# Patient Record
Sex: FEMALE | Race: WHITE | HISPANIC OR LATINO | Employment: FULL TIME | URBAN - METROPOLITAN AREA
[De-identification: names, ages, dates, MRNs, and addresses within clinical notes are randomized per-mention and may not be internally consistent; named-entity substitution may affect disease eponyms.]

---

## 2018-04-09 ENCOUNTER — APPOINTMENT (OUTPATIENT)
Dept: MEDICAL GROUP | Facility: MEDICAL CENTER | Age: 33
End: 2018-04-09
Payer: COMMERCIAL

## 2018-04-30 RX ORDER — CICLOPIROX 80 MG/ML
1 SOLUTION TOPICAL
COMMUNITY
Start: 2017-10-20 | End: 2020-09-10

## 2018-05-01 ENCOUNTER — OFFICE VISIT (OUTPATIENT)
Dept: MEDICAL GROUP | Facility: MEDICAL CENTER | Age: 33
End: 2018-05-01
Payer: COMMERCIAL

## 2018-05-01 VITALS
SYSTOLIC BLOOD PRESSURE: 112 MMHG | DIASTOLIC BLOOD PRESSURE: 64 MMHG | HEART RATE: 94 BPM | WEIGHT: 179.01 LBS | RESPIRATION RATE: 16 BRPM | BODY MASS INDEX: 32.94 KG/M2 | TEMPERATURE: 99.6 F | OXYGEN SATURATION: 96 % | HEIGHT: 62 IN

## 2018-05-01 DIAGNOSIS — E11.9 CONTROLLED TYPE 2 DIABETES MELLITUS WITHOUT COMPLICATION, WITHOUT LONG-TERM CURRENT USE OF INSULIN (HCC): ICD-10-CM

## 2018-05-01 DIAGNOSIS — E78.5 DYSLIPIDEMIA: ICD-10-CM

## 2018-05-01 DIAGNOSIS — Z00.00 HEALTH CARE MAINTENANCE: ICD-10-CM

## 2018-05-01 DIAGNOSIS — E28.2 PCOS (POLYCYSTIC OVARIAN SYNDROME): ICD-10-CM

## 2018-05-01 DIAGNOSIS — Z76.89 ENCOUNTER TO ESTABLISH CARE: ICD-10-CM

## 2018-05-01 DIAGNOSIS — E66.9 OBESITY (BMI 30.0-34.9): ICD-10-CM

## 2018-05-01 PROCEDURE — 99214 OFFICE O/P EST MOD 30 MIN: CPT | Performed by: INTERNAL MEDICINE

## 2018-05-01 ASSESSMENT — PATIENT HEALTH QUESTIONNAIRE - PHQ9: CLINICAL INTERPRETATION OF PHQ2 SCORE: 0

## 2018-05-01 NOTE — PROGRESS NOTES
CHIEF COMPLIANT:   Chief Complaint   Patient presents with   • Establish Care     diabetic       Stefania Renteria is a 32 y.o. female here for DM follow up    DIABETES MELLITUS TYPE 2  Onset/D29 y/o, in   Diabetes education:  Y    Medications:   · Metformin:  1000 mg BID  · ACE/ARB: N  · Statin: N  · ASA: N  Compliant with medications: yes  Checking feet daily/wear soft socks/shoes: advised    Diabetes ABCDE TARGETS  · A1c, last:  pending  · Fingersticks:  BID  · Mean BS:  160 most frequently  · Hypoglycemia:  No  · No symptoms of hypoglycemia: tremors, hunger, sometimes dizzy  · Blood Pressure, goal < 140/90: yes  · Cholesterol-Lipid Panel: pending  · Dysalbuminuria:  pending    Diet:  regular  Exercise:  Yes, 4 days a week  BMI: 32    DM complications:  · Peripheral neuropathy: No numbness or tingling sensation in the feet.  · Retinopathy:      Last eye exam: 2018. No evidence of retinopathy.    · Nephropathy:     Neg  · CVS:     No CAD symptoms (CP/pressure, exertional dyspnea, edema).    · GI:     No gastropathy sx (nausea/vomiting).    FH of DM:     Blood pressure  No meds  Diet / exercise / BMI: as above.   FH of HTN:     LIPID PROFILE / HYPERLIPIDEMIA  No meds.   Diet / exercise / BMI: as above.   FH:     OBESITY, Body mass index is 32.74 kg/m².  Onset: childhood  Diet:  regular  Exercise:  Yes, 4 days a week  No temperature intolerance. No change in hair/skin quality, BMs.   No HTN, buffalo hump, purple striae, flushing.  FH of obesity: mother, siblings    PCOS  D yrs ago  She has:  -  irregular periods  - fertility problems  - blood glucose control, DM  - obesity    - no facial hair    She was f/u by gyn    Reviewed PMH, PSH, FH, SH, ALL, IMM, MEDS.     Current medicines (including changes today)  Current Outpatient Prescriptions   Medication Sig Dispense Refill   • glucose blood (ONE TOUCH ULTRA TEST) strip by Does not apply route.     • VITAMIN D, CHOLECALCIFEROL, PO Take  by mouth.     •  ciclopirox (PENLAC) 8 % solution Apply 1 Application to affected area(s).     • metformin (GLUCOPHAGE) 1000 MG tablet Take  by mouth.     • metformin (GLUCOPHAGE) 1000 MG tablet Take 1,000 mg by mouth.     • glucose blood (ONE TOUCH ULTRA TEST) strip 1 Strip by Other route.     • ONETOUCH DELICA LANCETS FINE Misc by Does not apply route.     • ONETOUCH DELICA LANCETS FINE Misc 1 Each by Other route.     • Prenatal MV-Min-Fe Fum-FA-DHA (PRENATAL 1 PO) Take  by mouth.     • glucose blood (ONE TOUCH ULTRA TEST) strip by Does not apply route.     • metformin (GLUCOPHAGE) 1000 MG tablet Take  by mouth.     • ONETOUCH DELICA LANCETS FINE Misc by Does not apply route.       No current facility-administered medications for this visit.      Allergies: Patient has no allergy information on record.  She  has a past medical history of Diabetes (HCC); Obesity; and PCOS (polycystic ovarian syndrome).  She  has a past surgical history that includes cholecystectomy.  Social History   Substance Use Topics   • Smoking status: Never Smoker   • Smokeless tobacco: Never Used   • Alcohol use No      Comment: 1 per month     Social History     Social History Narrative   • No narrative on file     Family History   Problem Relation Age of Onset   • Hypertension Mother    • Diabetes Father    • Hyperlipidemia Father    • No Known Problems Sister    • Diabetes Brother      Family Status   Relation Status   • Mother Alive   • Father Alive   • Sister    • Brother      ROS   Constitutional: Negative for fever, chills and weight loss.   HEENT: Negative for blurred vision, sore throat, swollen glands. No hearing loss or vertigo.  Respiratory: Negative for cough, wheezing, shortness of breath.   CVS: Negative for chest pain, palpitations, irregular heart beats, exertional dyspnea.   GI: Negative for heartburn, abdominal pain, nausea, vomiting, change in BMs.   : Negative for dysuria, polyuria.   MS: Negative for myalgias, joint pain.   Neuro: no  "headaches, numbness, weakness, seizures.   Skin: no skin lesions, rash.  Endocrine: per HPI.     PHYSICAL EXAM   Blood pressure 112/64, pulse 94, temperature 37.6 °C (99.6 °F), resp. rate 16, height 1.575 m (5' 2\"), weight 81.2 kg (179 lb 0.2 oz), SpO2 96 %. Body mass index is 32.74 kg/m².  Alert, oriented in no acute distress.  Eye contact is good, speech goal directed, affect bright.  HEENT: EOMI, PERRL, conjunctiva non-injected, sclera non-icteric.  Nares patent with no significant congestion or drainage.  Normal pinnae, external auditory canals, TM pearly gray with normal light reflex bilaterally.  Oral mucous membranes pink and moist with no lesions.  Neck supple with no cervical lymphadenopathy, JVD, palpable thyroid nodules or carotid bruits.  Lungs: clear to auscultation bilaterally with good excursion.  CV: regular rate and rhythm, without murmur, rubs, thrills, or gallops.  Abdomen: soft, non-distended, non-tender with normal bowel sounds. No CVAT, No masses, or organomegaly.  Lower extremities: color normal, vascularity normal, no edema, temperature normal  Musculoskeletal: Normal gait. No obvious muscle weakness or wasting.  Psych: Normal mood and affect. Alert and oriented x3. Judgment and insight is normal.  Neuro:speech normal, mental status intact, cranial nerves 2-12 intact, gait, including heel, toe, and tandem walking normal, muscle tone normal, muscle strength normal, sensation to light touch and pinprick normal, reflexes normal and symmetric    LABS     Pending    ASSESMENT AND PLAN     1. Controlled type 2 diabetes mellitus without complication, without long-term current use of insulin (HCC)  Pending labs, continue current treatment.     Discussed about:    - importance of appropriate diet and exercise.   - hypoglycemic symptoms and precautions.    - long-term complications of uncontrolled DM, (increased risk of CAD, strokes, retinopathy, nephropathy, /can lead to kidney failure, dialysis/, " peripheral neuropathy, (can lead to amputation)   - Good DM control can prevent / delay many of these complications.    - Recommended appropriate foot care     - COMP METABOLIC PANEL; Future  - HEMOGLOBIN A1C; Future  - MICROALBUMIN CREAT RATIO URINE; Future    2. Dyslipidemia  Pending labs, otherwise lifestyle modification as above  - COMP METABOLIC PANEL; Future  - LIPID PROFILE; Future    3. Obesity (BMI 30.0-34.9)  - Patient identified as having weight management issue.  Appropriate orders and counseling given.    4. PCOS (polycystic ovarian syndrome)  Pending labs; recommendations as above    5. Encounter to establish care  Release form for old records: signed    6. Health care maintenance  Reviewed PMH, PSH, FH, SH, ALL, MEDS, HCM/IMM.   Advised healthy habits, diet, exercise.    All questions are answered.    Smoking Counseling: Nonsmoker    Followup: Return in about 4 weeks (around 5/29/2018) for Short.

## 2018-05-05 ENCOUNTER — HOSPITAL ENCOUNTER (OUTPATIENT)
Dept: LAB | Facility: MEDICAL CENTER | Age: 33
End: 2018-05-05
Attending: INTERNAL MEDICINE
Payer: COMMERCIAL

## 2018-05-05 DIAGNOSIS — E11.9 CONTROLLED TYPE 2 DIABETES MELLITUS WITHOUT COMPLICATION, WITHOUT LONG-TERM CURRENT USE OF INSULIN (HCC): ICD-10-CM

## 2018-05-05 DIAGNOSIS — E78.5 DYSLIPIDEMIA: ICD-10-CM

## 2018-05-05 LAB
ALBUMIN SERPL BCP-MCNC: 4 G/DL (ref 3.2–4.9)
ALBUMIN/GLOB SERPL: 1.1 G/DL
ALP SERPL-CCNC: 66 U/L (ref 30–99)
ALT SERPL-CCNC: 18 U/L (ref 2–50)
ANION GAP SERPL CALC-SCNC: 8 MMOL/L (ref 0–11.9)
AST SERPL-CCNC: 13 U/L (ref 12–45)
BILIRUB SERPL-MCNC: 0.6 MG/DL (ref 0.1–1.5)
BUN SERPL-MCNC: 15 MG/DL (ref 8–22)
CALCIUM SERPL-MCNC: 8.9 MG/DL (ref 8.5–10.5)
CHLORIDE SERPL-SCNC: 103 MMOL/L (ref 96–112)
CHOLEST SERPL-MCNC: 186 MG/DL (ref 100–199)
CO2 SERPL-SCNC: 23 MMOL/L (ref 20–33)
CREAT SERPL-MCNC: 0.42 MG/DL (ref 0.5–1.4)
CREAT UR-MCNC: 172.7 MG/DL
GLOBULIN SER CALC-MCNC: 3.8 G/DL (ref 1.9–3.5)
GLUCOSE SERPL-MCNC: 158 MG/DL (ref 65–99)
HDLC SERPL-MCNC: 42 MG/DL
LDLC SERPL CALC-MCNC: 103 MG/DL
MICROALBUMIN UR-MCNC: 11.6 MG/DL
MICROALBUMIN/CREAT UR: 67 MG/G (ref 0–30)
POTASSIUM SERPL-SCNC: 4.1 MMOL/L (ref 3.6–5.5)
PROT SERPL-MCNC: 7.8 G/DL (ref 6–8.2)
SODIUM SERPL-SCNC: 134 MMOL/L (ref 135–145)
TRIGL SERPL-MCNC: 205 MG/DL (ref 0–149)

## 2018-05-05 PROCEDURE — 80061 LIPID PANEL: CPT

## 2018-05-05 PROCEDURE — 83036 HEMOGLOBIN GLYCOSYLATED A1C: CPT

## 2018-05-05 PROCEDURE — 82043 UR ALBUMIN QUANTITATIVE: CPT

## 2018-05-05 PROCEDURE — 36415 COLL VENOUS BLD VENIPUNCTURE: CPT

## 2018-05-05 PROCEDURE — 82570 ASSAY OF URINE CREATININE: CPT

## 2018-05-05 PROCEDURE — 80053 COMPREHEN METABOLIC PANEL: CPT

## 2018-05-08 ENCOUNTER — OFFICE VISIT (OUTPATIENT)
Dept: MEDICAL GROUP | Facility: MEDICAL CENTER | Age: 33
End: 2018-05-08
Payer: COMMERCIAL

## 2018-05-08 VITALS
OXYGEN SATURATION: 97 % | RESPIRATION RATE: 16 BRPM | HEIGHT: 62 IN | HEART RATE: 96 BPM | DIASTOLIC BLOOD PRESSURE: 80 MMHG | TEMPERATURE: 99.6 F | SYSTOLIC BLOOD PRESSURE: 130 MMHG | BODY MASS INDEX: 32.09 KG/M2 | WEIGHT: 174.38 LBS

## 2018-05-08 DIAGNOSIS — E11.9 CONTROLLED TYPE 2 DIABETES MELLITUS WITHOUT COMPLICATION, WITHOUT LONG-TERM CURRENT USE OF INSULIN (HCC): ICD-10-CM

## 2018-05-08 DIAGNOSIS — E78.5 DYSLIPIDEMIA: ICD-10-CM

## 2018-05-08 DIAGNOSIS — E66.9 OBESITY (BMI 30.0-34.9): ICD-10-CM

## 2018-05-08 DIAGNOSIS — Z00.00 HEALTH CARE MAINTENANCE: ICD-10-CM

## 2018-05-08 DIAGNOSIS — R10.9 RIGHT LATERAL ABDOMINAL PAIN: ICD-10-CM

## 2018-05-08 LAB
HBA1C MFR BLD: 7.2 % (ref ?–5.8)
INT CON NEG: NEGATIVE
INT CON POS: POSITIVE

## 2018-05-08 PROCEDURE — 99214 OFFICE O/P EST MOD 30 MIN: CPT | Performed by: INTERNAL MEDICINE

## 2018-05-08 PROCEDURE — 83036 HEMOGLOBIN GLYCOSYLATED A1C: CPT | Performed by: INTERNAL MEDICINE

## 2018-05-08 NOTE — PROGRESS NOTES
CHIEF COMPLIANT:   Chief Complaint   Patient presents with   • Results     labs   • Medication Refill     diabetes medication   • Abdominal Pain     lower right side     Stefania Renteria is a 33 y.o. female here for DM follow up    DIABETES MELLITUS TYPE 2  Onset/D29 y/o, in   Diabetes education:  Y     Medications:   · Metformin:  1000 mg BID  · ACE/ARB: N  · Statin: N  · ASA: N  Compliant with medications: yes  Checking feet daily/wear soft socks/shoes: advised     Diabetes ABCDE TARGETS  · A1c, last:  7.2  · Fingersticks:  BID  · Mean BS:  in 150'  · Hypoglycemia:  No  ? No symptoms of hypoglycemia: tremors, hunger, sometimes dizzy  · Blood Pressure, goal < 140/90: yes  · Cholesterol-Lipid Panel: abn  · Dysalbuminuria:  pos     Diet:  regular  Exercise:  Yes, 4 days a week  BMI: 32     DM complications:  · Peripheral neuropathy:           No numbness or tingling sensation in the feet.  · Retinopathy:                            Last eye exam: 2018. No evidence of retinopathy.    · Nephropathy:                          Neg  · CVS:                                        No CAD symptoms (CP/pressure, exertional dyspnea, edema).    · GI:                                           No gastropathy sx (nausea/vomiting).     FH of DM:      Blood pressure  No meds  Diet / exercise / BMI: as above.   FH of HTN: mother     LIPID PROFILE / HYPERLIPIDEMIA  No meds.   Diet / exercise / BMI: as above.   FH: father     OBESITY, Body mass index is 32.74 kg/m².  Onset: childhood  Diet:  regular  Exercise:  Yes, 4 days a week  No temperature intolerance. No change in hair/skin quality, BMs.   No HTN, buffalo hump, purple striae, flushing.  FH of obesity: mother, siblings     PCOS  D yrs ago  She has:  -  irregular periods  - fertility problems  - blood glucose control, DM  - obesity     - no facial hair  On prenatal multivit planning pregnancy.  She was f/u by gyn    RUQ pain  Onset, 3 days ago  - Complains of dull pain that  has been  - Constant  - Mild to moderate  - Worse with inspiration  - Improved by shallow breathing    Denies:  -  Nausea, vomiting, heartburn  - Diarrhea, blood in the stool  - Radiation of the pain  - Change of the pain with food intake.    Reviewed PMH, PSH, FH, SH, ALL, IMM, MEDS.     Current medicines (including changes today)  Current Outpatient Prescriptions   Medication Sig Dispense Refill   • glucose blood (ONE TOUCH ULTRA TEST) strip by Does not apply route.     • VITAMIN D, CHOLECALCIFEROL, PO Take  by mouth.     • ciclopirox (PENLAC) 8 % solution Apply 1 Application to affected area(s).     • metformin (GLUCOPHAGE) 1000 MG tablet Take  by mouth.     • glucose blood (ONE TOUCH ULTRA TEST) strip 1 Strip by Other route.     • ONETOUCH DELICA LANCETS FINE Misc by Does not apply route.     • ONETOUCH DELICA LANCETS FINE Misc 1 Each by Other route.     • glucose blood (ONE TOUCH ULTRA TEST) strip by Does not apply route.     • metformin (GLUCOPHAGE) 1000 MG tablet Take  by mouth.     • ONETOUCH DELICA LANCETS FINE Misc by Does not apply route.       No current facility-administered medications for this visit.      Allergies: Patient has no allergy information on record.  She  has a past medical history of Diabetes (HCC); Obesity; and PCOS (polycystic ovarian syndrome).  She  has a past surgical history that includes cholecystectomy.  Social History   Substance Use Topics   • Smoking status: Never Smoker   • Smokeless tobacco: Never Used   • Alcohol use No      Comment: 1 per month     Social History     Social History Narrative   • No narrative on file     Family History   Problem Relation Age of Onset   • Hypertension Mother    • Diabetes Father    • Hyperlipidemia Father    • No Known Problems Sister    • Diabetes Brother      Family Status   Relation Status   • Mother Alive   • Father Alive   • Sister    • Brother        ROS   Constitutional: Negative for fever, chills and weight loss.   HEENT: Negative for  "blurred vision, sore throat, swollen glands. No hearing loss or vertigo.  Respiratory: Negative for cough, wheezing, shortness of breath.   CVS: Negative for chest pain, palpitations, irregular heart beats, exertional dyspnea.   GI: as above.  : Negative for dysuria, polyuria.   MS: Negative for myalgias, joint pain.   Neuro: no headaches, numbness, weakness, seizures.   Skin: no skin lesions, rash.  Endocrine: per HPI.     PHYSICAL EXAM   Blood pressure 130/80, pulse 96, temperature 37.6 °C (99.6 °F), resp. rate 16, height 1.575 m (5' 2\"), weight 79.1 kg (174 lb 6.1 oz), SpO2 97 %. Body mass index is 31.9 kg/m².  Alert, oriented in no acute distress.  Eye contact is good, speech goal directed, affect bright.  HEENT: EOMI, PERRL, conjunctiva non-injected, sclera non-icteric.  Nares patent with no significant congestion or drainage.  Normal pinnae, external auditory canals, TM pearly gray with normal light reflex bilaterally.  Oral mucous membranes pink and moist with no lesions.  Neck supple with no cervical lymphadenopathy, JVD, palpable thyroid nodules or carotid bruits.  Lungs: clear to auscultation bilaterally with good excursion.  CV: regular rate and rhythm, without murmur, rubs, thrills, or gallops.  Abdomen: soft, non-distended, with normal bowel sounds.   TTP in RUQ, RLQ, LLQ. Menjivar was negative.  No CVAT, No masses, or organomegaly.  Lower extremities: color normal, vascularity normal, no edema, temperature normal  Musculoskeletal: Normal gait. No obvious muscle weakness or wasting.  Psych: Normal mood and affect. Alert and oriented x3. Judgment and insight is normal.  Neuro:speech normal, mental status intact, cranial nerves 2-12 intact, gait, including heel, toe, and tandem walking normal, muscle tone normal, muscle strength normal, sensation to light touch and pinprick normal, reflexes normal and symmetric  DM foot exam: intact to pin prick, bilaterally.  Dorsalis pedis pulse is +4/4 bilaterally. No " redness, ulcers, calluses.     LABS     Results reviewed and discussed with the patient, questions answered.    Last labs:  Lab Results   Component Value Date/Time    CHOLSTRLTOT 186 05/05/2018 08:52 AM     (H) 05/05/2018 08:52 AM    HDL 42 05/05/2018 08:52 AM    TRIGLYCERIDE 205 (H) 05/05/2018 08:52 AM       Lab Results   Component Value Date/Time    SODIUM 134 (L) 05/05/2018 08:52 AM    POTASSIUM 4.1 05/05/2018 08:52 AM    CHLORIDE 103 05/05/2018 08:52 AM    CO2 23 05/05/2018 08:52 AM    GLUCOSE 158 (H) 05/05/2018 08:52 AM    BUN 15 05/05/2018 08:52 AM    CREATININE 0.42 (L) 05/05/2018 08:52 AM     Lab Results   Component Value Date/Time    ALKPHOSPHAT 66 05/05/2018 08:52 AM    ASTSGOT 13 05/05/2018 08:52 AM    ALTSGPT 18 05/05/2018 08:52 AM    TBILIRUBIN 0.6 05/05/2018 08:52 AM      ASSESMENT AND PLAN     1. Controlled type 2 diabetes mellitus without complication, without long-term current use of insulin (HCC)  A1c was 7.2.   Continue current treatment regimen.  - POCT  A1C  - HEMOGLOBIN A1C; Future  - COMP METABOLIC PANEL; Future  - MICROALBUMIN CREAT RATIO URINE; Future  - metformin (GLUCOPHAGE) 1000 MG tablet; Take 1 Tab by mouth 2 times a day.  Dispense: 180 Tab; Refill: 1    Discussed about:    - importance of appropriate diet and exercise.   - hypoglycemic symptoms and precautions.    - long-term complications of uncontrolled DM, (increased risk of CAD, strokes, retinopathy, nephropathy, /can lead to kidney failure, dialysis/, peripheral neuropathy, (can lead to amputation)   - Good DM control can prevent / delay many of these complications.    - Recommended appropriate foot care     · No ASA, ACE given, planning pregnancy.     2. Dyslipidemia  Slightly abnormal lipid panel  - No medications, as she is planning pregnancy  - LIPID PROFILE; Future    3. Obesity (BMI 30.0-34.9)  Appropriate counseling given    4. Right lateral abdominal pain  Advised to go to the ER if worsening pain.  - US-ABDOMEN  COMPLETE SURVEY; Future    5. Health care maintenance  She is followed by facility clinic for Pap smear.   She has ophthalmologist for eye exam.    All questions are answered.    Health Maintenance: Completed    All questions are answered.    Smoking Counseling: Nonsmoker    Followup: Return in about 3 months (around 8/8/2018) for Short.

## 2018-05-13 ENCOUNTER — APPOINTMENT (OUTPATIENT)
Dept: RADIOLOGY | Facility: MEDICAL CENTER | Age: 33
End: 2018-05-13
Attending: EMERGENCY MEDICINE
Payer: COMMERCIAL

## 2018-05-13 ENCOUNTER — HOSPITAL ENCOUNTER (EMERGENCY)
Facility: MEDICAL CENTER | Age: 33
End: 2018-05-13
Attending: EMERGENCY MEDICINE
Payer: COMMERCIAL

## 2018-05-13 VITALS
RESPIRATION RATE: 18 BRPM | OXYGEN SATURATION: 94 % | BODY MASS INDEX: 32.05 KG/M2 | HEART RATE: 96 BPM | TEMPERATURE: 97.7 F | HEIGHT: 62 IN | WEIGHT: 174.16 LBS | SYSTOLIC BLOOD PRESSURE: 137 MMHG | DIASTOLIC BLOOD PRESSURE: 83 MMHG

## 2018-05-13 DIAGNOSIS — R10.9 ABDOMINAL PAIN, UNSPECIFIED ABDOMINAL LOCATION: ICD-10-CM

## 2018-05-13 LAB
ALBUMIN SERPL BCP-MCNC: 4 G/DL (ref 3.2–4.9)
ALBUMIN/GLOB SERPL: 1 G/DL
ALP SERPL-CCNC: 85 U/L (ref 30–99)
ALT SERPL-CCNC: 29 U/L (ref 2–50)
ANION GAP SERPL CALC-SCNC: 8 MMOL/L (ref 0–11.9)
APPEARANCE UR: CLEAR
AST SERPL-CCNC: 23 U/L (ref 12–45)
BACTERIA #/AREA URNS HPF: ABNORMAL /HPF
BASOPHILS # BLD AUTO: 0.4 % (ref 0–1.8)
BASOPHILS # BLD: 0.04 K/UL (ref 0–0.12)
BILIRUB SERPL-MCNC: 0.5 MG/DL (ref 0.1–1.5)
BILIRUB UR QL STRIP.AUTO: NEGATIVE
BUN SERPL-MCNC: 13 MG/DL (ref 8–22)
CALCIUM SERPL-MCNC: 9.1 MG/DL (ref 8.4–10.2)
CHLORIDE SERPL-SCNC: 103 MMOL/L (ref 96–112)
CO2 SERPL-SCNC: 21 MMOL/L (ref 20–33)
COLOR UR: YELLOW
CREAT SERPL-MCNC: 0.59 MG/DL (ref 0.5–1.4)
EOSINOPHIL # BLD AUTO: 0.25 K/UL (ref 0–0.51)
EOSINOPHIL NFR BLD: 2.2 % (ref 0–6.9)
EPI CELLS #/AREA URNS HPF: ABNORMAL /HPF
ERYTHROCYTE [DISTWIDTH] IN BLOOD BY AUTOMATED COUNT: 41.4 FL (ref 35.9–50)
GLOBULIN SER CALC-MCNC: 4.1 G/DL (ref 1.9–3.5)
GLUCOSE SERPL-MCNC: 174 MG/DL (ref 65–99)
GLUCOSE UR STRIP.AUTO-MCNC: NEGATIVE MG/DL
HCG SERPL QL: NEGATIVE
HCT VFR BLD AUTO: 39.3 % (ref 37–47)
HGB BLD-MCNC: 13.1 G/DL (ref 12–16)
IMM GRANULOCYTES # BLD AUTO: 0.11 K/UL (ref 0–0.11)
IMM GRANULOCYTES NFR BLD AUTO: 1 % (ref 0–0.9)
KETONES UR STRIP.AUTO-MCNC: ABNORMAL MG/DL
LEUKOCYTE ESTERASE UR QL STRIP.AUTO: NEGATIVE
LIPASE SERPL-CCNC: 23 U/L (ref 7–58)
LYMPHOCYTES # BLD AUTO: 3.59 K/UL (ref 1–4.8)
LYMPHOCYTES NFR BLD: 32.1 % (ref 22–41)
MCH RBC QN AUTO: 27.5 PG (ref 27–33)
MCHC RBC AUTO-ENTMCNC: 33.3 G/DL (ref 33.6–35)
MCV RBC AUTO: 82.4 FL (ref 81.4–97.8)
MICRO URNS: ABNORMAL
MONOCYTES # BLD AUTO: 0.71 K/UL (ref 0–0.85)
MONOCYTES NFR BLD AUTO: 6.4 % (ref 0–13.4)
MUCOUS THREADS #/AREA URNS HPF: ABNORMAL /HPF
NEUTROPHILS # BLD AUTO: 6.48 K/UL (ref 2–7.15)
NEUTROPHILS NFR BLD: 57.9 % (ref 44–72)
NITRITE UR QL STRIP.AUTO: NEGATIVE
NRBC # BLD AUTO: 0 K/UL
NRBC BLD-RTO: 0 /100 WBC
PH UR STRIP.AUTO: 5.5 [PH]
PLATELET # BLD AUTO: 378 K/UL (ref 164–446)
PMV BLD AUTO: 9.8 FL (ref 9–12.9)
POTASSIUM SERPL-SCNC: 3.7 MMOL/L (ref 3.6–5.5)
PROT SERPL-MCNC: 8.1 G/DL (ref 6–8.2)
PROT UR QL STRIP: 30 MG/DL
RBC # BLD AUTO: 4.77 M/UL (ref 4.2–5.4)
RBC # URNS HPF: ABNORMAL /HPF
RBC UR QL AUTO: ABNORMAL
SODIUM SERPL-SCNC: 132 MMOL/L (ref 135–145)
SP GR UR REFRACTOMETRY: 1.02
WBC # BLD AUTO: 11.2 K/UL (ref 4.8–10.8)
WBC #/AREA URNS HPF: ABNORMAL /HPF

## 2018-05-13 PROCEDURE — 83690 ASSAY OF LIPASE: CPT

## 2018-05-13 PROCEDURE — 84703 CHORIONIC GONADOTROPIN ASSAY: CPT

## 2018-05-13 PROCEDURE — 96375 TX/PRO/DX INJ NEW DRUG ADDON: CPT

## 2018-05-13 PROCEDURE — 700117 HCHG RX CONTRAST REV CODE 255: Performed by: EMERGENCY MEDICINE

## 2018-05-13 PROCEDURE — 80053 COMPREHEN METABOLIC PANEL: CPT

## 2018-05-13 PROCEDURE — 96374 THER/PROPH/DIAG INJ IV PUSH: CPT

## 2018-05-13 PROCEDURE — A9270 NON-COVERED ITEM OR SERVICE: HCPCS | Performed by: EMERGENCY MEDICINE

## 2018-05-13 PROCEDURE — 85025 COMPLETE CBC W/AUTO DIFF WBC: CPT

## 2018-05-13 PROCEDURE — 700111 HCHG RX REV CODE 636 W/ 250 OVERRIDE (IP): Performed by: EMERGENCY MEDICINE

## 2018-05-13 PROCEDURE — 700102 HCHG RX REV CODE 250 W/ 637 OVERRIDE(OP): Performed by: EMERGENCY MEDICINE

## 2018-05-13 PROCEDURE — 74177 CT ABD & PELVIS W/CONTRAST: CPT

## 2018-05-13 PROCEDURE — 36415 COLL VENOUS BLD VENIPUNCTURE: CPT

## 2018-05-13 PROCEDURE — 99285 EMERGENCY DEPT VISIT HI MDM: CPT

## 2018-05-13 PROCEDURE — 81001 URINALYSIS AUTO W/SCOPE: CPT

## 2018-05-13 RX ORDER — MORPHINE SULFATE 4 MG/ML
4 INJECTION, SOLUTION INTRAMUSCULAR; INTRAVENOUS ONCE
Status: COMPLETED | OUTPATIENT
Start: 2018-05-13 | End: 2018-05-13

## 2018-05-13 RX ORDER — ONDANSETRON 2 MG/ML
4 INJECTION INTRAMUSCULAR; INTRAVENOUS ONCE
Status: COMPLETED | OUTPATIENT
Start: 2018-05-13 | End: 2018-05-13

## 2018-05-13 RX ORDER — LEVOFLOXACIN 750 MG/1
750 TABLET, FILM COATED ORAL DAILY
Qty: 10 TAB | Refills: 0 | Status: SHIPPED | OUTPATIENT
Start: 2018-05-13 | End: 2018-11-12

## 2018-05-13 RX ORDER — METRONIDAZOLE 500 MG/1
500 TABLET ORAL 3 TIMES DAILY
Qty: 30 TAB | Refills: 0 | Status: SHIPPED | OUTPATIENT
Start: 2018-05-13 | End: 2018-05-23

## 2018-05-13 RX ORDER — METRONIDAZOLE 500 MG/1
500 TABLET ORAL ONCE
Status: COMPLETED | OUTPATIENT
Start: 2018-05-13 | End: 2018-05-13

## 2018-05-13 RX ORDER — LEVOFLOXACIN 500 MG/1
750 TABLET, FILM COATED ORAL EVERY 24 HOURS
Status: DISCONTINUED | OUTPATIENT
Start: 2018-05-14 | End: 2018-05-14 | Stop reason: HOSPADM

## 2018-05-13 RX ORDER — LEVOFLOXACIN 500 MG/1
TABLET, FILM COATED ORAL
Status: DISPENSED
Start: 2018-05-13 | End: 2018-05-14

## 2018-05-13 RX ADMIN — LEVOFLOXACIN 750 MG: 500 TABLET, FILM COATED ORAL at 22:40

## 2018-05-13 RX ADMIN — IOHEXOL 100 ML: 350 INJECTION, SOLUTION INTRAVENOUS at 22:00

## 2018-05-13 RX ADMIN — METRONIDAZOLE 500 MG: 500 TABLET ORAL at 22:40

## 2018-05-13 RX ADMIN — MORPHINE SULFATE 4 MG: 4 INJECTION INTRAVENOUS at 19:28

## 2018-05-13 RX ADMIN — ONDANSETRON 4 MG: 2 INJECTION INTRAMUSCULAR; INTRAVENOUS at 19:28

## 2018-05-13 ASSESSMENT — PAIN SCALES - GENERAL: PAINLEVEL_OUTOF10: 4

## 2018-05-14 NOTE — DISCHARGE INSTRUCTIONS
Diverticulitis  Diverticulitis is inflammation or infection of small pouches in your colon that form when you have a condition called diverticulosis. The pouches in your colon are called diverticula. Your colon, or large intestine, is where water is absorbed and stool is formed.  Complications of diverticulitis can include:  · Bleeding.  · Severe infection.  · Severe pain.  · Perforation of your colon.  · Obstruction of your colon.  What are the causes?  Diverticulitis is caused by bacteria.  Diverticulitis happens when stool becomes trapped in diverticula. This allows bacteria to grow in the diverticula, which can lead to inflammation and infection.  What increases the risk?  People with diverticulosis are at risk for diverticulitis. Eating a diet that does not include enough fiber from fruits and vegetables may make diverticulitis more likely to develop.  What are the signs or symptoms?  Symptoms of diverticulitis may include:  · Abdominal pain and tenderness. The pain is normally located on the left side of the abdomen, but may occur in other areas.  · Fever and chills.  · Bloating.  · Cramping.  · Nausea.  · Vomiting.  · Constipation.  · Diarrhea.  · Blood in your stool.  How is this diagnosed?  Your health care provider will ask you about your medical history and do a physical exam. You may need to have tests done because many medical conditions can cause the same symptoms as diverticulitis. Tests may include:  · Blood tests.  · Urine tests.  · Imaging tests of the abdomen, including X-rays and CT scans.  When your condition is under control, your health care provider may recommend that you have a colonoscopy. A colonoscopy can show how severe your diverticula are and whether something else is causing your symptoms.  How is this treated?  Most cases of diverticulitis are mild and can be treated at home. Treatment may include:  · Taking over-the-counter pain medicines.  · Following a clear liquid diet.  · Taking  antibiotic medicines by mouth for 7-10 days.  More severe cases may be treated at a hospital. Treatment may include:  · Not eating or drinking.  · Taking prescription pain medicine.  · Receiving antibiotic medicines through an IV tube.  · Receiving fluids and nutrition through an IV tube.  · Surgery.  Follow these instructions at home:  · Follow your health care provider’s instructions carefully.  · Follow a full liquid diet or other diet as directed by your health care provider. After your symptoms improve, your health care provider may tell you to change your diet. He or she may recommend you eat a high-fiber diet. Fruits and vegetables are good sources of fiber. Fiber makes it easier to pass stool.  · Take fiber supplements or probiotics as directed by your health care provider.  · Only take medicines as directed by your health care provider.  · Keep all your follow-up appointments.  Contact a health care provider if:  · Your pain does not improve.  · You have a hard time eating food.  · Your bowel movements do not return to normal.  Get help right away if:  · Your pain becomes worse.  · Your symptoms do not get better.  · Your symptoms suddenly get worse.  · You have a fever.  · You have repeated vomiting.  · You have bloody or black, tarry stools.  This information is not intended to replace advice given to you by your health care provider. Make sure you discuss any questions you have with your health care provider.  Document Released: 09/27/2006 Document Revised: 05/25/2017 Document Reviewed: 11/12/2014  [employer] Interactive Patient Education © 2017 [employer]

## 2018-05-14 NOTE — ED NOTES
Pt is accompanied by family.  She C/O acute onset of diffuse abdominal pain since last night with episodic N/V since earlier today.

## 2018-05-14 NOTE — ED PROVIDER NOTES
ED Provider Note    CHIEF COMPLAINT  Chief Complaint   Patient presents with   • Abdominal Pain       HPI  Stefania Renteria is a 33 y.o. female who presents with a complaint of abdominal pain. The patient has been having pain since last night that is intermittent in nature and localized to the lower abdomen and characterized as cramping, no radiation to the back. Denies any history of ureterolithiasis. She denies any hematuria. Currently is on her menstrual period. She does not believe she is pregnant. She denies fever chills nothing specifically makes pain worse or better. She has a history of diabetes and polycystic ovarian syndrome. She has a history of cholecystectomy. She denies any vaginal discharge    REVIEW OF SYSTEMS  See HPI for further details. All Other systems are reviewed and negative except as noted above    PAST MEDICAL HISTORY  Past Medical History:   Diagnosis Date   • Diabetes (HCC)    • Obesity    • PCOS (polycystic ovarian syndrome)        FAMILY HISTORY  Family History   Problem Relation Age of Onset   • Hypertension Mother    • Diabetes Father    • Hyperlipidemia Father    • No Known Problems Sister    • Diabetes Brother        SOCIAL HISTORY  Social History     Social History   • Marital status:      Spouse name: N/A   • Number of children: N/A   • Years of education: N/A     Social History Main Topics   • Smoking status: Never Smoker   • Smokeless tobacco: Never Used   • Alcohol use No      Comment: Rarely   • Drug use: No   • Sexual activity: Yes     Other Topics Concern   • Not on file     Social History Narrative   • No narrative on file       SURGICAL HISTORY  Past Surgical History:   Procedure Laterality Date   • CHOLECYSTECTOMY         CURRENT MEDICATIONS  Home Medications     Reviewed by Alex Nava R.N. (Registered Nurse) on 05/13/18 at 8427  Med List Status: Partial   Medication Last Dose Status   ciclopirox (PENLAC) 8 % solution Not taking Active   glucose blood (ONE TOUCH  "ULTRA TEST) strip 5/12/2018 Active   glucose blood (ONE TOUCH ULTRA TEST) strip 5/13/2018 Active   glucose blood (ONE TOUCH ULTRA TEST) strip 5/13/2018 Active   metformin (GLUCOPHAGE) 1000 MG tablet 5/13/2018 Active   ONETOUCH DELICA LANCETS FINE Misc 5/13/2018 Active   ONETOUCH DELICA LANCETS FINE Misc 5/13/2018 Active   ONETOUCH DELICA LANCETS FINE Misc 5/13/2018 Active   VITAMIN D, CHOLECALCIFEROL, PO 5/12/2018 Active                 ALLERGIES  No Known Allergies    PHYSICAL EXAM  VITAL SIGNS: /83   Pulse 90   Temp 36.5 °C (97.7 °F)   Resp 18   Ht 1.575 m (5' 2\") Comment: Stated  Wt 79 kg (174 lb 2.6 oz)   LMP 05/10/2018   SpO2 100%   BMI 31.85 kg/m²   Constitutional :  Well developed, Well nourished, No acute distress, Non-toxic appearance.   HENT: Head is atraumatic normocephalic moist mucous membranes  Eyes: Normal-appearing nonicteric  Neck: Normal range of motion, No tenderness, Supple, No stridor.   Lymphatic: No cervical adenopathy.   Cardiovascular: Normal heart rate, Normal rhythm, No murmurs, No rubs, No gallops.   Thorax & Lungs: Equal breath sounds bilaterally no rales rhonchi  Skin: Warm, Dry, No erythema, No rash.   Abdomen is soft there is no tenderness rebound guarding in any quadrant of the abdomen  Extremities is no cyanosis or edema  Psychiatric appropriate mood and affect    CT-ABDOMEN-PELVIS WITH   Final Result         1. Stranding in the posterior cecum adjacent to a small diverticulum could relate to cecal diverticulitis. No extraluminal gas. No fluid collection.      2. Normal appendix.        Results for orders placed or performed during the hospital encounter of 05/13/18   CBC WITH DIFFERENTIAL   Result Value Ref Range    WBC 11.2 (H) 4.8 - 10.8 K/uL    RBC 4.77 4.20 - 5.40 M/uL    Hemoglobin 13.1 12.0 - 16.0 g/dL    Hematocrit 39.3 37.0 - 47.0 %    MCV 82.4 81.4 - 97.8 fL    MCH 27.5 27.0 - 33.0 pg    MCHC 33.3 (L) 33.6 - 35.0 g/dL    RDW 41.4 35.9 - 50.0 fL    Platelet " Count 378 164 - 446 K/uL    MPV 9.8 9.0 - 12.9 fL    Neutrophils-Polys 57.90 44.00 - 72.00 %    Lymphocytes 32.10 22.00 - 41.00 %    Monocytes 6.40 0.00 - 13.40 %    Eosinophils 2.20 0.00 - 6.90 %    Basophils 0.40 0.00 - 1.80 %    Immature Granulocytes 1.00 (H) 0.00 - 0.90 %    Nucleated RBC 0.00 /100 WBC    Neutrophils (Absolute) 6.48 2.00 - 7.15 K/uL    Lymphs (Absolute) 3.59 1.00 - 4.80 K/uL    Monos (Absolute) 0.71 0.00 - 0.85 K/uL    Eos (Absolute) 0.25 0.00 - 0.51 K/uL    Baso (Absolute) 0.04 0.00 - 0.12 K/uL    Immature Granulocytes (abs) 0.11 0.00 - 0.11 K/uL    NRBC (Absolute) 0.00 K/uL   COMP METABOLIC PANEL   Result Value Ref Range    Sodium 132 (L) 135 - 145 mmol/L    Potassium 3.7 3.6 - 5.5 mmol/L    Chloride 103 96 - 112 mmol/L    Co2 21 20 - 33 mmol/L    Anion Gap 8.0 0.0 - 11.9    Glucose 174 (H) 65 - 99 mg/dL    Bun 13 8 - 22 mg/dL    Creatinine 0.59 0.50 - 1.40 mg/dL    Calcium 9.1 8.4 - 10.2 mg/dL    AST(SGOT) 23 12 - 45 U/L    ALT(SGPT) 29 2 - 50 U/L    Alkaline Phosphatase 85 30 - 99 U/L    Total Bilirubin 0.5 0.1 - 1.5 mg/dL    Albumin 4.0 3.2 - 4.9 g/dL    Total Protein 8.1 6.0 - 8.2 g/dL    Globulin 4.1 (H) 1.9 - 3.5 g/dL    A-G Ratio 1.0 g/dL   LIPASE   Result Value Ref Range    Lipase 23 7 - 58 U/L   HCG QUAL SERUM   Result Value Ref Range    Beta-Hcg Qualitative Serum Negative Negative   URINALYSIS   Result Value Ref Range    Micro Urine Req Microscopic     Color Yellow     Character Clear     Ph 5.5 5.0 - 8.0    Glucose Negative Negative mg/dL    Ketones Trace (A) Negative mg/dL    Protein 30 (A) Negative mg/dL    Bilirubin Negative Negative    Nitrite Negative Negative    Leukocyte Esterase Negative Negative    Occult Blood Large (A) Negative   ESTIMATED GFR   Result Value Ref Range    GFR If African American >60 >60 mL/min/1.73 m 2    GFR If Non African American >60 >60 mL/min/1.73 m 2   REFRACTOMETER SG   Result Value Ref Range    Specific Gravity 1.025    URINE MICROSCOPIC (W/UA)    Result Value Ref Range    WBC 0-2 /hpf    RBC  (A) /hpf    Bacteria Few (A) None /hpf    Epithelial Cells Few Few /hpf    Mucous Threads Few /hpf     COURSE & MEDICAL DECISION MAKING  Pertinent Labs & Imaging studies reviewed. (See chart for details)  The patient is presenting with abdominal pain. Her pain has resolved significantly since her arrival in the emergency department workup included white blood count CMP which shows no significant abnormalities. Patient underwent CT imaging which showed no evidence of obstruction, appendicitis, there are findings of possible diverticulitis. This could account for her discomfort. She'll be treated with metronidazole and Levaquin for possible diverticulitis. 1st doses are administered in the emergency department. She was given 1 dose of morphine and Zofran in the emergency department for initial pain management which has been very effective. She is discharged stable condition to return the emergency Department for increasing pain fever vomiting.    FINAL IMPRESSION  1. Abdominal pain resolved  2. Diverticulitis  3.      Electronically signed by: Teja Rios, 5/13/2018

## 2018-05-16 ENCOUNTER — APPOINTMENT (OUTPATIENT)
Dept: RADIOLOGY | Facility: MEDICAL CENTER | Age: 33
End: 2018-05-16
Attending: INTERNAL MEDICINE
Payer: COMMERCIAL

## 2018-10-01 ENCOUNTER — APPOINTMENT (OUTPATIENT)
Dept: MEDICAL GROUP | Facility: MEDICAL CENTER | Age: 33
End: 2018-10-01
Payer: COMMERCIAL

## 2018-10-27 ENCOUNTER — HOSPITAL ENCOUNTER (OUTPATIENT)
Dept: LAB | Facility: MEDICAL CENTER | Age: 33
End: 2018-10-27
Attending: INTERNAL MEDICINE
Payer: COMMERCIAL

## 2018-10-27 DIAGNOSIS — E78.5 DYSLIPIDEMIA: ICD-10-CM

## 2018-10-27 DIAGNOSIS — E11.9 CONTROLLED TYPE 2 DIABETES MELLITUS WITHOUT COMPLICATION, WITHOUT LONG-TERM CURRENT USE OF INSULIN (HCC): ICD-10-CM

## 2018-10-27 LAB
ALBUMIN SERPL BCP-MCNC: 4.3 G/DL (ref 3.2–4.9)
ALBUMIN/GLOB SERPL: 1.1 G/DL
ALP SERPL-CCNC: 79 U/L (ref 30–99)
ALT SERPL-CCNC: 16 U/L (ref 2–50)
ANION GAP SERPL CALC-SCNC: 8 MMOL/L (ref 0–11.9)
AST SERPL-CCNC: 12 U/L (ref 12–45)
BILIRUB SERPL-MCNC: 0.8 MG/DL (ref 0.1–1.5)
BUN SERPL-MCNC: 16 MG/DL (ref 8–22)
CALCIUM SERPL-MCNC: 9.6 MG/DL (ref 8.5–10.5)
CHLORIDE SERPL-SCNC: 106 MMOL/L (ref 96–112)
CHOLEST SERPL-MCNC: 159 MG/DL (ref 100–199)
CO2 SERPL-SCNC: 23 MMOL/L (ref 20–33)
CREAT SERPL-MCNC: 0.66 MG/DL (ref 0.5–1.4)
CREAT UR-MCNC: 116.9 MG/DL
EST. AVERAGE GLUCOSE BLD GHB EST-MCNC: 157 MG/DL
FASTING STATUS PATIENT QL REPORTED: NORMAL
GLOBULIN SER CALC-MCNC: 3.8 G/DL (ref 1.9–3.5)
GLUCOSE SERPL-MCNC: 155 MG/DL (ref 65–99)
HBA1C MFR BLD: 7.1 % (ref 0–5.6)
HDLC SERPL-MCNC: 45 MG/DL
LDLC SERPL CALC-MCNC: 84 MG/DL
MICROALBUMIN UR-MCNC: 3 MG/DL
MICROALBUMIN/CREAT UR: 26 MG/G (ref 0–30)
POTASSIUM SERPL-SCNC: 4.1 MMOL/L (ref 3.6–5.5)
PROT SERPL-MCNC: 8.1 G/DL (ref 6–8.2)
SODIUM SERPL-SCNC: 137 MMOL/L (ref 135–145)
TRIGL SERPL-MCNC: 148 MG/DL (ref 0–149)

## 2018-10-27 PROCEDURE — 36415 COLL VENOUS BLD VENIPUNCTURE: CPT

## 2018-10-27 PROCEDURE — 80061 LIPID PANEL: CPT

## 2018-10-27 PROCEDURE — 80053 COMPREHEN METABOLIC PANEL: CPT

## 2018-10-27 PROCEDURE — 82043 UR ALBUMIN QUANTITATIVE: CPT

## 2018-10-27 PROCEDURE — 82570 ASSAY OF URINE CREATININE: CPT

## 2018-10-27 PROCEDURE — 83036 HEMOGLOBIN GLYCOSYLATED A1C: CPT

## 2018-11-05 DIAGNOSIS — E11.9 CONTROLLED TYPE 2 DIABETES MELLITUS WITHOUT COMPLICATION, WITHOUT LONG-TERM CURRENT USE OF INSULIN (HCC): ICD-10-CM

## 2018-11-05 NOTE — TELEPHONE ENCOUNTER
Was the patient seen in the last year in this department? Yes    Does patient have an active prescription for medications requested? No     Received Request Via: Pharmacy

## 2018-11-12 ENCOUNTER — OFFICE VISIT (OUTPATIENT)
Dept: MEDICAL GROUP | Facility: MEDICAL CENTER | Age: 33
End: 2018-11-12
Payer: COMMERCIAL

## 2018-11-12 VITALS
HEIGHT: 62 IN | SYSTOLIC BLOOD PRESSURE: 112 MMHG | TEMPERATURE: 99 F | WEIGHT: 162.7 LBS | DIASTOLIC BLOOD PRESSURE: 76 MMHG | OXYGEN SATURATION: 100 % | RESPIRATION RATE: 14 BRPM | HEART RATE: 84 BPM | BODY MASS INDEX: 29.94 KG/M2

## 2018-11-12 DIAGNOSIS — Z00.00 HEALTH CARE MAINTENANCE: ICD-10-CM

## 2018-11-12 DIAGNOSIS — E78.5 DYSLIPIDEMIA: ICD-10-CM

## 2018-11-12 DIAGNOSIS — B35.1 TOENAIL FUNGUS: ICD-10-CM

## 2018-11-12 DIAGNOSIS — E28.2 PCOS (POLYCYSTIC OVARIAN SYNDROME): ICD-10-CM

## 2018-11-12 DIAGNOSIS — E11.9 CONTROLLED TYPE 2 DIABETES MELLITUS WITHOUT COMPLICATION, WITHOUT LONG-TERM CURRENT USE OF INSULIN (HCC): ICD-10-CM

## 2018-11-12 PROCEDURE — 99214 OFFICE O/P EST MOD 30 MIN: CPT | Performed by: INTERNAL MEDICINE

## 2018-11-13 NOTE — PROGRESS NOTES
CHIEF COMPLIANT:   Chief Complaint   Patient presents with   • Follow-Up     Labs     Stefania Renteria is a 33 y.o. female here for DM follow up    DIABETES MELLITUS TYPE 2  Interval course  -No significant change, A1c 7.1, was 7.2  - lost WT significantly    Onset/D29 y/o, in   Diabetes education:  Y     Medications:   • Metformin:  1000 mg BID, did not take regularly  • ACE/ARB: N  • Statin: N  • ASA: N  Compliant with medications: yes  Checking feet daily/wear soft socks/shoes: advised     Diabetes ABCDE TARGETS  • A1c, last:  7.1, was 7.2  • Fingersticks:  daily  • Mean BS: 70 - 150  • Hypoglycemia:  No  o No symptoms of hypoglycemia: tremors, hunger, sometimes dizzy  • Blood Pressure, goal < 140/90: yes  • Cholesterol-Lipid Panel: abn  • Dysalbuminuria:  neg     Diet:  regular  Exercise:  Yes, 4 days a week  BMI: 29, was 32     DM complications:  • Peripheral neuropathy:           No numbness or tingling sensation in the feet.  • Retinopathy:                            Last eye exam: 2018. No evidence of retinopathy.    • Nephropathy:                          Neg  • CVS:                                        No CAD symptoms (CP/pressure, exertional dyspnea, edema).    • GI:                                           No gastropathy sx (nausea/vomiting).     FH of DM:      Blood pressure  No meds  Diet / exercise / BMI: as above.   FH of HTN: mother     Lipid profile  The patient had a slightly abnormal lipid panel, normalized without medications.  Diet / exercise / BMI: as above.   FH: father    PCOS  D yrs ago  She has:  -  irregular periods  - fertility problems  - blood glucose control, DM  - obesity     - no facial hair  On prenatal multivit planning pregnancy.  She was f/u by gyn    Reviewed PMH, PSH, FH, SH, ALL, IMM, MEDS.     Current medicines (including changes today)  Current Outpatient Prescriptions   Medication Sig Dispense Refill   • metformin (GLUCOPHAGE) 1000 MG tablet TAKE 1 TABLET BY  MOUTH TWICE A DAY 60 Tab 0   • levoFLOXacin (LEVAQUIN) 750 MG tablet Take 1 Tab by mouth every day. 10 Tab 0   • glucose blood (ONE TOUCH ULTRA TEST) strip by Does not apply route.     • VITAMIN D, CHOLECALCIFEROL, PO Take  by mouth.     • ciclopirox (PENLAC) 8 % solution Apply 1 Application to affected area(s).     • glucose blood (ONE TOUCH ULTRA TEST) strip 1 Strip by Other route.     • ONETOUCH DELICA LANCETS FINE Misc by Does not apply route.     • ONETOUCH DELICA LANCETS FINE Misc 1 Each by Other route.     • glucose blood (ONE TOUCH ULTRA TEST) strip by Does not apply route.     • ONETOUCH DELICA LANCETS FINE Misc by Does not apply route.       No current facility-administered medications for this visit.      Allergies: Patient has no known allergies.  She  has a past medical history of Diabetes (HCC); Obesity; and PCOS (polycystic ovarian syndrome).  She  has a past surgical history that includes cholecystectomy.  Social History   Substance Use Topics   • Smoking status: Never Smoker   • Smokeless tobacco: Never Used   • Alcohol use No      Comment: Rarely     Social History     Social History Narrative   • No narrative on file     Family History   Problem Relation Age of Onset   • Hypertension Mother    • Diabetes Father    • Hyperlipidemia Father    • No Known Problems Sister    • Diabetes Brother      Family Status   Relation Status   • Mo Alive   • Fa Alive   • Sis (Not Specified)   • Bro (Not Specified)       ROS   Constitutional: Negative for fever, chills and weight loss.   HEENT: Negative for blurred vision, sore throat, swollen glands. No hearing loss or vertigo.  Respiratory: Negative for cough, wheezing, shortness of breath.   CVS: Negative for chest pain, palpitations, irregular heart beats, exertional dyspnea.   GI: Negative for heartburn, abdominal pain, nausea, vomiting, change in BMs.   : Negative for dysuria, polyuria.   MS: Negative for myalgias, joint pain.   Neuro: no headaches,  "numbness, weakness, seizures.   Skin: no skin lesions, rash.  Endocrine: per HPI.     PHYSICAL EXAM   Blood pressure 112/76, pulse 84, temperature 37.2 °C (99 °F), temperature source Temporal, resp. rate 14, height 1.575 m (5' 2.01\"), weight 73.8 kg (162 lb 11.2 oz), last menstrual period 11/04/2018, SpO2 100 %. Body mass index is 29.75 kg/m².  Alert, oriented in no acute distress.  Eye contact is good, speech goal directed, affect bright.  HEENT: EOMI, PERRL, conjunctiva non-injected, sclera non-icteric.  Nares patent with no significant congestion or drainage.  Normal pinnae, external auditory canals, TM pearly gray with normal light reflex bilaterally.  Oral mucous membranes pink and moist with no lesions.  Neck supple with no cervical lymphadenopathy, JVD, palpable thyroid nodules or carotid bruits.  Lungs: clear to auscultation bilaterally with good excursion.  CV: regular rate and rhythm, without murmur, rubs, thrills, or gallops.  Abdomen: soft, non-distended, non-tender with normal bowel sounds. No CVAT, No masses, or organomegaly.  Lower extremities: color normal, vascularity normal, no edema, temperature normal  Musculoskeletal: Normal gait. No obvious muscle weakness or wasting.  Psych: Normal mood and affect. Alert and oriented x3. Judgment and insight is normal.  Neuro:speech normal, mental status intact, cranial nerves 2-12 intact, gait, including heel, toe, and tandem walking normal, muscle tone normal, muscle strength normal, sensation to light touch and pinprick normal, reflexes normal and symmetric  DM foot exam: intact to pin prick, bilaterally.  Dorsalis pedis pulse is +4/4 bilaterally. No redness, ulcers, calluses.     LABS     Results reviewed and discussed with the patient, questions answered.    Last labs:  Lab Results   Component Value Date/Time    CHOLSTRLTOT 159 10/27/2018 07:54 AM    LDL 84 10/27/2018 07:54 AM    HDL 45 10/27/2018 07:54 AM    TRIGLYCERIDE 148 10/27/2018 07:54 AM       Lab " Results   Component Value Date/Time    SODIUM 137 10/27/2018 07:54 AM    POTASSIUM 4.1 10/27/2018 07:54 AM    CHLORIDE 106 10/27/2018 07:54 AM    CO2 23 10/27/2018 07:54 AM    GLUCOSE 155 (H) 10/27/2018 07:54 AM    BUN 16 10/27/2018 07:54 AM    CREATININE 0.66 10/27/2018 07:54 AM     Lab Results   Component Value Date/Time    ALKPHOSPHAT 79 10/27/2018 07:54 AM    ASTSGOT 12 10/27/2018 07:54 AM    ALTSGPT 16 10/27/2018 07:54 AM    TBILIRUBIN 0.8 10/27/2018 07:54 AM      Lab Results   Component Value Date/Time    HBA1C 7.1 (H) 10/27/2018 07:54 AM    HBA1C 7.2 05/08/2018 11:35 AM     No results found for: TSH  No results found for: FREET4    Lab Results   Component Value Date/Time    WBC 11.2 (H) 05/13/2018 07:12 PM    RBC 4.77 05/13/2018 07:12 PM    HEMOGLOBIN 13.1 05/13/2018 07:12 PM    HEMATOCRIT 39.3 05/13/2018 07:12 PM    MCV 82.4 05/13/2018 07:12 PM    MCH 27.5 05/13/2018 07:12 PM    MCHC 33.3 (L) 05/13/2018 07:12 PM    MPV 9.8 05/13/2018 07:12 PM    NEUTSPOLYS 57.90 05/13/2018 07:12 PM    LYMPHOCYTES 32.10 05/13/2018 07:12 PM    MONOCYTES 6.40 05/13/2018 07:12 PM    EOSINOPHILS 2.20 05/13/2018 07:12 PM    BASOPHILS 0.40 05/13/2018 07:12 PM        ASSESMENT AND PLAN     1. Controlled type 2 diabetes mellitus without complication, without long-term current use of insulin (HCC)  She will start taking metformin 1000+500 mg and continue fingersticks.  Discussed about:    - importance of appropriate diet and exercise.   - hypoglycemic symptoms and precautions.    - long-term complications of uncontrolled DM, (increased risk of CAD, strokes, retinopathy, nephropathy, /can lead to kidney failure, dialysis/, peripheral neuropathy, (can lead to amputation)   - Good DM control can prevent / delay many of these complications.    - Recommended appropriate foot care     - COMP METABOLIC PANEL; Future  - HEMOGLOBIN A1C; Future  - MICROALBUMIN CREAT RATIO URINE; Future  - metformin (GLUCOPHAGE) 1000 MG tablet; TAKE 1 TABLET BY  MOUTH TWICE A DAY  Dispense: 180 Tab; Refill: 1  - REFERRAL TO OPHTHALMOLOGY    2. Dyslipidemia  Controlled without medications, continue current treatment.   Congratulated for weight loss  - Lipid Profile; Future    3. PCOS (polycystic ovarian syndrome)  She has been followed up by fertility clinic.  On prenatal multivitamins    4.  Toenail fungus  May use topical OTC antifungal at this point    5. Health care maintenance  Reviewed PMH, PSH, FH, SH, ALL, MEDS, HCM/IMM.     All questions are answered.    Smoking Counseling: Nonsmoker    Time spent 25 minutes face to face, with > 50% spent counseling and coordinating care.      Followup: Return in about 4 months (around 3/12/2019) for Short.

## 2019-04-15 ENCOUNTER — APPOINTMENT (OUTPATIENT)
Dept: MEDICAL GROUP | Facility: MEDICAL CENTER | Age: 34
End: 2019-04-15
Payer: COMMERCIAL

## 2019-09-30 DIAGNOSIS — E11.9 CONTROLLED TYPE 2 DIABETES MELLITUS WITHOUT COMPLICATION, WITHOUT LONG-TERM CURRENT USE OF INSULIN (HCC): ICD-10-CM

## 2019-10-08 ENCOUNTER — PATIENT MESSAGE (OUTPATIENT)
Dept: MEDICAL GROUP | Facility: MEDICAL CENTER | Age: 34
End: 2019-10-08

## 2019-10-08 DIAGNOSIS — E11.9 CONTROLLED TYPE 2 DIABETES MELLITUS WITHOUT COMPLICATION, WITHOUT LONG-TERM CURRENT USE OF INSULIN (HCC): ICD-10-CM

## 2019-10-08 NOTE — TELEPHONE ENCOUNTER
From: Stefania Renteria  To: Tatum Nye M.D.  Sent: 10/8/2019 10:59 AM PDT  Subject: Prescription Question    Hello doctor, I'm due for a refill on my metformin and testing strips. Do I need to schedule an appointment for this? Thank you, Stefania

## 2019-10-10 ENCOUNTER — OFFICE VISIT (OUTPATIENT)
Dept: MEDICAL GROUP | Facility: MEDICAL CENTER | Age: 34
End: 2019-10-10
Payer: COMMERCIAL

## 2019-10-10 VITALS
HEART RATE: 92 BPM | TEMPERATURE: 99.2 F | SYSTOLIC BLOOD PRESSURE: 124 MMHG | WEIGHT: 183.2 LBS | OXYGEN SATURATION: 98 % | HEIGHT: 62 IN | DIASTOLIC BLOOD PRESSURE: 80 MMHG | BODY MASS INDEX: 33.71 KG/M2

## 2019-10-10 DIAGNOSIS — E11.9 CONTROLLED TYPE 2 DIABETES MELLITUS WITHOUT COMPLICATION, WITHOUT LONG-TERM CURRENT USE OF INSULIN (HCC): ICD-10-CM

## 2019-10-10 DIAGNOSIS — Z3A.01 6 WEEKS GESTATION OF PREGNANCY: ICD-10-CM

## 2019-10-10 DIAGNOSIS — R53.83 FATIGUE, UNSPECIFIED TYPE: ICD-10-CM

## 2019-10-10 DIAGNOSIS — E55.9 HYPOVITAMINOSIS D: ICD-10-CM

## 2019-10-10 DIAGNOSIS — E78.5 DYSLIPIDEMIA: ICD-10-CM

## 2019-10-10 DIAGNOSIS — Z91.199 NONCOMPLIANCE: ICD-10-CM

## 2019-10-10 DIAGNOSIS — E66.9 OBESITY (BMI 30.0-34.9): ICD-10-CM

## 2019-10-10 PROCEDURE — 99214 OFFICE O/P EST MOD 30 MIN: CPT | Performed by: INTERNAL MEDICINE

## 2019-10-10 NOTE — PROGRESS NOTES
CHIEF COMPLAINT  Chief Complaint   Patient presents with   • Medication Refill     Metformin   • Other     Pregnant   Not seen in the office for 1 year    HPI  Stefania Renteria is a 34 y.o. female who presents today for the following     DM 2, non-compliance  Interval course  -No significant change, A1c 7.1, was 7.2  - no labs for 1 year     Onset/D29 y/o, in   Diabetes education:  Y     Medications:   • Metformin:  1000 mg BID, did not take regularly  • ACE/ARB: N  • Statin: N  • ASA: N  Compliant with medications: yes  Checking feet daily/wear soft socks/shoes: advised     Diabetes ABCDE TARGETS  • A1c, last:  7.1, was 7.2  • Fingersticks:  daily  • Mean BS: 70 - 150  • Hypoglycemia:  No  o No symptoms of hypoglycemia: tremors, hunger, sometimes dizzy  • Blood Pressure, goal < 140/90: yes  • Cholesterol-Lipid Panel: abn  • Dysalbuminuria:  neg     Diet:  regular  Exercise:  advised at least 4 d/w  BMI: 33     DM complications:  • Peripheral neuropathy:           No numbness or tingling in feet.  • Retinopathy:                            Last eye exam: 2018. No retinopathy.    • Nephropathy:                          Neg  • CVS:                                        No CAD.   • GI:                                           No gastropathy.     FH of DM:      Blood pressure  No meds  Diet / exercise / BMI: as above.   FH of HTN: mother     Dyslipidemia  The patient had a slightly abnormal lipid panel, normalized without medications.  Diet / exercise / BMI: as above.   FH: father     Hypovitaminosis D, fatigue  The patient had low vitamin D level.  C/o fatigue.  Vitamin D supplement: Prenatal multivitamins.    6 weeks pregnancy  The patient is 6 weeks pregnant after IVF, requested OB/GYN referral.    Obesity, Body mass index is 33.51kg/m²  Onset: childhood  Diet: regular  Exercise: Yes, 4 days a week  No temperature intolerance. No change in hair/skin quality, BMs.   No HTN, buffalo hump, purple striae,  flushing.  FH of obesity: mother, siblings     Reviewed PMH, PSH, FH, SH, ALL, HCM/IMM, no changes  Reviewed MEDS, no changes    Patient Active Problem List    Diagnosis Date Noted   • Controlled type 2 diabetes mellitus without complication, without long-term current use of insulin ([facility]) 05/01/2018   • Obesity (BMI 30.0-34.9) 05/01/2018   • Health care maintenance 05/01/2018   • Dyslipidemia 05/01/2018     CURRENT MEDICATIONS  Current Outpatient Medications   Medication Sig Dispense Refill   • metformin (GLUCOPHAGE) 1000 MG tablet TAKE 1 TABLET BY MOUTH TWICE A  Tab 1   • VITAMIN D, CHOLECALCIFEROL, PO Take  by mouth.     • ciclopirox (PENLAC) 8 % solution Apply 1 Application to affected area(s).     • glucose blood (ONE TOUCH ULTRA TEST) strip by Does not apply route.       No current facility-administered medications for this visit.      ALLERGIES  Allergies: Patient has no known allergies.  PAST MEDICAL HISTORY  Past Medical History:   Diagnosis Date   • Diabetes ([facility])    • Obesity    • PCOS (polycystic ovarian syndrome)      SURGICAL HISTORY  She  has a past surgical history that includes cholecystectomy.  SOCIAL HISTORY  Social History     Tobacco Use   • Smoking status: Never Smoker   • Smokeless tobacco: Never Used   Substance Use Topics   • Alcohol use: No     Comment: Rarely   • Drug use: No     Social History     Social History Narrative   • Not on file     FAMILY HISTORY  Family History   Problem Relation Age of Onset   • Hypertension Mother    • Diabetes Father    • Hyperlipidemia Father    • No Known Problems Sister    • Diabetes Brother      Family Status   Relation Name Status   • Mo  Alive   • Fa  Alive   • Sis  (Not Specified)   • Bro  (Not Specified)     ROS   Constitutional: Negative for complaints of fatigue.  HENT: Negative for congestion, sore throat.  Eyes: Negative for vision problems.   Respiratory: Negative for cough, shortness of breath.  Cardiovascular: Negative for chest pain,  "palpitations.   Gastrointestinal: Negative for heartburn, nausea, abdominal pain.   Genitourinary: pregnant.  Musculoskeletal: Negative for significant myalgia, back and joint pain.   Skin: Negative for rash.   Neuro: Negative for dizziness, weakness and headaches.   Endo/Heme/Allergies: Does not bruise/bleed easily.   Psychiatric/Behavioral: Negative for depression.    PHYSICAL EXAM   Blood Pressure 124/80   Pulse 92   Temperature 37.3 °C (99.2 °F) (Temporal)   Height 1.575 m (5' 2\")   Weight 83.1 kg (183 lb 3.2 oz)   Oxygen Saturation 98%  Body mass index is 33.51 kg/m².  General:  NAD, well appearing  HEENT:   NC/AT, PERRLA, EOMI, TMs are clear. Oropharyngeal mucosa is pink,  without lesions;  no cervical / supraclavicular  lymphadenopathy, no thyromegaly.    Cardiovascular: RRR.   No m/r/g.       Lungs:   CTAB, no w/r/r, no respiratory distress.  Abdomen: Soft, NT/ND.  Extremities:  2+ DP and radial pulses bilaterally.  No c/c/e.   Skin:  Warm, dry.  No erythema. No rash.   Neurologic: Alert & oriented x 3. CN II-XII grossly intact. No focal deficits.  Psychiatric:  Affect normal, mood normal, judgment normal.    Labs     Labs are reviewed and discussed with a patient  Lab Results   Component Value Date/Time    CHOLSTRLTOT 159 10/27/2018 07:54 AM    LDL 84 10/27/2018 07:54 AM    HDL 45 10/27/2018 07:54 AM    TRIGLYCERIDE 148 10/27/2018 07:54 AM       Lab Results   Component Value Date/Time    SODIUM 137 10/27/2018 07:54 AM    POTASSIUM 4.1 10/27/2018 07:54 AM    CHLORIDE 106 10/27/2018 07:54 AM    CO2 23 10/27/2018 07:54 AM    GLUCOSE 155 (H) 10/27/2018 07:54 AM    BUN 16 10/27/2018 07:54 AM    CREATININE 0.66 10/27/2018 07:54 AM     Lab Results   Component Value Date/Time    ALKPHOSPHAT 79 10/27/2018 07:54 AM    ASTSGOT 12 10/27/2018 07:54 AM    ALTSGPT 16 10/27/2018 07:54 AM    TBILIRUBIN 0.8 10/27/2018 07:54 AM      Lab Results   Component Value Date/Time    HBA1C 7.1 (H) 10/27/2018 07:54 AM    HBA1C 7.2 " 05/08/2018 11:35 AM     No results found for: TSH  No results found for: FREET4    Lab Results   Component Value Date/Time    WBC 11.2 (H) 05/13/2018 07:12 PM    RBC 4.77 05/13/2018 07:12 PM    HEMOGLOBIN 13.1 05/13/2018 07:12 PM    HEMATOCRIT 39.3 05/13/2018 07:12 PM    MCV 82.4 05/13/2018 07:12 PM    MCH 27.5 05/13/2018 07:12 PM    MCHC 33.3 (L) 05/13/2018 07:12 PM    MPV 9.8 05/13/2018 07:12 PM    NEUTSPOLYS 57.90 05/13/2018 07:12 PM    LYMPHOCYTES 32.10 05/13/2018 07:12 PM    MONOCYTES 6.40 05/13/2018 07:12 PM    EOSINOPHILS 2.20 05/13/2018 07:12 PM    BASOPHILS 0.40 05/13/2018 07:12 PM        Imaging     None    Assessment and Plan     Stefania Renteria is a 34 y.o. female    1. Controlled type 2 diabetes mellitus without complication, without long-term current use of insulin (HCC)  Pending labs, continue current treatment  - POCT Retinal Eye Exam  - metformin (GLUCOPHAGE) 1000 MG tablet; TAKE 1 TABLET BY MOUTH TWICE A DAY  Dispense: 180 Tab; Refill: 1  - Comp Metabolic Panel; Future  - HEMOGLOBIN A1C; Future  - MICROALBUMIN CREAT RATIO URINE; Future    2. Noncompliance  Discussed about further approach    3. Dyslipidemia  Pending labs  - Comp Metabolic Panel; Future  - Lipid Profile; Future    4. Hypovitaminosis D  Pending labs, continue current supplement  - VITAMIN D,25 HYDROXY; Future    5. Fatigue, unspecified type  Pending labs  - TSH; Future    6. 6 weeks gestation of pregnancy  Congratulated, especially after IVF, referral:  - REFERRAL TO OB/GYN    7. Obesity (BMI 30.0-34.9)  - OBESITY COUNSELING (No Charge): Patient identified as having weight management issue.  Appropriate orders and counseling given.    Counseling:   - Smoking:  Nonsmoker    Followup: Return in about 1 week (around 10/17/2019) for DM, Labs.    All questions are answered.    Please note that this dictation was created using voice recognition software, and that there might be errors of dolores and possibly content.

## 2019-10-15 LAB
25(OH)D3+25(OH)D2 SERPL-MCNC: 20.9 NG/ML (ref 30–100)
ALBUMIN SERPL-MCNC: 3.9 G/DL (ref 3.5–5.5)
ALBUMIN/CREAT UR: 36.6 MG/G CREAT (ref 0–30)
ALBUMIN/GLOB SERPL: 1.2 {RATIO} (ref 1.2–2.2)
ALP SERPL-CCNC: 79 IU/L (ref 39–117)
ALT SERPL-CCNC: 18 IU/L (ref 0–32)
AST SERPL-CCNC: 14 IU/L (ref 0–40)
BILIRUB SERPL-MCNC: 0.5 MG/DL (ref 0–1.2)
BUN SERPL-MCNC: 9 MG/DL (ref 6–20)
BUN/CREAT SERPL: 14 (ref 9–23)
CALCIUM SERPL-MCNC: 9.2 MG/DL (ref 8.7–10.2)
CHLORIDE SERPL-SCNC: 102 MMOL/L (ref 96–106)
CHOLEST SERPL-MCNC: 204 MG/DL (ref 100–199)
CO2 SERPL-SCNC: 22 MMOL/L (ref 20–29)
CREAT SERPL-MCNC: 0.64 MG/DL (ref 0.57–1)
CREAT UR-MCNC: 291.9 MG/DL
GLOBULIN SER CALC-MCNC: 3.3 G/DL (ref 1.5–4.5)
GLUCOSE SERPL-MCNC: 115 MG/DL (ref 65–99)
HBA1C MFR BLD: 8.7 % (ref 4.8–5.6)
HDLC SERPL-MCNC: 71 MG/DL
LABORATORY COMMENT REPORT: ABNORMAL
LDLC SERPL CALC-MCNC: 85 MG/DL (ref 0–99)
MICROALBUMIN UR-MCNC: 106.9 UG/ML
POTASSIUM SERPL-SCNC: 4.1 MMOL/L (ref 3.5–5.2)
PROT SERPL-MCNC: 7.2 G/DL (ref 6–8.5)
SODIUM SERPL-SCNC: 139 MMOL/L (ref 134–144)
TRIGL SERPL-MCNC: 242 MG/DL (ref 0–149)
TSH SERPL DL<=0.005 MIU/L-ACNC: 2.67 UIU/ML (ref 0.45–4.5)
VLDLC SERPL CALC-MCNC: 48 MG/DL (ref 5–40)

## 2019-10-18 ENCOUNTER — OFFICE VISIT (OUTPATIENT)
Dept: MEDICAL GROUP | Facility: MEDICAL CENTER | Age: 34
End: 2019-10-18
Payer: COMMERCIAL

## 2019-10-18 VITALS
OXYGEN SATURATION: 98 % | BODY MASS INDEX: 33.71 KG/M2 | DIASTOLIC BLOOD PRESSURE: 72 MMHG | TEMPERATURE: 98.3 F | HEART RATE: 78 BPM | WEIGHT: 183.2 LBS | SYSTOLIC BLOOD PRESSURE: 112 MMHG | HEIGHT: 62 IN

## 2019-10-18 DIAGNOSIS — E11.9 CONTROLLED TYPE 2 DIABETES MELLITUS WITHOUT COMPLICATION, WITHOUT LONG-TERM CURRENT USE OF INSULIN (HCC): ICD-10-CM

## 2019-10-18 DIAGNOSIS — Z3A.01 7 WEEKS GESTATION OF PREGNANCY: ICD-10-CM

## 2019-10-18 DIAGNOSIS — Z00.00 HEALTHCARE MAINTENANCE: ICD-10-CM

## 2019-10-18 DIAGNOSIS — E11.29 TYPE 2 DIABETES MELLITUS WITH MICROALBUMINURIA, WITHOUT LONG-TERM CURRENT USE OF INSULIN (HCC): ICD-10-CM

## 2019-10-18 DIAGNOSIS — E78.5 DYSLIPIDEMIA: ICD-10-CM

## 2019-10-18 DIAGNOSIS — E66.9 OBESITY (BMI 30.0-34.9): ICD-10-CM

## 2019-10-18 DIAGNOSIS — R80.9 TYPE 2 DIABETES MELLITUS WITH MICROALBUMINURIA, WITHOUT LONG-TERM CURRENT USE OF INSULIN (HCC): ICD-10-CM

## 2019-10-18 PROCEDURE — 99214 OFFICE O/P EST MOD 30 MIN: CPT | Performed by: INTERNAL MEDICINE

## 2019-10-18 NOTE — PROGRESS NOTES
CHIEF COMPLAINT  Chief Complaint   Patient presents with   • Other     8 days follow up   • Results     Labs   DM labs after 1 year    HPI  Stefania Renteria is a 34 y.o. female who presents today for the following     DM 2, non-compliance, microalbuminuria, 7 GW pregnancy  Interval course  33 y/o, 7 GW pregnant after IVF  - A1c 8.7, was 7.1    Onset/D27 y/o, in   Diabetes education:  Y    Medications:   • Metformin:  1000 mg BID, did not take regularly  • ACE/ARB: No, pregnant  • Statin: No pregnant  • ASA: No pregnant  Compliant with medications: yes  Checking feet daily/wear soft socks/shoes: advised     Diabetes ABCDE TARGETS  • A1c, last:  8.7, was 7.1  • Fingersticks:  daily  • Mean BS: up to 160  • Hypoglycemia:  No  o No symptoms of hypoglycemia: tremors, hunger, sometimes dizzy  • Blood Pressure, goal < 140/90: yes  • Cholesterol-Lipid Panel: abn  • Dysalbuminuria:  neg     Diet:  regular  Exercise:  advised at least 4 d/w  BMI: 33     DM complications:  • Peripheral neuropathy:           No numbness or tingling in feet.  • Retinopathy:                            Last eye exam: 2018. No retinopathy.    • Nephropathy:                          Neg  • CVS:                                        No CAD.   • GI:                                           No gastropathy.     FH of DM:      Blood pressure  No meds  Diet / exercise / BMI: as above.   FH of HTN: mother     Dyslipidemia  The patient had a slightly abnormal lipid panel, no meds.  Diet / exercise / BMI: as above.   FH: father     Obesity, Body mass index is 33.51kg/m²  Onset: childhood  Diet: regular  Exercise: Yes, 4 days a week  No temperature intolerance. No change in hair/skin quality, BMs.   No HTN, buffalo hump, purple striae, flushing.  FH of obesity: mother, siblings.    Reviewed PMH, PSH, FH, SH, ALL, HCM/IMM, no changes  Reviewed MEDS, no changes    Patient Active Problem List    Diagnosis Date Noted   • Controlled type 2 diabetes mellitus  without complication, without long-term current use of insulin (HCC) 05/01/2018   • Obesity (BMI 30.0-34.9) 05/01/2018   • Health care maintenance 05/01/2018   • Dyslipidemia 05/01/2018     CURRENT MEDICATIONS  Current Outpatient Medications   Medication Sig Dispense Refill   • metformin (GLUCOPHAGE) 1000 MG tablet TAKE 1 TABLET BY MOUTH TWICE A  Tab 1   • glucose blood (ONE TOUCH ULTRA TEST) strip 1 Strip by Other route every day. 100 Strip 11   • VITAMIN D, CHOLECALCIFEROL, PO Take  by mouth.     • ciclopirox (PENLAC) 8 % solution Apply 1 Application to affected area(s).       No current facility-administered medications for this visit.      ALLERGIES  Allergies: Patient has no known allergies.  PAST MEDICAL HISTORY  Past Medical History:   Diagnosis Date   • Diabetes (HCC)    • Obesity    • PCOS (polycystic ovarian syndrome)      SURGICAL HISTORY  She  has a past surgical history that includes cholecystectomy.  SOCIAL HISTORY  Social History     Tobacco Use   • Smoking status: Never Smoker   • Smokeless tobacco: Never Used   Substance Use Topics   • Alcohol use: No     Comment: Rarely   • Drug use: No     Social History     Social History Narrative   • Not on file     FAMILY HISTORY  Family History   Problem Relation Age of Onset   • Hypertension Mother    • Diabetes Father    • Hyperlipidemia Father    • No Known Problems Sister    • Diabetes Brother      Family Status   Relation Name Status   • Mo  Alive   • Fa  Alive   • Sis  (Not Specified)   • Bro  (Not Specified)     ROS   Constitutional: Negative for fever, chills, fatigue.  HENT: Negative for congestion, sore throat.  Eyes: Negative for vision problems.   Respiratory: Negative for cough, shortness of breath.  Cardiovascular: Negative for chest pain, palpitations.   Gastrointestinal: Negative for heartburn, nausea, abdominal pain.   Genitourinary: Negative for polyuria.  And as above.  Musculoskeletal: Negative for significant myalgia, back and  "joint pain.   Skin: Negative for rash.   Neuro: Negative for dizziness, weakness and headaches.   Endo/Heme/Allergies: Does not bruise/bleed easily.   Psychiatric/Behavioral: Negative for depression.    PHYSICAL EXAM   Blood Pressure 112/72   Pulse 78   Temperature 36.8 °C (98.3 °F) (Temporal)   Height 1.575 m (5' 2\")   Weight 83.1 kg (183 lb 3.2 oz)   Oxygen Saturation 98%  Body mass index is 33.51 kg/m².  General:  NAD, well appearing  HEENT:   NC/AT, PERRLA, EOMI, TMs are clear. Oropharyngeal mucosa is pink,  without lesions;  no cervical / supraclavicular  lymphadenopathy, no thyromegaly.    Cardiovascular: RRR.   No m/r/g.       Lungs:   CTAB, no w/r/r, no respiratory distress.  Abdomen: Soft, NT/ND; no hepatosplenomegaly.  Extremities:  2+ DP and radial pulses bilaterally.  No c/c/e.   Skin:  Warm, dry.  No erythema. No rash.   Neurologic: Alert & oriented x 3. CN II-XII grossly intact. No focal deficits.  Psychiatric:  Affect normal, mood normal, judgment normal.    Labs     Labs are reviewed and discussed with a patient  Lab Results   Component Value Date/Time    CHOLSTRLTOT 204 (H) 10/12/2019 05:39 AM    CHOLSTRLTOT 159 10/27/2018 07:54 AM    LDL 85 10/12/2019 05:39 AM    LDL 84 10/27/2018 07:54 AM    HDL 71 10/12/2019 05:39 AM    HDL 45 10/27/2018 07:54 AM    TRIGLYCERIDE 242 (H) 10/12/2019 05:39 AM    TRIGLYCERIDE 148 10/27/2018 07:54 AM       Lab Results   Component Value Date/Time    SODIUM 139 10/12/2019 05:39 AM    SODIUM 137 10/27/2018 07:54 AM    POTASSIUM 4.1 10/12/2019 05:39 AM    POTASSIUM 4.1 10/27/2018 07:54 AM    CHLORIDE 102 10/12/2019 05:39 AM    CHLORIDE 106 10/27/2018 07:54 AM    CO2 22 10/12/2019 05:39 AM    CO2 23 10/27/2018 07:54 AM    GLUCOSE 115 (H) 10/12/2019 05:39 AM    GLUCOSE 155 (H) 10/27/2018 07:54 AM    BUN 9 10/12/2019 05:39 AM    BUN 16 10/27/2018 07:54 AM    CREATININE 0.64 10/12/2019 05:39 AM    CREATININE 0.66 10/27/2018 07:54 AM    BUNCREATRAT 14 10/12/2019 05:39 AM "     Lab Results   Component Value Date/Time    ALKPHOSPHAT 79 10/12/2019 05:39 AM    ALKPHOSPHAT 79 10/27/2018 07:54 AM    ASTSGOT 14 10/12/2019 05:39 AM    ASTSGOT 12 10/27/2018 07:54 AM    ALTSGPT 18 10/12/2019 05:39 AM    ALTSGPT 16 10/27/2018 07:54 AM    TBILIRUBIN 0.5 10/12/2019 05:39 AM    TBILIRUBIN 0.8 10/27/2018 07:54 AM      Lab Results   Component Value Date/Time    HBA1C 8.7 (H) 10/12/2019 05:39 AM    HBA1C 7.1 (H) 10/27/2018 07:54 AM    HBA1C 7.2 05/08/2018 11:35 AM     Lab Results   Component Value Date/Time    TSH 2.670 10/12/2019 05:39 AM     No results found for: FREET4    Lab Results   Component Value Date/Time    WBC 11.2 (H) 05/13/2018 07:12 PM    RBC 4.77 05/13/2018 07:12 PM    HEMOGLOBIN 13.1 05/13/2018 07:12 PM    HEMATOCRIT 39.3 05/13/2018 07:12 PM    MCV 82.4 05/13/2018 07:12 PM    MCH 27.5 05/13/2018 07:12 PM    MCHC 33.3 (L) 05/13/2018 07:12 PM    MPV 9.8 05/13/2018 07:12 PM    NEUTSPOLYS 57.90 05/13/2018 07:12 PM    LYMPHOCYTES 32.10 05/13/2018 07:12 PM    MONOCYTES 6.40 05/13/2018 07:12 PM    EOSINOPHILS 2.20 05/13/2018 07:12 PM    BASOPHILS 0.40 05/13/2018 07:12 PM        Imaging     None    Assessment and Plan     Stefania Renteria is a 34 y.o. female    1. Controlled type 2 diabetes mellitus without complication, without long-term current use of insulin (HCC)  Advised daily exercise, low-carb/low fat diet  - metformin (GLUCOPHAGE) 1000 MG tablet; TAKE 1 TABLET BY MOUTH TWICE A DAY  Dispense: 180 Tab; Refill: 1  - Comp Metabolic Panel; Future  - HEMOGLOBIN A1C; Future  - MICROALBUMIN CREAT RATIO URINE; Future  - Lipid Profile; Future  - glucose blood (ONE TOUCH ULTRA TEST) strip; 1 Strip by Other route every day.  Dispense: 100 Strip; Refill: 11    2. Type 2 diabetes mellitus with microalbuminuria, without long-term current use of insulin (HCC)  Advice as above  - Comp Metabolic Panel; Future  - HEMOGLOBIN A1C; Future  - MICROALBUMIN CREAT RATIO URINE; Future  - Lipid Profile; Future    3.  7 weeks gestation of pregnancy  She established with OB/GYN    4. Dyslipidemia  Advised is per #1  - Comp Metabolic Panel; Future    5. Obesity (BMI 30.0-34.9)  Advised as above    6. Healthcare maintenance  Pending records for Pap smear.    Declined flu shot.    Counseling:   - Smoking:  Nonsmoker    Followup: depending on OB/GYN; needs diabetes follow-up in 3 months    All questions are answered.    Please note that this dictation was created using voice recognition software, and that there might be errors of dolores and possibly content.

## 2019-12-12 PROCEDURE — 76801 OB US < 14 WKS SINGLE FETUS: CPT

## 2019-12-12 PROCEDURE — 99285 EMERGENCY DEPT VISIT HI MDM: CPT

## 2019-12-13 ENCOUNTER — HOSPITAL ENCOUNTER (EMERGENCY)
Facility: MEDICAL CENTER | Age: 34
End: 2019-12-13
Payer: COMMERCIAL

## 2019-12-13 ENCOUNTER — HOSPITAL ENCOUNTER (INPATIENT)
Facility: MEDICAL CENTER | Age: 34
LOS: 1 days | DRG: 779 | End: 2019-12-13
Admitting: OBSTETRICS & GYNECOLOGY
Payer: COMMERCIAL

## 2019-12-13 ENCOUNTER — APPOINTMENT (OUTPATIENT)
Dept: RADIOLOGY | Facility: MEDICAL CENTER | Age: 34
DRG: 779 | End: 2019-12-13
Attending: EMERGENCY MEDICINE
Payer: COMMERCIAL

## 2019-12-13 LAB
BASOPHILS # BLD AUTO: 0.3 % (ref 0–1.8)
BASOPHILS # BLD: 0.07 K/UL (ref 0–0.12)
EOSINOPHIL # BLD AUTO: 0.01 K/UL (ref 0–0.51)
EOSINOPHIL NFR BLD: 0 % (ref 0–6.9)
ERYTHROCYTE [DISTWIDTH] IN BLOOD BY AUTOMATED COUNT: 45.7 FL (ref 35.9–50)
HCT VFR BLD AUTO: 34.8 % (ref 37–47)
HGB BLD-MCNC: 11.6 G/DL (ref 12–16)
IMM GRANULOCYTES # BLD AUTO: 0.34 K/UL (ref 0–0.11)
IMM GRANULOCYTES NFR BLD AUTO: 1.6 % (ref 0–0.9)
LYMPHOCYTES # BLD AUTO: 3.18 K/UL (ref 1–4.8)
LYMPHOCYTES NFR BLD: 15.4 % (ref 22–41)
MCH RBC QN AUTO: 29.3 PG (ref 27–33)
MCHC RBC AUTO-ENTMCNC: 33.3 G/DL (ref 33.6–35)
MCV RBC AUTO: 87.9 FL (ref 81.4–97.8)
MONOCYTES # BLD AUTO: 0.91 K/UL (ref 0–0.85)
MONOCYTES NFR BLD AUTO: 4.4 % (ref 0–13.4)
NEUTROPHILS # BLD AUTO: 16.11 K/UL (ref 2–7.15)
NEUTROPHILS NFR BLD: 78.3 % (ref 44–72)
NRBC # BLD AUTO: 0 K/UL
NRBC BLD-RTO: 0 /100 WBC
NUMBER OF RH DOSES IND 8505RD: NORMAL
PLATELET # BLD AUTO: 368 K/UL (ref 164–446)
PMV BLD AUTO: 10.2 FL (ref 9–12.9)
RBC # BLD AUTO: 3.96 M/UL (ref 4.2–5.4)
RH BLD: NORMAL
WBC # BLD AUTO: 20.6 K/UL (ref 4.8–10.8)

## 2019-12-13 PROCEDURE — 88300 SURGICAL PATH GROSS: CPT

## 2019-12-13 PROCEDURE — 81003 URINALYSIS AUTO W/O SCOPE: CPT

## 2019-12-13 PROCEDURE — 86901 BLOOD TYPING SEROLOGIC RH(D): CPT

## 2019-12-13 PROCEDURE — 88305 TISSUE EXAM BY PATHOLOGIST: CPT

## 2019-12-13 PROCEDURE — A9270 NON-COVERED ITEM OR SERVICE: HCPCS

## 2019-12-13 PROCEDURE — 770002 HCHG ROOM/CARE - OB PRIVATE (112)

## 2019-12-13 PROCEDURE — 700111 HCHG RX REV CODE 636 W/ 250 OVERRIDE (IP)

## 2019-12-13 PROCEDURE — 10D17Z9 MANUAL EXTRACTION OF PRODUCTS OF CONCEPTION, RETAINED, VIA NATURAL OR ARTIFICIAL OPENING: ICD-10-PCS | Performed by: OBSTETRICS & GYNECOLOGY

## 2019-12-13 PROCEDURE — 85025 COMPLETE CBC W/AUTO DIFF WBC: CPT

## 2019-12-13 PROCEDURE — 82962 GLUCOSE BLOOD TEST: CPT | Mod: 91

## 2019-12-13 PROCEDURE — 36415 COLL VENOUS BLD VENIPUNCTURE: CPT

## 2019-12-13 PROCEDURE — 700102 HCHG RX REV CODE 250 W/ 637 OVERRIDE(OP)

## 2019-12-13 RX ORDER — ACETAMINOPHEN 325 MG/1
650 TABLET ORAL EVERY 6 HOURS PRN
Status: DISCONTINUED | OUTPATIENT
Start: 2019-12-13 | End: 2019-12-13 | Stop reason: HOSPADM

## 2019-12-14 LAB
APPEARANCE UR: CLEAR
BASOPHILS # BLD AUTO: 0.3 % (ref 0–1.8)
BASOPHILS # BLD: 0.04 K/UL (ref 0–0.12)
BILIRUB UR QL STRIP.AUTO: NEGATIVE
COLOR UR: YELLOW
EOSINOPHIL # BLD AUTO: 0.18 K/UL (ref 0–0.51)
EOSINOPHIL NFR BLD: 1.5 % (ref 0–6.9)
ERYTHROCYTE [DISTWIDTH] IN BLOOD BY AUTOMATED COUNT: 45 FL (ref 35.9–50)
GLUCOSE UR STRIP.AUTO-MCNC: NEGATIVE MG/DL
HCT VFR BLD AUTO: 38.1 % (ref 37–47)
HGB BLD-MCNC: 12.7 G/DL (ref 12–16)
IMM GRANULOCYTES # BLD AUTO: 0.14 K/UL (ref 0–0.11)
IMM GRANULOCYTES NFR BLD AUTO: 1.1 % (ref 0–0.9)
KETONES UR STRIP.AUTO-MCNC: NEGATIVE MG/DL
LEUKOCYTE ESTERASE UR QL STRIP.AUTO: NEGATIVE
LYMPHOCYTES # BLD AUTO: 3.26 K/UL (ref 1–4.8)
LYMPHOCYTES NFR BLD: 26.4 % (ref 22–41)
MCH RBC QN AUTO: 28.9 PG (ref 27–33)
MCHC RBC AUTO-ENTMCNC: 33.3 G/DL (ref 33.6–35)
MCV RBC AUTO: 86.8 FL (ref 81.4–97.8)
MICRO URNS: NORMAL
MONOCYTES # BLD AUTO: 0.87 K/UL (ref 0–0.85)
MONOCYTES NFR BLD AUTO: 7.1 % (ref 0–13.4)
NEUTROPHILS # BLD AUTO: 7.85 K/UL (ref 2–7.15)
NEUTROPHILS NFR BLD: 63.6 % (ref 44–72)
NITRITE UR QL STRIP.AUTO: NEGATIVE
NRBC # BLD AUTO: 0 K/UL
NRBC BLD-RTO: 0 /100 WBC
PH UR STRIP.AUTO: 5.5 [PH] (ref 5–8)
PLATELET # BLD AUTO: 318 K/UL (ref 164–446)
PMV BLD AUTO: 10.2 FL (ref 9–12.9)
PROT UR QL STRIP: NEGATIVE MG/DL
RBC # BLD AUTO: 4.39 M/UL (ref 4.2–5.4)
RBC UR QL AUTO: NEGATIVE
SP GR UR STRIP.AUTO: 1.01
UROBILINOGEN UR STRIP.AUTO-MCNC: 0.2 MG/DL
WBC # BLD AUTO: 12.3 K/UL (ref 4.8–10.8)

## 2019-12-14 NOTE — DISCHARGE PLANNING
:    Referral: Fetal demise    Intervention:  Received a call from Rohan stating patient had a 15.1 week fetal demise.  SW met with parents: Stefania and Isabella Renteria.  MOB was sleeping.  Spoke with FOB and provided him with the grief resources and packet.  FOB stated they do not want to pick a  home and would like the hospital handle the arrangements.  Support provided to FOB.  Notified RN.    Plan:  Nothing further.

## 2019-12-14 NOTE — DISCHARGE INSTRUCTIONS
Please follow up with your OB in 2 weeks, continue regular diet routines and drink plenty of water.  Return to normal activities as you feel ready.  Call your doctor with any questions.

## 2019-12-14 NOTE — ED PROVIDER NOTES
"ED Provider Note    CHIEF COMPLAINT  No chief complaint on file.      HPI  Stefania Renteria is a 34 y.o. female 15 weeks pregnant A1  Patient presents for intermittent abnormal vaginal discharge onset earlier todya. She states that she got up after urinate when she felt \"water-based not sticky liquid that didnt smell of urine\" come out of her vagina. Patient reports a second episode later.Â  She also has had a headache, but states she has been hydrating well. Denies any vaginal discharge, vaginal bleeding, vomiting, or dysuria. Follows with Dr. Ruiz at [facility], and her pregnancy was confirmed via US.     REVIEW OF SYSTEMS  ROS  See HPI for further details. All other systems are negative.     PAST MEDICAL HISTORY   has a past medical history of Diabetes (HCC), Obesity, and PCOS (polycystic ovarian syndrome).    SOCIAL HISTORY  Social History     Tobacco Use   • Smoking status: Never Smoker   • Smokeless tobacco: Never Used   Substance and Sexual Activity   • Alcohol use: No     Comment: Rarely   • Drug use: No   • Sexual activity: Yes       SURGICAL HISTORY   has a past surgical history that includes cholecystectomy.    CURRENT MEDICATIONS  Home Medications    **Home medications have not yet been reviewed for this encounter**         ALLERGIES  No Known Allergies    PHYSICAL EXAM  Physical Exam   Constitutional: She is oriented to person, place, and time. She appears well-developed and well-nourished.   HENT:   Head: Normocephalic and atraumatic.   Eyes: Conjunctivae are normal.   Neck: Normal range of motion. Neck supple.   Cardiovascular: Normal rate and regular rhythm.   Pulmonary/Chest: Effort normal and breath sounds normal.   Abdominal: Soft. Bowel sounds are normal. She exhibits no distension. There is no tenderness. There is no rebound.   Neurological: She is alert and oriented to person, place, and time.   Skin: Skin is warm and dry. No rash noted.   Psychiatric: She has a normal mood and affect. " Her behavior is normal.     US-OB 1ST TRIMESTER WITH TRANSVAGINAL (COMBO)   Final Result         1.  Intrauterine pregnancy with negligible amniotic fluid, lower extremity appears to extend through the cervix. There is slight overlapping of skull sutures. Appearance favors spontaneous  in progress.      These findings were discussed with the patient's clinician, PARMINDER PAUL, at time of acquisition.      Final report delayed due to to unplanned network downtime.              COURSE & MEDICAL DECISION MAKING  Pertinent Labs & Imaging studies reviewed. (See chart for details)  Pt here w/ ssxs consistent with PROM in early pregnancy likely resulting in inevitable miscarriage. US confirms these suspicions. OB, dr shine called who will further evaluate patient on L/D and asked to have pt transferred to there care. Rh+    FINAL IMPRESSION  1. miscarriage    Patient seen during unscheduled epic downtime    Electronically signed by: Parminder Paul, 2019 10:04 AM

## 2019-12-14 NOTE — OP REPORT
DATE OF SERVICE:  2019    PROCEDURE PERFORMED:  Completion of missed  with forceps.    PREPROCEDURE DIAGNOSES:  1.  A 34-year-old  2, para 0-0-1-0 with intrauterine pregnancy at 15   weeks 1 day gestational age by an 11-week ultrasound.  2.  In vitro fertilization pregnancy.  3.  Advanced maternal age.  4.  Type 2 diabetes.  5.  Previable rupture of membranes.  6.  Medical  initiated, completed by instrument, removal of retained   products of conception.    POSTPROCEDURE DIAGNOSES:  1.  A 34-year-old  2, para 0-0-1-0 with intrauterine pregnancy at 15   weeks 1 day gestational age by an 11-week ultrasound.  2.  In vitro fertilization pregnancy.  3.  Advanced maternal age.  4.  Type 2 diabetes.  5.  Previable rupture of membranes.  6.  Medical  initiated, completed by instrument, removal of retained   products of conception.    PRIMARY OBSTETRICIAN:  Jerry Ruiz MD    OPERATING OBSTETRICIAN:  Andi Weber MD    ANESTHESIA:  None.    FINDINGS:  1.  Nonviable fetus consistent with gestational age.  Intraprocedure   hemorrhage of approximately 100 mL.  2.  Retrieved placental tissue appears intact.  3.  Uterus visualized on transabdominal ultrasound.  Uterine cavity noted to   be negative for material with color flow.    NARRATIVE:  This 34-year-old G2, P0-0-1-0 with intrauterine pregnancy at 15   weeks 1 day gestational age by an 11-week ultrasound, presented to the   emergency department with ruptured membranes.  This was diagnosed by Dr. Gómez.  When I assumed care, the patient had been receiving misoprostol for   completion of missed .  At 9:00 a.m. exactly, the patient was noted to   have expulsion of the fetus.  However, on digital exam, the placenta still   was above the cervix and not palpable.  We administered more misoprostol in an   attempt to complete the missed  medically, however, in the interim,   the patient continued to have small  amounts, but continual amounts of   bleeding.  The patient then put out a couple larger clots, but none as large   as the palm of her hand, but given the continued and slightly increased   bleeding.  I examined the cervix again digitally and was able to palpate   placental tissue at the cervical os.  Given her continued bleeding, I   discussed instrument removal of the placental tissue.  The patient agreed.  An   ultrasound was brought to the room.  A bivalve speculum was placed in the   vagina and the cervix was visualized.  The vaginal vault was suctioned clear   of blood and the placental tissue was seen remaining at the cervical os.    Using ring forceps, the placental tissue was gently teased out of the cervix.    The bleeding at the end of the removal was scant.  A transabdominal ultrasound   visualized the uterine cavity well.  There was some fluid in the cavity, but   no heterogeneous material and certainly no material that remained in with   color flow.  The patient tolerated the procedure well.  There were no   complications.  Sponges were not used nor were needles and thus were not   counted.  The patient will remain for recovery on labor and delivery, and we   will plan a repeat CBC at approximately 2 hours time after equilibrium.  If   this is relatively stable, the patient may be discharged home if her bleeding   is minimal.    Estimated blood loss during the procedure was 50 mL prior to this, and the   hour between the delivery of the fetus and the removal of the placental tissue   was approximately 1000 mL, noted gravimetrically with some moderate-sized   clots towards the end of this time period, prompting the removal of the   placental tissue with instruments.    SPECIMENS:  Fetus and placental tissue to be sent to pathology.    COMPLICATIONS:  Intraprocedure bleeding.    CONDITION:  Good.    DISPOSITION:  Birthing room, then home.       ____________________________________     Andi Weber  MD RENTERIA / KIN    DD:  12/13/2019 13:51:27  DT:  12/13/2019 16:33:17    D#:  [id number]  Job#:  [id number]

## 2019-12-14 NOTE — PROGRESS NOTES
Spiritual Care Note    Patient Information     Patient's Name: Stefania Renteria   MRN: [id number]    YOB: 1985   Age and Gender: 34 y.o. female   Service Area: LABOR & DELIVERY [facility]   Room (and Bed): [facility]/   Primary Language: English   Muslim/Spiritual preference: Spiritual   Place of Residence: [place], [place]   Family/Friends/Others Present: FOB, grandmother, aunt, 2 cousins   Clinical Team Present: nurse   Medical Diagnosis(-es)/Procedure(s): IUP   Code Status: No Order    Date of Admission: 12/13/2019   Length of Stay: 1 days        Spiritual Care Provider Information:  Name of Spiritual Care Provider: Rajani Pérez  Title of Spiritual Care Provider: Associate   Phone Number: [phone]  E-mail: [email or web address]  Total time : 25 minutes    Spiritual Screen Results:    Gen Nursing        Palliative Care         Encounter/Request Information  Encounter/Request Type   Visited With: Patient and family together  Nature of the Visit: Initial, On shift  Continue Visiting: No  Crisis Visit: Death, Major loss  Referral From/ Origin of Request: Verbal staff, Phone(L&D Nurse call for fetal demise)    Religous Needs/Values  Muslim Needs Visit    Muslim Needs: Prayer    Ritual Needs Visit    Ritual Needs: EOL ritual    Spiritual Assessment   Spiritual Care Encounters    Observations/Symptoms: Sadness, Tearful(Mother/Father, MIL, MORENO, 2 nieces,@BS)    Interacton/Conversation:  introduced self to parents and extended family. Family desired EOL ritual and prayer.  offered compassionate presence, Emotional Support.    Outcomes: Emotional Release, Spiritual Comfort, Value/Dignity/Respect    Plan: No Further Visits    Notes:

## 2019-12-15 NOTE — PROGRESS NOTES
Paper charting from downtime yesterday.  Placenta walked down to lab and delivered to [facility] since the lab changed their drop off location and the elissa said he would take it to them later.  This was delivered to him at 1930 on 12/13/2019.

## 2019-12-16 LAB
GLUCOSE BLD-MCNC: 243 MG/DL (ref 65–99)
GLUCOSE BLD-MCNC: 71 MG/DL (ref 65–99)
GLUCOSE BLD-MCNC: 86 MG/DL (ref 65–99)
GLUCOSE BLD-MCNC: 95 MG/DL (ref 65–99)
PATHOLOGY CONSULT NOTE: NORMAL

## 2020-01-15 DIAGNOSIS — E11.9 CONTROLLED TYPE 2 DIABETES MELLITUS WITHOUT COMPLICATION, WITHOUT LONG-TERM CURRENT USE OF INSULIN (HCC): ICD-10-CM

## 2020-03-25 ENCOUNTER — PATIENT MESSAGE (OUTPATIENT)
Dept: MEDICAL GROUP | Facility: MEDICAL CENTER | Age: 35
End: 2020-03-25

## 2020-03-26 ENCOUNTER — HOSPITAL ENCOUNTER (OUTPATIENT)
Dept: LAB | Facility: MEDICAL CENTER | Age: 35
End: 2020-03-26
Attending: INTERNAL MEDICINE
Payer: COMMERCIAL

## 2020-03-26 DIAGNOSIS — E11.9 CONTROLLED TYPE 2 DIABETES MELLITUS WITHOUT COMPLICATION, WITHOUT LONG-TERM CURRENT USE OF INSULIN (HCC): ICD-10-CM

## 2020-03-26 DIAGNOSIS — R80.9 TYPE 2 DIABETES MELLITUS WITH MICROALBUMINURIA, WITHOUT LONG-TERM CURRENT USE OF INSULIN (HCC): ICD-10-CM

## 2020-03-26 DIAGNOSIS — E78.5 DYSLIPIDEMIA: ICD-10-CM

## 2020-03-26 DIAGNOSIS — E11.29 TYPE 2 DIABETES MELLITUS WITH MICROALBUMINURIA, WITHOUT LONG-TERM CURRENT USE OF INSULIN (HCC): ICD-10-CM

## 2020-03-26 LAB
ALBUMIN SERPL BCP-MCNC: 4.3 G/DL (ref 3.2–4.9)
ALBUMIN/GLOB SERPL: 1.2 G/DL
ALP SERPL-CCNC: 70 U/L (ref 30–99)
ALT SERPL-CCNC: 17 U/L (ref 2–50)
ANION GAP SERPL CALC-SCNC: 13 MMOL/L (ref 7–16)
AST SERPL-CCNC: 12 U/L (ref 12–45)
BILIRUB SERPL-MCNC: 0.4 MG/DL (ref 0.1–1.5)
BUN SERPL-MCNC: 13 MG/DL (ref 8–22)
CALCIUM SERPL-MCNC: 9 MG/DL (ref 8.5–10.5)
CHLORIDE SERPL-SCNC: 105 MMOL/L (ref 96–112)
CHOLEST SERPL-MCNC: 185 MG/DL (ref 100–199)
CO2 SERPL-SCNC: 21 MMOL/L (ref 20–33)
CREAT SERPL-MCNC: 0.53 MG/DL (ref 0.5–1.4)
CREAT UR-MCNC: 141.95 MG/DL
EST. AVERAGE GLUCOSE BLD GHB EST-MCNC: 111 MG/DL
FASTING STATUS PATIENT QL REPORTED: NORMAL
GLOBULIN SER CALC-MCNC: 3.6 G/DL (ref 1.9–3.5)
GLUCOSE SERPL-MCNC: 89 MG/DL (ref 65–99)
HBA1C MFR BLD: 5.5 % (ref 0–5.6)
HDLC SERPL-MCNC: 54 MG/DL
LDLC SERPL CALC-MCNC: 107 MG/DL
MICROALBUMIN UR-MCNC: 4.4 MG/DL
MICROALBUMIN/CREAT UR: 31 MG/G (ref 0–30)
POTASSIUM SERPL-SCNC: 4 MMOL/L (ref 3.6–5.5)
PROT SERPL-MCNC: 7.9 G/DL (ref 6–8.2)
SODIUM SERPL-SCNC: 139 MMOL/L (ref 135–145)
TRIGL SERPL-MCNC: 121 MG/DL (ref 0–149)

## 2020-03-26 PROCEDURE — 36415 COLL VENOUS BLD VENIPUNCTURE: CPT

## 2020-03-26 PROCEDURE — 82043 UR ALBUMIN QUANTITATIVE: CPT

## 2020-03-26 PROCEDURE — 83036 HEMOGLOBIN GLYCOSYLATED A1C: CPT

## 2020-03-26 PROCEDURE — 80061 LIPID PANEL: CPT

## 2020-03-26 PROCEDURE — 82570 ASSAY OF URINE CREATININE: CPT

## 2020-03-26 PROCEDURE — 80053 COMPREHEN METABOLIC PANEL: CPT

## 2020-03-27 ENCOUNTER — OFFICE VISIT (OUTPATIENT)
Dept: MEDICAL GROUP | Age: 35
End: 2020-03-27
Payer: COMMERCIAL

## 2020-03-27 VITALS
OXYGEN SATURATION: 98 % | BODY MASS INDEX: 34.3 KG/M2 | HEART RATE: 82 BPM | DIASTOLIC BLOOD PRESSURE: 58 MMHG | SYSTOLIC BLOOD PRESSURE: 110 MMHG | HEIGHT: 62 IN | TEMPERATURE: 99.1 F | WEIGHT: 186.4 LBS

## 2020-03-27 DIAGNOSIS — E11.9 CONTROLLED TYPE 2 DIABETES MELLITUS WITHOUT COMPLICATION, WITHOUT LONG-TERM CURRENT USE OF INSULIN (HCC): ICD-10-CM

## 2020-03-27 DIAGNOSIS — E78.5 DYSLIPIDEMIA: ICD-10-CM

## 2020-03-27 DIAGNOSIS — E66.9 OBESITY (BMI 30.0-34.9): ICD-10-CM

## 2020-03-27 DIAGNOSIS — Z00.00 HEALTH CARE MAINTENANCE: ICD-10-CM

## 2020-03-27 DIAGNOSIS — E55.9 HYPOVITAMINOSIS D: ICD-10-CM

## 2020-03-27 DIAGNOSIS — D64.9 ANEMIA, UNSPECIFIED TYPE: ICD-10-CM

## 2020-03-27 PROCEDURE — 99214 OFFICE O/P EST MOD 30 MIN: CPT | Performed by: INTERNAL MEDICINE

## 2020-03-27 ASSESSMENT — PATIENT HEALTH QUESTIONNAIRE - PHQ9: CLINICAL INTERPRETATION OF PHQ2 SCORE: 0

## 2020-03-27 ASSESSMENT — FIBROSIS 4 INDEX: FIB4 SCORE: 0.28

## 2020-03-27 NOTE — PROGRESS NOTES
CHIEF COMPLAINT  DM2    HPI  Stefania Rneteria is a 34 y.o. female who presents today for the following        DM 2, non-compliance, microalbuminuria  Interval course   HBA1C 5.5 2020 07:33 AM    HBA1C 8.7 (H) 10/12/2019 05:39 AM    HBA1C 7.1 (H) 10/27/2018 07:54 AM    HBA1C 7.2 2018 11:35 AM     Onset/D29 y/o, in   Diabetes education:  Y     Medications:   • Lantus 40 U HS  • Humalog 10 + 10 + 6 units  • Metformin:  1000 mg BID, did not take regularly  • ACE/ARB: No, pregnant  • Statin: No pregnant  • ASA: No pregnant  Compliant with medications: yes  Checking feet daily/wear soft socks/shoes: advised     Diabetes ABCDE TARGETS  • Fingersticks:  mary, ranging   • Hypoglycemia:  yes in 40-60, resolved by apple  • Blood Pressure, goal < 140/90: yes  • Cholesterol-Lipid Panel: abn  • Dysalbuminuria:  neg     Diet:  regular  Exercise:  advised at least 4 d/w  BMI: 34     DM complications:  • Peripheral neuropathy:           No numbness or tingling in feet.  • Retinopathy:                            Last eye exam: 2018. No retinopathy.    • Nephropathy:                          Neg  • CVS:                                        No CAD.   • GI:                                           No gastropathy.     FH of DM:      Blood pressure  Blood pressure has been within normal limits; no meds  Diet / exercise / BMI: as above.   FH of HTN: mother     Dyslipidemia  The patient had a slightly abnormal lipid panel, no meds.  Diet / exercise / BMI: as above.   FH: father     Hypovitaminosis D  The patient had low vitamin D level.  Vitamin D supplement: On prenatal multivitamins.    Anemia  The patient developed anemia when she had miscarriage in 2019.  She has been on prenatal multivitamins.  Denies lightheadedness, dizziness, anorexia, fatigue.     BMI 34  Onset: Since childhood  Diet: regular  Exercise: Yes, 4 days a week  No temperature intolerance. No change in hair/skin quality, BMs.   No HTN,  buffalo hump, purple striae, flushing.  FH of obesity: mother, siblings.     Reviewed PMH, PSH, FH, SH, ALL, HCM/IMM, no changes  Reviewed MEDS, no changes    Patient Active Problem List    Diagnosis Date Noted   • Controlled type 2 diabetes mellitus without complication, without long-term current use of insulin ([facility]) 05/01/2018   • Obesity (BMI 30.0-34.9) 05/01/2018   • Health care maintenance 05/01/2018   • Dyslipidemia 05/01/2018     CURRENT MEDICATIONS  Current Outpatient Medications   Medication Sig Dispense Refill   • metformin (GLUCOPHAGE) 1000 MG tablet TAKE 1 TABLET BY MOUTH TWICE A  Tab 1   • glucose blood (ONE TOUCH ULTRA TEST) strip 1 Strip by Other route every day. 100 Strip 11   • VITAMIN D, CHOLECALCIFEROL, PO Take  by mouth.     • ciclopirox (PENLAC) 8 % solution Apply 1 Application to affected area(s).       No current facility-administered medications for this visit.      ALLERGIES  Allergies: Patient has no known allergies.  PAST MEDICAL HISTORY  Past Medical History:   Diagnosis Date   • Diabetes (HCC)    • Obesity    • PCOS (polycystic ovarian syndrome)      SURGICAL HISTORY  She  has a past surgical history that includes cholecystectomy.  SOCIAL HISTORY  Social History     Tobacco Use   • Smoking status: Never Smoker   • Smokeless tobacco: Never Used   Substance Use Topics   • Alcohol use: No     Comment: Rarely   • Drug use: No     Social History     Social History Narrative   • Not on file     FAMILY HISTORY  Family History   Problem Relation Age of Onset   • Hypertension Mother    • Diabetes Father    • Hyperlipidemia Father    • No Known Problems Sister    • Diabetes Brother      Family Status   Relation Name Status   • Mo  Alive   • Fa  Alive   • Sis  (Not Specified)   • Bro  (Not Specified)     ROS   Constitutional: Negative for fever, chills.  HENT: Negative for congestion, sore throat.  Eyes: Negative for vision problems.   Respiratory: Negative for cough, shortness of  "breath.  Cardiovascular: Negative for chest pain, palpitations.   Gastrointestinal: Negative for heartburn, nausea, abdominal pain.   Genitourinary: Negative for dysuria.  Musculoskeletal: Negative for significant myalgia, back and joint pain.   Skin: Negative for rash.   Neuro: Negative for dizziness, weakness and headaches.   Endo/Heme/Allergies: Does not bruise/bleed easily.   Psychiatric/Behavioral: Negative for depression.    PHYSICAL EXAM   Blood Pressure 110/58 (BP Location: Left arm, Patient Position: Sitting, BP Cuff Size: Adult)   Pulse 82   Temperature 37.3 °C (99.1 °F)   Height 1.575 m (5' 2\")   Weight 84.6 kg (186 lb 6.4 oz)   Oxygen Saturation 98%   Body Mass Index 34.09 kg/m²   General:  NAD, well appearing  HEENT:   NC/AT, PERRLA, EOMI, TMs are clear. Oropharyngeal mucosa is pink,  without lesions;  no cervical / supraclavicular  lymphadenopathy, no thyromegaly.    Cardiovascular: RRR.   No m/r/g.       Lungs:   CTAB, no w/r/r, no respiratory distress.  Abdomen: Soft, NT/ND; no hepatosplenomegaly.  Extremities:  2+ DP and radial pulses bilaterally.  No c/c/e.   Skin:  Warm, dry.  No erythema. No rash.   Neurologic: Alert & oriented x 3. CN II-XII grossly intact. No focal deficits.  Psychiatric:  Affect normal, mood normal, judgment normal.    Labs     Labs are reviewed and discussed with a patient  Lab Results   Component Value Date/Time    CHOLSTRLTOT 185 03/26/2020 07:33 AM     (H) 03/26/2020 07:33 AM    HDL 54 03/26/2020 07:33 AM    TRIGLYCERIDE 121 03/26/2020 07:33 AM       Lab Results   Component Value Date/Time    SODIUM 139 03/26/2020 07:33 AM    POTASSIUM 4.0 03/26/2020 07:33 AM    CHLORIDE 105 03/26/2020 07:33 AM    CO2 21 03/26/2020 07:33 AM    GLUCOSE 89 03/26/2020 07:33 AM    BUN 13 03/26/2020 07:33 AM    CREATININE 0.53 03/26/2020 07:33 AM    BUNCREATRAT 14 10/12/2019 05:39 AM     Lab Results   Component Value Date/Time    ALKPHOSPHAT 70 03/26/2020 07:33 AM    ASTSGOT 12 " 03/26/2020 07:33 AM    ALTSGPT 17 03/26/2020 07:33 AM    TBILIRUBIN 0.4 03/26/2020 07:33 AM      Lab Results   Component Value Date/Time    HBA1C 5.5 03/26/2020 07:33 AM    HBA1C 8.7 (H) 10/12/2019 05:39 AM    HBA1C 7.1 (H) 10/27/2018 07:54 AM    HBA1C 7.2 05/08/2018 11:35 AM     Lab Results   Component Value Date/Time    TSH 2.670 10/12/2019 05:39 AM     Lab Results   Component Value Date/Time    WBC 20.6 (H) 12/13/2019 02:42 PM    RBC 3.96 (L) 12/13/2019 02:42 PM    HEMOGLOBIN 11.6 (L) 12/13/2019 02:42 PM    HEMATOCRIT 34.8 (L) 12/13/2019 02:42 PM    MCV 87.9 12/13/2019 02:42 PM    MCH 29.3 12/13/2019 02:42 PM    MCHC 33.3 (L) 12/13/2019 02:42 PM    MPV 10.2 12/13/2019 02:42 PM    NEUTSPOLYS 78.30 (H) 12/13/2019 02:42 PM    LYMPHOCYTES 15.40 (L) 12/13/2019 02:42 PM    MONOCYTES 4.40 12/13/2019 02:42 PM    EOSINOPHILS 0.00 12/13/2019 02:42 PM    BASOPHILS 0.30 12/13/2019 02:42 PM      Imaging     None    Assessment and Plan     Stefania Renteria is a 34 y.o. female    1. Controlled type 2 diabetes mellitus without complication, without long-term current use of insulin (HCC)  Diabetes management was done by fertility clinic  -The patient has schedule appointment with endocrinology in 1 week  - Comp Metabolic Panel; Future  - HEMOGLOBIN A1C; Future  - MICROALBUMIN CREAT RATIO URINE; Future  - metformin (GLUCOPHAGE) 1000 MG tablet; TAKE 1 TABLET BY MOUTH TWICE A DAY  Dispense: 180 Tab; Refill: 1  - insulin glargine (LANTUS) 100 UNIT/ML Solution Pen-injector injection; Inject 40 Units as instructed every evening.  Dispense: 15 PEN; Refill: 5    2. Dyslipidemia  Controlled, continue current treatment  - Comp Metabolic Panel; Future  - Lipid Profile; Future    3. Hypovitaminosis D  Advised 2000 units vitamin D daily  - VITAMIN D,25 HYDROXY; Future    4. Anemia, unspecified type  Follow-up labs  - CBC WITH DIFFERENTIAL; Future    5. Obesity (BMI 30.0-34.9)  - OBESITY COUNSELING (No Charge): Patient identified as having weight  management issue.  Appropriate orders and counseling given.    6. Health care maintenance  Pending records for Pap smear    Counseling:   - Smoking:  Nonsmoker    Followup: In 6 months, and as needed    All questions are answered.    Please note that this dictation was created using voice recognition software, and that there might be errors of dolores and possibly content.

## 2020-03-30 RX ORDER — INSULIN GLARGINE 100 [IU]/ML
40 INJECTION, SOLUTION SUBCUTANEOUS EVERY EVENING
Qty: 5 PEN | Refills: 0 | Status: SHIPPED | OUTPATIENT
Start: 2020-03-30 | End: 2020-04-01

## 2020-04-01 ENCOUNTER — OFFICE VISIT (OUTPATIENT)
Dept: ENDOCRINOLOGY | Facility: MEDICAL CENTER | Age: 35
End: 2020-04-01
Payer: COMMERCIAL

## 2020-04-01 DIAGNOSIS — E66.9 OBESITY (BMI 30.0-34.9): ICD-10-CM

## 2020-04-01 DIAGNOSIS — E78.5 DYSLIPIDEMIA: ICD-10-CM

## 2020-04-01 DIAGNOSIS — Z79.4 TYPE 2 DIABETES MELLITUS WITH HYPOGLYCEMIA WITHOUT COMA, WITH LONG-TERM CURRENT USE OF INSULIN (HCC): ICD-10-CM

## 2020-04-01 DIAGNOSIS — E11.65 TYPE 2 DIABETES MELLITUS WITH HYPERGLYCEMIA (HCC): ICD-10-CM

## 2020-04-01 DIAGNOSIS — E11.649 TYPE 2 DIABETES MELLITUS WITH HYPOGLYCEMIA WITHOUT COMA, WITH LONG-TERM CURRENT USE OF INSULIN (HCC): ICD-10-CM

## 2020-04-01 DIAGNOSIS — Z79.4 LONG-TERM INSULIN USE (HCC): ICD-10-CM

## 2020-04-01 PROCEDURE — 99204 OFFICE O/P NEW MOD 45 MIN: CPT | Mod: 95,CR | Performed by: INTERNAL MEDICINE

## 2020-04-01 RX ORDER — PROCHLORPERAZINE 25 MG/1
1 SUPPOSITORY RECTAL
Qty: 2 EACH | Refills: 2 | Status: SHIPPED
Start: 2020-04-01 | End: 2020-09-10

## 2020-04-01 RX ORDER — PROCHLORPERAZINE 25 MG/1
1 SUPPOSITORY RECTAL
Qty: 9 EACH | Refills: 3 | Status: SHIPPED
Start: 2020-04-01 | End: 2020-09-10

## 2020-04-01 RX ORDER — PROCHLORPERAZINE 25 MG/1
1 SUPPOSITORY RECTAL CONTINUOUS
Qty: 1 DEVICE | Refills: 1 | Status: SHIPPED
Start: 2020-04-01 | End: 2020-09-10

## 2020-04-01 RX ORDER — INSULIN GLARGINE 100 [IU]/ML
15 INJECTION, SOLUTION SUBCUTANEOUS EVERY EVENING
Qty: 5 PEN | Refills: 3 | Status: SHIPPED | OUTPATIENT
Start: 2020-04-01 | End: 2020-04-02

## 2020-04-01 RX ORDER — INSULIN LISPRO 100 [IU]/ML
5 INJECTION, SOLUTION INTRAVENOUS; SUBCUTANEOUS
Qty: 5 PEN | Refills: 3 | Status: SHIPPED | OUTPATIENT
Start: 2020-04-01 | End: 2020-09-10

## 2020-04-01 NOTE — PROGRESS NOTES
"Chief Complaint:  Consult requested by Tatum Nye M.D. for initial evaluation of Type 2 Diabetes Mellitus.  This is a virtual health visit because of the coronavirus pandemic. Patient was presented for a telehealth consultation via secure and encrypted videoconferencing technology.  This encounter was conducted via Zoom . Verbal consent was obtained. Patient's identity was verified.    HPI:   Stefania Renteria is a 34 y.o. female with Type 2 Diabetes Mellitus diagnosed in 2014.  She denies hospitalizations for DKA in the past.    Her a1c is down to 5.5% on 3/2020.  She is on Metformin 1000mg bid and Humalog 10u  Before breakfast and lunch and 6 units for supper and Basaglar 40u bedtime.  She has been injecting insulin 4 times a day for the past 6 months.  She has been testing her sugars 4 times a day for the past 6 months.    She had a miscarriage on Dec 2019.  Since her miscarriage her insulin requirements have been dropping.  She prefers to stay on insulin because she is planning on future pregnancy again via IVF.    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 She denies episodes of severe hypoglycemia requiring third party assistance.  She  is not wearing a medical alert bracelet or necklace.  She does not a glucagon emergency kit.    She reports attending diabetes education classes.  Diet: \"healthy\" diet  in general.    Diabetes Complications   She  denies history of retinopathy.  She denies laser eye surgery. Last eye exam: She is unsure when her last eye exam was completed.  She denies history of peripheral sensory neuropathy.  She denies numbness, tingling in both feet.  She denies history of foot sores.   She denies history of kidney damage.  She is not on ACE inhibitor or ARB.   She denies history of coronary artery disease.  She  denies history of stroke and denies TIA.  She denies history of PAD.  She reports history of " hyperlipidemia.  She was originally on a statin but this was discontinued because of her last pregnancy      ROS:     CONS:     No fever, no chills, no weight loss, no fatigue   EYES:      No diplopia, no blurry vision, no redness of eyes, no swelling of eyelids   ENT:    No hearing loss, No ear pain, No sore throat, no dysphagia, no neck swelling   CV:     No chest pain, no palpitations, no claudication, no orthopnea, no PND   PULM:    No SOB, no cough, no hemoptysis, no wheezing    GI:   No nausea, no vomiting, no diarrhea, no constipation, no bloody stools   :  Passing urine well, no dysuria, no hematuria   ENDO:   No polyuria, no polydipsia, no heat intolerance, no cold intolerance   NEURO: No headaches, no dizziness, no convulsions, no tremors   MUSC:  No joint swellings, no arthralgias, no myalgias, no weakness   SKIN:   No rash, no ulcers, no dry skin   PSYCH:   No depression, no anxiety, no difficulty sleeping       Past Medical History:  Patient Active Problem List    Diagnosis Date Noted   • Controlled type 2 diabetes mellitus without complication, without long-term current use of insulin ([facility]) 05/01/2018   • Obesity (BMI 30.0-34.9) 05/01/2018   • Health care maintenance 05/01/2018   • Dyslipidemia 05/01/2018       Past Surgical History:  Past Surgical History:   Procedure Laterality Date   • CHOLECYSTECTOMY          Allergies:  Patient has no known allergies.     Current Medications:    Current Outpatient Medications:   •  insulin glargine (INSULIN GLARGINE) 100 UNIT/ML Solution Pen-injector injection, Inject 40 Units as instructed every evening., Disp: 5 PEN, Rfl: 0  •  insulin glargine (LANTUS) 100 UNIT/ML Solution Pen-injector injection, Inject 40 Units as instructed every evening., Disp: 16 PEN, Rfl: 0  •  metformin (GLUCOPHAGE) 1000 MG tablet, TAKE 1 TABLET BY MOUTH TWICE A DAY, Disp: 180 Tab, Rfl: 1  •  glucose blood (ONE TOUCH ULTRA TEST) strip, 1 Strip by Other route every day., Disp: 100 Strip,  Rfl: 11  •  VITAMIN D, CHOLECALCIFEROL, PO, Take  by mouth., Disp: , Rfl:   •  ciclopirox (PENLAC) 8 % solution, Apply 1 Application to affected area(s)., Disp: , Rfl:     Social History:  Social History     Socioeconomic History   • Marital status:      Spouse name: Not on file   • Number of children: Not on file   • Years of education: Not on file   • Highest education level: Not on file   Occupational History   • Not on file   Social Needs   • Financial resource strain: Not on file   • Food insecurity     Worry: Not on file     Inability: Not on file   • Transportation needs     Medical: Not on file     Non-medical: Not on file   Tobacco Use   • Smoking status: Never Smoker   • Smokeless tobacco: Never Used   Substance and Sexual Activity   • Alcohol use: No     Comment: Rarely   • Drug use: No   • Sexual activity: Yes   Lifestyle   • Physical activity     Days per week: Not on file     Minutes per session: Not on file   • Stress: Not on file   Relationships   • Social connections     Talks on phone: Not on file     Gets together: Not on file     Attends Anabaptist service: Not on file     Active member of club or organization: Not on file     Attends meetings of clubs or organizations: Not on file     Relationship status: Not on file   • Intimate partner violence     Fear of current or ex partner: Not on file     Emotionally abused: Not on file     Physically abused: Not on file     Forced sexual activity: Not on file   Other Topics Concern   • Not on file   Social History Narrative   • Not on file        Family History:   Family History   Problem Relation Age of Onset   • Hypertension Mother    • Diabetes Father    • Hyperlipidemia Father    • No Known Problems Sister    • Diabetes Brother        PHYSICAL EXAM:   Vital signs: There were no vitals taken for this visit.  GENERAL: Overweight female in no apparent distress.   EYE: No ocular and eyelid asymmetry, Anicteric sclerae,  PERRL, No exophthalmos or  lidlag  HENT: Hearing grossly intact, Normocephalic, atraumatic. Pink, moist mucous membranes, No exudate  NECK: Supple. Trachea midline. thyroid is normal in size without visible nodules   CARDIOVASCULAR: Normal precordial impulse seen, normal carotid pulsations seen  LUNGS: Symmetrical chest expansion with good phonation of voice  ABDOMEN: Slightly obese abdomen with no visible organomegaly or masses  EXTREMITIES: No clubbing, cyanosis, or edema.   NEUROLOGICAL: Cranial nerves II-XII are grossly intact   No visible tremor with both outstretched hands  LYMPH: No cervical, supraclavicular,  adenopathy seen  SKIN: No rashes, lesions. Turgor is normal.  FOOT: No calluses no ulcers.      Labs:  Lab Results   Component Value Date/Time    HBA1C 5.5 03/26/2020 0733    AVGLUC 111 03/26/2020 0733       Lab Results   Component Value Date/Time    WBC 20.6 (H) 12/13/2019 02:42 PM    RBC 3.96 (L) 12/13/2019 02:42 PM    HEMOGLOBIN 11.6 (L) 12/13/2019 02:42 PM    MCV 87.9 12/13/2019 02:42 PM    MCH 29.3 12/13/2019 02:42 PM    MCHC 33.3 (L) 12/13/2019 02:42 PM    RDW 45.7 12/13/2019 02:42 PM    MPV 10.2 12/13/2019 02:42 PM       Lab Results   Component Value Date/Time    SODIUM 139 03/26/2020 07:33 AM    POTASSIUM 4.0 03/26/2020 07:33 AM    CHLORIDE 105 03/26/2020 07:33 AM    CO2 21 03/26/2020 07:33 AM    ANION 13.0 03/26/2020 07:33 AM    GLUCOSE 89 03/26/2020 07:33 AM    BUN 13 03/26/2020 07:33 AM    CREATININE 0.53 03/26/2020 07:33 AM    CALCIUM 9.0 03/26/2020 07:33 AM    ASTSGOT 12 03/26/2020 07:33 AM    ALTSGPT 17 03/26/2020 07:33 AM    TBILIRUBIN 0.4 03/26/2020 07:33 AM    ALBUMIN 4.3 03/26/2020 07:33 AM    TOTPROTEIN 7.9 03/26/2020 07:33 AM    GLOBULIN 3.6 (H) 03/26/2020 07:33 AM    AGRATIO 1.2 03/26/2020 07:33 AM       Lab Results   Component Value Date/Time    CHOLSTRLTOT 185 03/26/2020 0733    TRIGLYCERIDE 121 03/26/2020 0733    HDL 54 03/26/2020 0733     (H) 03/26/2020 0733       Lab Results   Component Value  Date/Time    [facility] 31 (H) 03/26/2020 07:33 AM    MICROALBUR 4.4 03/26/2020 07:33 AM    MICRALB 106.9 10/12/2019 05:39 AM        No results found for: TSHULTRASEN  No results found for: FREEDIR  No results found for: FREET3  No results found for: THYSTIMIG      ASSESSMENT/PLAN:     1. Type 2 diabetes mellitus with hypoglycemia without coma, with long-term current use of insulin (HCC)  Uncontrolled  She is experiencing hypoglycemia because she is no longer pregnant.  Explained to patient that there is insulin resistance during pregnancy  Because she is no longer pregnant her insulin requirements are dropping  We need to lower her Lantus to 15 units daily  I want her to lower her NovoLog to 5 units 3 times a day with each meal  Continue Metformin 1000 mg twice a day  I am going to apply for Dexcom G6 CGM which will help her with blood sugar monitoring because she has a history of hypoglycemia and hypoglycemic unawareness and is on multiple daily insulin injections plus she is planning future pregnancy again which requires more frequent blood sugar monitoring  I recommend that she watch her carb intake  We will plan for follow-up in 4 to 6 weeks to review blood sugars    2. Dyslipidemia  Unstable  Because she is planning pregnancy again in the near future it makes sense to withhold statin therapy at this time    3. Obesity (BMI 30.0-34.9)  Stable  Discussed importance of diet and exercise and lifestyle changes    4. Long-term insulin use (HCC)  Patient is on long-term insulin therapy for now because she is planning to have future pregnancy again and it would be unwise to keep changing her medications      Return in about 6 weeks (around 5/13/2020).      Thank you kindly for allowing me to participate in the diabetes care plan for this patient.    Esvin Oseguera MD, FACE, [facility]  04/01/20    CC:   Tatum Nye M.D.

## 2020-04-02 ENCOUNTER — TELEPHONE (OUTPATIENT)
Dept: ENDOCRINOLOGY | Facility: MEDICAL CENTER | Age: 35
End: 2020-04-02

## 2020-04-02 RX ORDER — INSULIN DEGLUDEC 100 U/ML
15 INJECTION, SOLUTION SUBCUTANEOUS DAILY
Qty: 5 PEN | Refills: 3 | Status: SHIPPED | OUTPATIENT
Start: 2020-04-02 | End: 2020-04-03

## 2020-04-02 NOTE — TELEPHONE ENCOUNTER
Dr. Oseguera patient's insurance is not covering Lantus. They will cover basaglar or tresiba. Please advise, thank you.

## 2020-04-03 RX ORDER — INSULIN GLARGINE 100 [IU]/ML
15 INJECTION, SOLUTION SUBCUTANEOUS EVERY EVENING
Qty: 5 PEN | Refills: 3 | Status: SHIPPED | OUTPATIENT
Start: 2020-04-03 | End: 2020-06-16

## 2020-04-10 ENCOUNTER — PATIENT MESSAGE (OUTPATIENT)
Dept: ENDOCRINOLOGY | Facility: MEDICAL CENTER | Age: 35
End: 2020-04-10

## 2020-04-10 NOTE — TELEPHONE ENCOUNTER
From: Stefania Renteria  To: Esvin Oseguera M.D.  Sent: 4/10/2020 9:31 AM PDT  Subject: Non-Urgent Medical Question    Hello Dr. Oseguera,     Hope you are doing well. I received a call for Dexcom regarding the CGM. The great news is that I'm covered 90% from insurance. However, I forgot to ask is this something that can be safely used during pregnancy? If not would Jay be safe for pregnancy? Once it's safe again and I'm given the green light I'd like to try a frozen embryo transfer again and would like to make sure that I would be able to use the CGM during pregnancy.     On another note, I haven't been drinking any alcohol for over a year due to IVF and Insulin. I've been craving a glass of white wine. Is it safe to have just 1 glass?    I appreciate your assistance,  Stefania

## 2020-04-24 ENCOUNTER — PATIENT MESSAGE (OUTPATIENT)
Dept: ENDOCRINOLOGY | Facility: MEDICAL CENTER | Age: 35
End: 2020-04-24

## 2020-04-24 DIAGNOSIS — E11.649 TYPE 2 DIABETES MELLITUS WITH HYPOGLYCEMIA WITHOUT COMA, WITH LONG-TERM CURRENT USE OF INSULIN (HCC): ICD-10-CM

## 2020-04-24 DIAGNOSIS — Z79.4 TYPE 2 DIABETES MELLITUS WITH HYPOGLYCEMIA WITHOUT COMA, WITH LONG-TERM CURRENT USE OF INSULIN (HCC): ICD-10-CM

## 2020-04-24 RX ORDER — FLASH GLUCOSE SENSOR
1 KIT MISCELLANEOUS
Qty: 2 EACH | Refills: 11 | Status: SHIPPED
Start: 2020-04-24 | End: 2022-06-14

## 2020-04-24 RX ORDER — FLASH GLUCOSE SENSOR
1 KIT MISCELLANEOUS
Qty: 1 DEVICE | Refills: 1 | Status: SHIPPED
Start: 2020-04-24 | End: 2022-06-14

## 2020-04-24 NOTE — TELEPHONE ENCOUNTER
From: Stefania Renteria  To: Tierney Contreras  Sent: 4/24/2020 3:44 PM PDT  Subject: Non-Urgent Medical Question    Igor Monet,    Just left you a voicemail. Yes, Dr. Scott had first put in a request for Dexcom but my out of pocket for jay is more affordable and they stated that it would be safer to use during pregnancy. Please let me know if you need anything from me.     Thank you, Stefania       ----- Message -----   From:Tierney Contreras   Sent:4/24/2020 3:14 PM PDT   To:Stefania Renteria   Subject:RE: Non-Urgent Medical Question    Good afternoon Stefania,    I just left you a voicemail. So I spoke to [employer] and they told me they had cancelled the dexcom order because they had spoken to you about the freestyle jay and they let you know the freestyle jay was the one that was covered by your insurance at 90%. They are currently processing the jay request with the insurance and they are not needing a DWO. Please call me back or send me a message so we can clarify everything. You take care.    Thank you,    Ashley      ----- Message -----   From:Stefania Renteria   Sent:4/24/2020 2:48 PM PDT   To:Esvin Oseguera M.D.   Subject:RE: Non-Urgent Medical Question    Thank you, !       ----- Message -----   From:Esvin Oseguera M.D.   Sent:4/24/2020 2:24 PM PDT   To:Stefania Renteria   Subject:RE: Non-Urgent Medical Question    I am checking on the DWO among our faxes- I asked my head nurse to contact you as well once we confirm its receipt and signature      ----- Message -----   From:Stefania Renteria   Sent:4/24/2020 9:42 AM PDT   To:Esvin Oseguera M.D.   Subject:RE: Non-Urgent Medical Question    Courtney Scott,    Hope you are doing well. I followed up with the CGM supplier to get an update on the status of the device. It looks like they faxed over a DWO on 4/13 to you and just waiting on your signature. Please let me know if you need them to resend it and I can follow up with them. Also, I decided on the Jay instead of  the Dexcom.     Thank you,  Stefania       ----- Message -----    From:Esvin Oseguera M.D.   Sent:4/10/2020 12:30 PM PDT   To:Stefania Renteria   Subject:RE: Non-Urgent Medical Question    Yes these are safe to use during pregnancy      ----- Message -----   From:Stefania Renteria   Sent:4/10/2020 9:31 AM PDT   To:Esvin Oseguera M.D.   Subject:Non-Urgent Medical Question    Courtney Oseguera,     Hope you are doing well. I received a call for Dexcom regarding the CGM. The great news is that I'm covered 90% from insurance. However, I forgot to ask is this something that can be safely used during pregnancy? If not would Jay be safe for pregnancy? Once it's safe again and I'm given the green light I'd like to try a frozen embryo transfer again and would like to make sure that I would be able to use the CGM during pregnancy.     On another note, I haven't been drinking any alcohol for over a year due to IVF and Insulin. I've been craving a glass of white wine. Is it safe to have just 1 glass?    I appreciate your assistance,  Stefania

## 2020-04-24 NOTE — PATIENT COMMUNICATION
Called patient. She stated she forgot she spoke to them but indeed she prefers the tyesha because of cost. She would like us to process it.     Spoke to amarilis to clarify patient does want to have the tyesha. They said they are already processing the request nothing else is needed.

## 2020-06-16 RX ORDER — INSULIN GLARGINE 100 [IU]/ML
30 INJECTION, SOLUTION SUBCUTANEOUS EVERY EVENING
Qty: 5 PEN | Refills: 3 | Status: SHIPPED | OUTPATIENT
Start: 2020-06-16 | End: 2020-09-10

## 2020-07-01 DIAGNOSIS — Z79.4 TYPE 2 DIABETES MELLITUS WITH HYPOGLYCEMIA WITHOUT COMA, WITH LONG-TERM CURRENT USE OF INSULIN (HCC): ICD-10-CM

## 2020-07-01 DIAGNOSIS — E11.649 TYPE 2 DIABETES MELLITUS WITH HYPOGLYCEMIA WITHOUT COMA, WITH LONG-TERM CURRENT USE OF INSULIN (HCC): ICD-10-CM

## 2020-07-30 LAB
ABO GROUP BLD: NORMAL
HBV SURFACE AG SERPL QL IA: NORMAL
RH BLD: NORMAL
RUBV IGG SERPL IA-ACNC: NORMAL
TREPONEMA PALLIDUM IGG+IGM AB [PRESENCE] IN SERUM OR PLASMA BY IMMUNOASSAY: NON REACTIVE

## 2020-08-16 ENCOUNTER — ANESTHESIA EVENT (OUTPATIENT)
Dept: OBGYN | Facility: MEDICAL CENTER | Age: 35
End: 2020-08-16
Payer: COMMERCIAL

## 2020-08-16 ENCOUNTER — ANESTHESIA (OUTPATIENT)
Dept: OBGYN | Facility: MEDICAL CENTER | Age: 35
End: 2020-08-16
Payer: COMMERCIAL

## 2020-08-16 ENCOUNTER — HOSPITAL ENCOUNTER (OUTPATIENT)
Facility: MEDICAL CENTER | Age: 35
End: 2020-08-16
Attending: OBSTETRICS & GYNECOLOGY | Admitting: OBSTETRICS & GYNECOLOGY
Payer: COMMERCIAL

## 2020-08-16 VITALS
OXYGEN SATURATION: 97 % | SYSTOLIC BLOOD PRESSURE: 105 MMHG | DIASTOLIC BLOOD PRESSURE: 55 MMHG | HEART RATE: 87 BPM | BODY MASS INDEX: 33.49 KG/M2 | TEMPERATURE: 97.5 F | HEIGHT: 62 IN | RESPIRATION RATE: 18 BRPM | WEIGHT: 182 LBS

## 2020-08-16 LAB
BASOPHILS # BLD AUTO: 0.2 % (ref 0–1.8)
BASOPHILS # BLD: 0.02 K/UL (ref 0–0.12)
COVID ORDER STATUS COVID19: NORMAL
EOSINOPHIL # BLD AUTO: 0.13 K/UL (ref 0–0.51)
EOSINOPHIL NFR BLD: 1.4 % (ref 0–6.9)
ERYTHROCYTE [DISTWIDTH] IN BLOOD BY AUTOMATED COUNT: 49.1 FL (ref 35.9–50)
HCT VFR BLD AUTO: 37.7 % (ref 37–47)
HGB BLD-MCNC: 12.5 G/DL (ref 12–16)
HOLDING TUBE BB 8507: NORMAL
IMM GRANULOCYTES # BLD AUTO: 0.14 K/UL (ref 0–0.11)
IMM GRANULOCYTES NFR BLD AUTO: 1.5 % (ref 0–0.9)
LYMPHOCYTES # BLD AUTO: 2.59 K/UL (ref 1–4.8)
LYMPHOCYTES NFR BLD: 28.1 % (ref 22–41)
MCH RBC QN AUTO: 28.1 PG (ref 27–33)
MCHC RBC AUTO-ENTMCNC: 33.2 G/DL (ref 33.6–35)
MCV RBC AUTO: 84.7 FL (ref 81.4–97.8)
MONOCYTES # BLD AUTO: 0.71 K/UL (ref 0–0.85)
MONOCYTES NFR BLD AUTO: 7.7 % (ref 0–13.4)
NEUTROPHILS # BLD AUTO: 5.62 K/UL (ref 2–7.15)
NEUTROPHILS NFR BLD: 61.1 % (ref 44–72)
NRBC # BLD AUTO: 0 K/UL
NRBC BLD-RTO: 0 /100 WBC
PLATELET # BLD AUTO: 266 K/UL (ref 164–446)
PMV BLD AUTO: 10 FL (ref 9–12.9)
RBC # BLD AUTO: 4.45 M/UL (ref 4.2–5.4)
SARS-COV-2 RDRP RESP QL NAA+PROBE: NOTDETECTED
SPECIMEN SOURCE: NORMAL
WBC # BLD AUTO: 9.2 K/UL (ref 4.8–10.8)

## 2020-08-16 PROCEDURE — 700101 HCHG RX REV CODE 250: Performed by: ANESTHESIOLOGY

## 2020-08-16 PROCEDURE — C9803 HOPD COVID-19 SPEC COLLECT: HCPCS | Performed by: OBSTETRICS & GYNECOLOGY

## 2020-08-16 PROCEDURE — 160002 HCHG RECOVERY MINUTES (STAT): Performed by: OBSTETRICS & GYNECOLOGY

## 2020-08-16 PROCEDURE — 96376 TX/PRO/DX INJ SAME DRUG ADON: CPT

## 2020-08-16 PROCEDURE — 160028 HCHG SURGERY MINUTES - 1ST 30 MINS LEVEL 3: Performed by: OBSTETRICS & GYNECOLOGY

## 2020-08-16 PROCEDURE — 160048 HCHG OR STATISTICAL LEVEL 1-5: Performed by: OBSTETRICS & GYNECOLOGY

## 2020-08-16 PROCEDURE — 85025 COMPLETE CBC W/AUTO DIFF WBC: CPT

## 2020-08-16 PROCEDURE — 96374 THER/PROPH/DIAG INJ IV PUSH: CPT

## 2020-08-16 PROCEDURE — 160035 HCHG PACU - 1ST 60 MINS PHASE I: Performed by: OBSTETRICS & GYNECOLOGY

## 2020-08-16 PROCEDURE — 700111 HCHG RX REV CODE 636 W/ 250 OVERRIDE (IP): Performed by: ANESTHESIOLOGY

## 2020-08-16 PROCEDURE — 160046 HCHG PACU - 1ST 60 MINS PHASE II: Performed by: OBSTETRICS & GYNECOLOGY

## 2020-08-16 PROCEDURE — 160025 RECOVERY II MINUTES (STATS): Performed by: OBSTETRICS & GYNECOLOGY

## 2020-08-16 PROCEDURE — 160047 HCHG PACU  - EA ADDL 30 MINS PHASE II: Performed by: OBSTETRICS & GYNECOLOGY

## 2020-08-16 PROCEDURE — U0004 COV-19 TEST NON-CDC HGH THRU: HCPCS

## 2020-08-16 PROCEDURE — 700105 HCHG RX REV CODE 258: Performed by: ANESTHESIOLOGY

## 2020-08-16 PROCEDURE — 160009 HCHG ANES TIME/MIN: Performed by: OBSTETRICS & GYNECOLOGY

## 2020-08-16 RX ORDER — METOPROLOL TARTRATE 1 MG/ML
1 INJECTION, SOLUTION INTRAVENOUS
Status: CANCELLED | OUTPATIENT
Start: 2020-08-16

## 2020-08-16 RX ORDER — SODIUM CHLORIDE, SODIUM LACTATE, POTASSIUM CHLORIDE, CALCIUM CHLORIDE 600; 310; 30; 20 MG/100ML; MG/100ML; MG/100ML; MG/100ML
INJECTION, SOLUTION INTRAVENOUS CONTINUOUS
Status: CANCELLED | OUTPATIENT
Start: 2020-08-16

## 2020-08-16 RX ORDER — HALOPERIDOL 5 MG/ML
1 INJECTION INTRAMUSCULAR
Status: CANCELLED | OUTPATIENT
Start: 2020-08-16

## 2020-08-16 RX ORDER — HYDROMORPHONE HYDROCHLORIDE 1 MG/ML
0.2 INJECTION, SOLUTION INTRAMUSCULAR; INTRAVENOUS; SUBCUTANEOUS
Status: CANCELLED | OUTPATIENT
Start: 2020-08-16

## 2020-08-16 RX ORDER — HYDROMORPHONE HYDROCHLORIDE 1 MG/ML
0.4 INJECTION, SOLUTION INTRAMUSCULAR; INTRAVENOUS; SUBCUTANEOUS
Status: CANCELLED | OUTPATIENT
Start: 2020-08-16

## 2020-08-16 RX ORDER — OXYCODONE HCL 5 MG/5 ML
5 SOLUTION, ORAL ORAL
Status: CANCELLED | OUTPATIENT
Start: 2020-08-16

## 2020-08-16 RX ORDER — MEPERIDINE HYDROCHLORIDE 25 MG/ML
6.25 INJECTION INTRAMUSCULAR; INTRAVENOUS; SUBCUTANEOUS
Status: CANCELLED | OUTPATIENT
Start: 2020-08-16

## 2020-08-16 RX ORDER — HYDROMORPHONE HYDROCHLORIDE 1 MG/ML
0.6 INJECTION, SOLUTION INTRAMUSCULAR; INTRAVENOUS; SUBCUTANEOUS
Status: CANCELLED | OUTPATIENT
Start: 2020-08-16

## 2020-08-16 RX ORDER — ONDANSETRON 2 MG/ML
4 INJECTION INTRAMUSCULAR; INTRAVENOUS
Status: CANCELLED | OUTPATIENT
Start: 2020-08-16

## 2020-08-16 RX ORDER — SODIUM CHLORIDE, SODIUM GLUCONATE, SODIUM ACETATE, POTASSIUM CHLORIDE AND MAGNESIUM CHLORIDE 526; 502; 368; 37; 30 MG/100ML; MG/100ML; MG/100ML; MG/100ML; MG/100ML
500 INJECTION, SOLUTION INTRAVENOUS CONTINUOUS
Status: DISCONTINUED | OUTPATIENT
Start: 2020-08-16 | End: 2020-08-16 | Stop reason: HOSPADM

## 2020-08-16 RX ORDER — OXYCODONE HCL 5 MG/5 ML
10 SOLUTION, ORAL ORAL
Status: CANCELLED | OUTPATIENT
Start: 2020-08-16

## 2020-08-16 RX ORDER — BUPIVACAINE HYDROCHLORIDE 7.5 MG/ML
INJECTION, SOLUTION INTRASPINAL
Status: COMPLETED | OUTPATIENT
Start: 2020-08-16 | End: 2020-08-16

## 2020-08-16 RX ORDER — DIPHENHYDRAMINE HYDROCHLORIDE 50 MG/ML
12.5 INJECTION INTRAMUSCULAR; INTRAVENOUS
Status: CANCELLED | OUTPATIENT
Start: 2020-08-16

## 2020-08-16 RX ORDER — HYDRALAZINE HYDROCHLORIDE 20 MG/ML
5 INJECTION INTRAMUSCULAR; INTRAVENOUS
Status: CANCELLED | OUTPATIENT
Start: 2020-08-16

## 2020-08-16 RX ADMIN — SODIUM CHLORIDE, SODIUM GLUCONATE, SODIUM ACETATE, POTASSIUM CHLORIDE AND MAGNESIUM CHLORIDE 500 ML: 526; 502; 368; 37; 30 INJECTION, SOLUTION INTRAVENOUS at 11:03

## 2020-08-16 RX ADMIN — FENTANYL CITRATE 10 MCG: 50 INJECTION INTRAMUSCULAR; INTRAVENOUS at 12:02

## 2020-08-16 RX ADMIN — BUPIVACAINE HYDROCHLORIDE IN DEXTROSE 1 ML: 7.5 INJECTION, SOLUTION SUBARACHNOID at 12:02

## 2020-08-16 ASSESSMENT — COPD QUESTIONNAIRES
HAVE YOU SMOKED AT LEAST 100 CIGARETTES IN YOUR ENTIRE LIFE: NO/DON'T KNOW
DO YOU EVER COUGH UP ANY MUCUS OR PHLEGM?: NO/ONLY WITH OCCASIONAL COLDS OR INFECTIONS
COPD SCREENING SCORE: 0
DURING THE PAST 4 WEEKS HOW MUCH DID YOU FEEL SHORT OF BREATH: NONE/LITTLE OF THE TIME
IN THE PAST 12 MONTHS DO YOU DO LESS THAN YOU USED TO BECAUSE OF YOUR BREATHING PROBLEMS: DISAGREE/UNSURE

## 2020-08-16 ASSESSMENT — LIFESTYLE VARIABLES
CONSUMPTION TOTAL: INCOMPLETE
EVER FELT BAD OR GUILTY ABOUT YOUR DRINKING: NO
HOW MANY TIMES IN THE PAST YEAR HAVE YOU HAD 5 OR MORE DRINKS IN A DAY: 0
EVER_SMOKED: NEVER
ALCOHOL_USE: NO
HAVE PEOPLE ANNOYED YOU BY CRITICIZING YOUR DRINKING: NO
HAVE YOU EVER FELT YOU SHOULD CUT DOWN ON YOUR DRINKING: NO
ON A TYPICAL DAY WHEN YOU DRINK ALCOHOL HOW MANY DRINKS DO YOU HAVE: 0
DOES PATIENT WANT TO STOP DRINKING: NO
AVERAGE NUMBER OF DAYS PER WEEK YOU HAVE A DRINK CONTAINING ALCOHOL: 0

## 2020-08-16 ASSESSMENT — PAIN SCALES - GENERAL: PAIN_LEVEL: 1

## 2020-08-16 ASSESSMENT — PATIENT HEALTH QUESTIONNAIRE - PHQ9
1. LITTLE INTEREST OR PLEASURE IN DOING THINGS: NOT AT ALL
SUM OF ALL RESPONSES TO PHQ9 QUESTIONS 1 AND 2: 0
2. FEELING DOWN, DEPRESSED, IRRITABLE, OR HOPELESS: NOT AT ALL

## 2020-08-16 ASSESSMENT — FIBROSIS 4 INDEX: FIB4 SCORE: 0.29

## 2020-08-16 NOTE — ANESTHESIA POSTPROCEDURE EVALUATION
Patient: Stefania Renteria    Procedure Summary     Date: 08/16/20 Room / Location: [facility] [place] OR 01 / LABOR AND DELIVERY    Anesthesia Start: 1159 Anesthesia Stop:     Procedure: CERCLAGE, CERVIX (Vagina ) Diagnosis: (Cerclage)    Surgeon: Muna Carballo M.D. Responsible Provider: Last Walsh M.D.    Anesthesia Type: spinal ASA Status: 2          Final Anesthesia Type: spinal  Last vitals  BP   Blood Pressure: 110/61    Temp        Pulse   Pulse: 96   Resp   18    SpO2   99 %      Anesthesia Post Evaluation    Patient location during evaluation: PACU  Patient participation: complete - patient participated  Level of consciousness: awake and alert  Pain score: 1    Airway patency: patent  Anesthetic complications: no  Cardiovascular status: hemodynamically stable  Respiratory status: acceptable  Hydration status: euvolemic    PONV: none

## 2020-08-16 NOTE — DISCHARGE INSTRUCTIONS
Cervical Cerclage, Care After    This sheet gives you information about how to care for yourself after your procedure. Your health care provider may also give you more specific instructions. If you have problems or questions, contact your health care provider.  What can I expect after the procedure?  After your procedure, it is common to have:  · Cramping in your abdomen.  · Mucus discharge for several days.  · Painful urination (dysuria).  · Small drops of blood coming from your vagina (spotting).  Follow these instructions at home:  · Follow instructions from your health care provider about bed rest, if this applies. You may need to be on bed rest for up to 3 days.  · Take over-the-counter and prescription medicines only as told by your health care provider.  · Do not drive or use heavy machinery while taking prescription pain medicine.  · Keep track of your vaginal discharge and watch for any changes. If you notice changes, tell your health care provider.  · Avoid physical activities and exercise until your health care provider approves. Ask your health care provider what activities are safe for you.  · Until your health care provider approves:  ? Do not douche.  ? Do not have sexual intercourse.  · Keep all pre-birth (prenatal) visits and all follow-up visits as told by your health care provider. This is important. You will probably have weekly visits to have your cervix checked, and you may need an ultrasound.  Contact a health care provider if:  · You have abnormal or bad-smelling vaginal discharge, such as clots.  · You develop a rash on your skin. This may look like redness and swelling.  · You become light-headed or feel like you are going to faint.  · You have abdominal pain that does not get better with medicine.  · You have persistent nausea or vomiting.  Get help right away if:  · You have vaginal bleeding that is heavier or more frequent than spotting.  · You are leaking fluid or have a gush of fluid  from your vagina (your water breaks).  · You have a fever or chills.  · You faint.  · You have uterine contractions. These may feel like:  ? A back ache.  ? Lower abdominal pain.  ? Mild cramps, similar to menstrual cramps.  ? Tightening or pressure in your abdomen.  · You think that your baby is not moving as much as usual, or you cannot feel your baby move.  · You have chest pain.  · You have shortness of breath.  This information is not intended to replace advice given to you by your health care provider. Make sure you discuss any questions you have with your health care provider.  Document Released: 10/08/2014 Document Revised: 11/30/2018 Document Reviewed: 07/21/2017  [employer] Patient Education © 2020 [employer]

## 2020-08-16 NOTE — ANESTHESIA TIME REPORT
Anesthesia Start and Stop Event Times     Date Time Event    8/16/2020 1156 Ready for Procedure     1159 Anesthesia Start     1235 Anesthesia Stop        Responsible Staff  08/16/20    Name Role Begin End    Last Walsh M.D. Anesth 1159 1235        Preop Diagnosis (Free Text):  Pre-op Diagnosis     IMCOMPETENT CERVIX        Preop Diagnosis (Codes):    Post op Diagnosis  Cervical cerclage suture present in first trimester      Premium Reason  E. Weekend    Comments: PREMIUM BECKY

## 2020-08-16 NOTE — ANESTHESIA PROCEDURE NOTES
Spinal Block    Date/Time: 8/16/2020 12:02 PM  Performed by: Last Walsh M.D.  Authorized by: Last Walsh M.D.     Patient Location:  [facility]  Start Time:  8/16/2020 12:02 PM  End Time:  8/16/2020 12:04 PM  Reason for Block: primary anesthetic    patient identified, IV checked, site marked, risks and benefits discussed, surgical consent, monitors and equipment checked, pre-op evaluation and timeout performed    Patient Position:  Sitting  Prep: ChloraPrep, patient draped and sterile technique    Monitoring:  Blood pressure, continuous pulse oximetry and heart rate  Approach:  Midline  Location:  L2-3  Injection Technique:  Single-shot  Skin infiltration:  Lidocaine  Strength:  1%  Dose:  3ml  Needle Type:  Dany  Needle Gauge:  25 G  CSF flowing pre/post injection:  Yes  Sensory Level:  T6

## 2020-08-16 NOTE — ANESTHESIA QCDR
2019 [facility] Clinical Data Registry (for Quality Improvement)     Postoperative nausea/vomiting risk protocol (Adult = 18 yrs and Pediatric 3-17 yrs)- (430 and 463)  General inhalation anesthetic (NOT TIVA) with PONV risk factors: Yes  Provision of anti-emetic therapy with at least 2 different classes of agents: Yes   Patient DID NOT receive anti-emetic therapy and reason is documented in Medical Record:  N/A    Multimodal Pain Management- (477)  Non-emergent surgery AND patient age >= 18:   Use of Multimodal Pain Management, two or more drugs and/or interventions, NOT including systemic opioids:   Exception: Documented allergy to multiple classes of analgesics:     Smoking Abstinence (404)  Patient is current smoker (cigarette, pipe, e-cig, marijuanna):   Elective Surgery:   Abstinence instructions provided prior to day of surgery:   Patient abstained from smoking on day of surgery:     Pre-Op Beta-Blocker in Isolated CABG (44)  Isolated CABG AND patient age >= 18:   Beta-blocker admin within 24 hours of surgical incision:   Exception:of medical reason(s) for not administering beta blocker within 24 hours prior to surgical incision (e.g., not  indicated,other medical reason):     PACU assessment of acute postoperative pain prior to Anesthesia Care End- Applies to Patients Age = 18- (ABG7)  Initial PACU pain score is which of the following: < 7/10  Patient unable to report pain score: N/A    Post-anesthetic transfer of care checklist/protocol to PACU/ICU- (426 and 427)  Upon conclusion of case, patient transferred to which of the following locations: PACU/Non-ICU  Use of transfer checklist/protocol:   Exclusion: Service Performed in Patient Hospital Room (and thus did not require transfer):   Unplanned admission to ICU related to anesthesia service up through end of PACU care- (MD51)  Unplanned admission to ICU (not initially anticipated at anesthesia start time): No

## 2020-08-16 NOTE — H&P
Maternal Fetal Medicine H&P    ID: 35 y.o.  with IUP at 12w4d on date of admission    HPI: Stefania Renteria is a 35 y.o.  at 12w4d presenting for history-indicated cerclage placement. The patient has a history of 15 week PPROM in her G2 pregnancy and when she presented the fetal foot was coming through the vagina suggesting that she may have had early cervical insufficiency leading to PPROM. After counseling, the patient desires history-indicated cerclage placement this pregnancy.      ROS: 10 systems reviewed and negative except as noted in HPI     course:   1. History of 15wk PPROM in G2 pregnancy  2. IVF pregnancy - normal PGT-A  3. T2DM  4. Obesity    OBSTETRIC HISTORY:  SAB, D&C  G2 15 week PPROM, IOL    PAST MEDICAL HISTORY: T2DM, obesity, vitamin D deficiency    PAST SURGICAL HISTORY: D&C, cholecystectomy, uterine polypectomy    SOCIAL HISTORY: Neg x 3    FAMILY HISTORY: Noncontributory to current issue    Medications:   No current facility-administered medications on file prior to encounter.      Current Outpatient Medications on File Prior to Encounter   Medication Sig Dispense Refill   • glucose blood (ONE TOUCH ULTRA TEST) strip 1 Strip by Other route every day. 100 Strip 11   • VITAMIN D, CHOLECALCIFEROL, PO Take  by mouth.     • ciclopirox (PENLAC) 8 % solution Apply 1 Application to affected area(s).         Allergies:  Patient has no known allergies.        O:   Vitals:    20 1021   BP:    Pulse:    Resp: 18   SpO2: 98%     Gen: NAD, AAO  CV: RRR  Resp: CTAB  Abd: NTTP, No rebound or guarding  Ext: No edema, 2+DPP  Pelvic: SVE deferred      A/P: 35 y.o.  at 12w4d presenting for history-indicated cerclage placement    -patient counseled regarding procedure extensively and wishes to proceed. R/B/A reviewed with patient  -discharge home after procedure once able to ambulate and void  -patient to follow-up in my office in 2-3 weeks    Muna Carballo MD

## 2020-08-16 NOTE — OP REPORT
DATE OF SERVICE:  2020     PREOPERATIVE DIAGNOSES:  1.  Intrauterine pregnancy at 12w4d  2.  History of 15 week PPROM     POSTOPERATIVE DIAGNOSES:  1.  Intrauterine pregnancy at 12w4d  2.  same    PROCEDURE PERFORMED:  Dobbins Cerclage     SURGEON:  Muna Carballo MD     ASSISTANT: None     ANESTHESIA:  Spinal.     ANESTHESIOLOGIST:  Last Walsh MD     SPECIMEN:  None     ESTIMATED BLOOD LOSS:  Minimal     FINDINGS:  12 week size uterus with confirmed live intrauterine pregnancy prior to the procedure. Cervical exam closed, 2cm palpable length, firm, posterior prior to the procedure.     COMPLICATIONS:  None.    INDICATIONS FOR THE PROCEDURE: 35 y.o.  at 12w4d presenting for history-indicated cerclage placement. The patient has a history of 15 week PPROM in her G2 pregnancy and when she presented the fetal foot was coming through the vagina suggesting that she may have had early cervical insufficiency leading to PPROM. After counseling, the patient desires history-indicated cerclage placement this pregnancy.       PROCEDURE:  After appropriate consents were obtained, the patient was taken to the operating room where spinal anesthesia was applied without complications.  The patient was then prepped and draped in the usual sterile manner in the dorsal lithotomy position. A time-out was performed to confirm the correct patient and correct procedure. A bimanual exam was performed with a above findings. A straight catheter was inserted into the bladder and 20 mL of clear yellow urine was obtained. A speculum was inserted into the vagina and the cervix was visualized. The anterior and posterior lips of the cervix were visualized and grasped with ring forceps. 5mm Mersilene tape was used to take a bite in the body of the cervix at the 12 o'clock position as close as possible to the junction of the rugated vaginal epithelium exiting at the 10 o'clock position. Successive bites were taken from the 10 to 8  o'clock position, 8-6 o'clock position, 6-4 o'clock position, 4-2 o'clock position, and 2-12 o'clock position. The purse-string suture was tightened and tied 10 times. The knot was noted to be at the 12 o'clock position.     The speculum was removed from the vagina and a bimanual exam was performed. The cervix was noted to be closed with 2 cm of cervix palpable distal to the cerclage and the cerclage knot palpable at the 12 o'clock position. All needle, sponge, and instrument counts were noted to be correct x 2 at the completion of the procedure. The patient tolerated the procedure well and was transferred to the recovery room in stable condition.           ____________________________________     Muna Carballo MD

## 2020-08-17 NOTE — PROGRESS NOTES
1020. Pt here for cervical cerclage.     1100. Dr. Carballo at the bedside. Ultrasound heart tones 165.    1235. Dr. Carballo at the bedside. Ultrasound heart tones 157.    1700. Pt up to void. DC instructions given. Pt understands and has no questions at this time.

## 2020-09-02 ENCOUNTER — TELEPHONE (OUTPATIENT)
Dept: CARDIOLOGY | Facility: MEDICAL CENTER | Age: 35
End: 2020-09-02

## 2020-09-02 NOTE — TELEPHONE ENCOUNTER
Called patient to see if she has followed with a cardiologist in the past to get records prior to new patient appt with [facility]. Unable to reach patient, left voicemail for call back.

## 2020-09-10 ENCOUNTER — OFFICE VISIT (OUTPATIENT)
Dept: CARDIOLOGY | Facility: MEDICAL CENTER | Age: 35
End: 2020-09-10
Payer: COMMERCIAL

## 2020-09-10 VITALS
BODY MASS INDEX: 34.6 KG/M2 | HEIGHT: 62 IN | RESPIRATION RATE: 16 BRPM | HEART RATE: 78 BPM | DIASTOLIC BLOOD PRESSURE: 76 MMHG | OXYGEN SATURATION: 99 % | SYSTOLIC BLOOD PRESSURE: 118 MMHG | WEIGHT: 188 LBS

## 2020-09-10 DIAGNOSIS — R06.02 SOB (SHORTNESS OF BREATH): ICD-10-CM

## 2020-09-10 DIAGNOSIS — E78.5 DYSLIPIDEMIA: ICD-10-CM

## 2020-09-10 DIAGNOSIS — Z3A.16 16 WEEKS GESTATION OF PREGNANCY: ICD-10-CM

## 2020-09-10 DIAGNOSIS — Z79.4 TYPE 2 DIABETES MELLITUS WITH HYPOGLYCEMIA WITHOUT COMA, WITH LONG-TERM CURRENT USE OF INSULIN (HCC): ICD-10-CM

## 2020-09-10 DIAGNOSIS — E11.649 TYPE 2 DIABETES MELLITUS WITH HYPOGLYCEMIA WITHOUT COMA, WITH LONG-TERM CURRENT USE OF INSULIN (HCC): ICD-10-CM

## 2020-09-10 LAB — EKG IMPRESSION: NORMAL

## 2020-09-10 PROCEDURE — 99203 OFFICE O/P NEW LOW 30 MIN: CPT | Performed by: INTERNAL MEDICINE

## 2020-09-10 PROCEDURE — 93000 ELECTROCARDIOGRAM COMPLETE: CPT | Performed by: INTERNAL MEDICINE

## 2020-09-10 RX ORDER — INSULIN GLARGINE 100 [IU]/ML
28 INJECTION, SOLUTION SUBCUTANEOUS
COMMUNITY
Start: 2019-11-01 | End: 2021-02-03

## 2020-09-10 RX ORDER — INSULIN LISPRO 100 [IU]/ML
18-22 INJECTION, SOLUTION INTRAVENOUS; SUBCUTANEOUS
Status: ON HOLD | COMMUNITY
Start: 2019-11-01 | End: 2021-02-15

## 2020-09-10 ASSESSMENT — FIBROSIS 4 INDEX: FIB4 SCORE: 0.4

## 2020-09-10 NOTE — PROGRESS NOTES
CARDIOLOGY NEW PATIENT CONSULTATION    PCP: Tatum Nye M.D.    1. Type 2 diabetes mellitus with hypoglycemia without coma, with long-term current use of insulin (HCC)  Diagnosed at the age of 30.  On metformin prepregnancy, currently using insulin.  We discussed long-term cardiovascular risk modification with both lifestyle and pharmacologic measures.  I informed her that these efforts would have to take into consideration the ongoing family-planning situation.  In general statin therapy is indicated beyond the age of 40 and all diabetic patients.furthermore, I would strongly consider SGLT inhibitors a second line agents-particularly if nephropathy or overt cardiovascular disease develops.  Lastly GLP-1 inhibitors could be useful as well.    2. 16 weeks gestation of pregnancy  .  First miscarriage in early term, second related to PPROM/cervical insufficiency.  Current pregnancy with cerclage in place and progressing well.  Possible increased long-term cardiovascular risk due to pregnancy complications-though the correlation with PPROM/cervical insufficiency is unclear    Follow up with myself as needed  -She may desire to rehash our discussion in several years once family planning is complete    Chief Complaint   Patient presents with   • Dyslipidemia       History: Stefania Renteria is a 35 y.o. female with a past medical history of dyslipidemia and miscarriages presenting for consultation regarding diabetes and cardiovascular risk.  She was diagnosed at the age of 30 with diabetes.  Since that time she has made lifestyle changes including eating healthier and jogging 30 minutes daily-which she can do without physical limitation or cardiac symptoms.  With these efforts and the use of metformin the most recent A1c was 5.5.  She has been struggling with infertility over the past several years and suffered 2 previous miscarriages.  She is currently 16 weeks pregnant and had a cerclage placed about a month  "ago.  Thus far this pregnancy is progressing nicely.  She has switched to insulin and is no longer exercising in an effort to minimize the risk of miscarriage.  She does have a very strong family history of diabetes on her father's side.  There is no early history of premature coronary disease.  Lipid profiles have been measured and within the appropriate range.    ROS:  All other systems reviewed and negative except as per the HPI    PE:  /76 (BP Location: Left arm, Patient Position: Sitting, BP Cuff Size: Adult)   Pulse 78   Resp 16   Ht 1.575 m (5' 2\")   Wt 85.3 kg (188 lb)   SpO2 99%   BMI 34.39 kg/m²   Gen: Well appearing  HEENT: Symmetric face. Anicteric sclerae. Moist mucus membranes  NECK: No JVD. No lymphadenopathy  CARDIAC: Normal PMI, regular, normal S1, S2. without murmur  VASCULATURE: Normal carotid amplitude without bruit.   RESP: Clear to auscultation bilaterally  ABD: Soft, non-tender, non-distended  EXT: none, no clubbing or cyanosis  SKIN: Warm and dry  NEURO: No gross deficits  PSYCH: Appropriate affect, participates in conversation    Past Medical History:   Diagnosis Date   • Diabetes (HCC)    • Obesity    • PCOS (polycystic ovarian syndrome)      Past Surgical History:   Procedure Laterality Date   • CERVICAL CERCLAGE  8/16/2020    Procedure: CERCLAGE, CERVIX;  Surgeon: Muna Carballo M.D.;  Location: LABOR AND DELIVERY;  Service: Obstetrics   • CHOLECYSTECTOMY       No Known Allergies  Outpatient Encounter Medications as of 9/10/2020   Medication Sig Dispense Refill   • insulin glargine (INSULIN GLARGINE) 100 UNIT/ML Solution Pen-injector injection      • insulin lispro (HUMALOG KWIKPEN) 100 UNIT/ML Solution Pen-injector injection PEN      • aspirin EC (ECOTRIN) 81 MG Tablet Delayed Response Take 81 mg by mouth every day.     • 5-Methyltetrahydrofolate (METHYL FOLATE) Powder by Does not apply route.     • Prenatal Vit-Fe Fumarate-FA (PRENATAL VITAMINS PO) Take  by mouth.     • " Continuous Blood Gluc  (FREESTYLE DIEGO READER) Device 1 Device by Does not apply route 6 Times a Day. Please give 14 day sensor and reader 1 Device 1   • Continuous Blood Gluc Sensor (FREESTYLE DIEGO SENSOR SYSTEM) Misc 1 Applicator by Does not apply route every 14 days. Please give 14 day sensor and reader 2 Each 11   • metformin (GLUCOPHAGE) 1000 MG tablet TAKE 1 TABLET BY MOUTH TWICE A  Tab 1   • glucose blood (ONE TOUCH ULTRA TEST) strip 1 Strip by Other route every day. 100 Strip 11   • VITAMIN D, CHOLECALCIFEROL, PO Take  by mouth.     • [DISCONTINUED] insulin glargine (LANTUS SOLOSTAR) 100 UNIT/ML Solution Pen-injector injection Inject 30 Units as instructed every evening. 5 PEN 3   • [DISCONTINUED] insulin lispro (HUMALOG KWIKPEN) 100 UNIT/ML Solution Pen-injector injection PEN Inject 5 Units as instructed 3 times a day before meals. 5 PEN 3   • [DISCONTINUED] Continuous Blood Gluc  (DEXCOM G6 ) Device 1 Applicator by Does not apply route Continuous. 1 Device 1   • [DISCONTINUED] Continuous Blood Gluc Sensor (DEXCOM G6 SENSOR) Misc 1 Applicator by Does not apply route every 10 days. 9 Each 3   • [DISCONTINUED] Continuous Blood Gluc Transmit (DEXCOM G6 TRANSMITTER) Misc 1 Applicator by Does not apply route Every 3 Months. 2 Each 2   • [DISCONTINUED] ciclopirox (PENLAC) 8 % solution Apply 1 Application to affected area(s).       No facility-administered encounter medications on file as of 9/10/2020.      Social History     Socioeconomic History   • Marital status:      Spouse name: Not on file   • Number of children: Not on file   • Years of education: Not on file   • Highest education level: Not on file   Occupational History   • Not on file   Social Needs   • Financial resource strain: Not on file   • Food insecurity     Worry: Not on file     Inability: Not on file   • Transportation needs     Medical: Not on file     Non-medical: Not on file   Tobacco Use   • Smoking  status: Never Smoker   • Smokeless tobacco: Never Used   Substance and Sexual Activity   • Alcohol use: No     Comment: Rarely   • Drug use: No   • Sexual activity: Yes   Lifestyle   • Physical activity     Days per week: Not on file     Minutes per session: Not on file   • Stress: Not on file   Relationships   • Social connections     Talks on phone: Not on file     Gets together: Not on file     Attends Latter day service: Not on file     Active member of club or organization: Not on file     Attends meetings of clubs or organizations: Not on file     Relationship status: Not on file   • Intimate partner violence     Fear of current or ex partner: Not on file     Emotionally abused: Not on file     Physically abused: Not on file     Forced sexual activity: Not on file   Other Topics Concern   • Not on file   Social History Narrative   • Not on file         Family History   Problem Relation Age of Onset   • Hypertension Mother    • Diabetes Father    • Hyperlipidemia Father    • No Known Problems Sister    • Diabetes Brother          Studies  Lab Results   Component Value Date/Time    CHOLSTRLTOT 185 03/26/2020 07:33 AM     (H) 03/26/2020 07:33 AM    HDL 54 03/26/2020 07:33 AM    TRIGLYCERIDE 121 03/26/2020 07:33 AM       Lab Results   Component Value Date/Time    SODIUM 139 03/26/2020 07:33 AM    POTASSIUM 4.0 03/26/2020 07:33 AM    CHLORIDE 105 03/26/2020 07:33 AM    CO2 21 03/26/2020 07:33 AM    GLUCOSE 89 03/26/2020 07:33 AM    BUN 13 03/26/2020 07:33 AM    CREATININE 0.53 03/26/2020 07:33 AM    BUNCREATRAT 14 10/12/2019 05:39 AM     Lab Results   Component Value Date/Time    ALKPHOSPHAT 70 03/26/2020 07:33 AM    ASTSGOT 12 03/26/2020 07:33 AM    ALTSGPT 17 03/26/2020 07:33 AM    TBILIRUBIN 0.4 03/26/2020 07:33 AM        For this encounter I directly reviewed ECG tracings and medical records I agree with the interpretations in the electronic health record

## 2020-10-05 DIAGNOSIS — E11.9 CONTROLLED TYPE 2 DIABETES MELLITUS WITHOUT COMPLICATION, WITHOUT LONG-TERM CURRENT USE OF INSULIN (HCC): ICD-10-CM

## 2021-01-28 LAB — STREP GP B DNA PCR: NEGATIVE

## 2021-02-03 ENCOUNTER — PRE-ADMISSION TESTING (OUTPATIENT)
Dept: ADMISSIONS | Facility: MEDICAL CENTER | Age: 36
End: 2021-02-03
Attending: OBSTETRICS & GYNECOLOGY
Payer: COMMERCIAL

## 2021-02-03 RX ORDER — INSULIN GLARGINE 300 U/ML
28 INJECTION, SOLUTION SUBCUTANEOUS
Status: ON HOLD | COMMUNITY
End: 2021-02-15

## 2021-02-03 RX ORDER — LEVOMEFOLATE CALCIUM 15 MG
1 TABLET ORAL EVERY MORNING
Status: ON HOLD | COMMUNITY
End: 2021-02-15

## 2021-02-03 RX ORDER — CHOLECALCIFEROL (VITAMIN D3) 125 MCG
1 CAPSULE ORAL EVERY MORNING
Status: ON HOLD | COMMUNITY
End: 2021-02-15

## 2021-02-05 NOTE — H&P
"DATE OF ADMISSION:  2021     DATE OF SURGERY:  2021     IDENTIFICATION DATA:  This is a 35-year-old  3, para 0-0-2-0, with an   EDC of 2021 and EGA of 38 and 3/7 weeks, who presents with a chief   complaint of \"my baby is breech.\"     HISTORY OF PRESENT ILLNESS:  This is a patient of mine who has gotten good   prenatal care.  She has seen both by myself and Dr. [facility]il [facility].  She is of advanced maternal age.  This is an IVF pregnancy.    She has a history of previous cervical incompetence with 15-week loss.  She   had a cerclage with this pregnancy that was recently removed.  She is also a   type 2 diabetic, currently taking metformin 1000 mg b.i.d., Humalog 18 before   breakfast, 22 before lunch and 20 before dinner and Toujeo 28 at bedtime.  She   has been euglycemic with this pregnancy.  She has had normal twice weekly   testing.  She has normal genetics with this pregnancy as well.  Her baby has   been in persistent breech presentation.  Perinatology recommends delivery at   this time and subsequently she presents today for a primary low transverse   .  She does have a velamentous cord insertion.  She is known beta   Strep negative.  She has no other complaints, nausea, vomiting, fever, chills,   change in bowel or bladder habits.  Admits to good fetal movement.  Denies   symptoms of PIH, headaches, visual changes, epigastric pain.  Fetal heart   tracings anticipated reactive, category 1.  She is not salina and she is   quite excited to move forward with surgery at this time after careful   counseling.     OBSTETRICAL HISTORY:  Significant for previous 15-week loss with incompetent   cervix of IVF pregnancy as well as previous spontaneous .     GYNECOLOGIC HISTORY:  No STDs or abnormal Paps.  Most recent Pap is normal.     MEDICAL HISTORY:  ALLERGIES:  None.     CURRENT MEDICATIONS:  Insulin regime as above.     MEDICAL PROBLEMS:  Type " 2 diabetes.     SURGICAL HISTORY:  Laparoscopic cholecystectomy, hysteroscopy, polypectomy, D   and C, cerclage and cerclage removal.     SOCIAL HISTORY:  Denies alcohol, tobacco or drug use.     FAMILY HISTORY:  Noncontributory.     REVIEW OF SYSTEMS:  Times 12 is negative per AMA standards available in chart.     LABORATORY DATA:  She is Rh positive, rubella immune, negative beta Strep,   showed normal genetic testing.     PHYSICAL EXAMINATION:  VITAL SIGNS:  The patient is afebrile.  Vital signs within normal limits.    Fetal heart tracings anticipated reactive, category 1.  She is not   sailna.  GENERAL:  She is awake, alert, in no apparent distress.  NECK:  Supple.  HEART:  Regular.  CHEST:  Clear.  BREASTS:  Symmetrical.  ABDOMEN:  Soft, pannus gravid, size appropriate for dates.  EXTREMITIES:  No cyanosis, clubbing, trace edema, 2+ DTRs.  GYNECOLOGIC: EGBUS within normal limits.  No perineal lesions.  Vagina is pink   and moist.  Cervix is midline, long, thick and closed.  Uterus midline,   anteverted.  No adnexal masses.     ASSESSMENT:  At this time,  1.  Pregnancy at 38 and 3/7 weeks.  2.  Advanced maternal age with normal genetic testing.  3.  IVF pregnancy.  4.  Incompetent cervix, status post cerclage removal.  5.  Type 2 diabetes with good blood sugar control.  6.  Velamentous cord insertion.  7.  Beta Strep negative.  8.  Breech presentation, desires  section.     PLAN:  At this time, the patient has been extensively counseled on risks,   benefits, complications and alternatives to surgery, which include, but are   not limited to risk of anesthesia, risk of injury to bowel, bladder, ureters,   major vessels, nerves, and pelvis, risk of blood clots in her legs and lungs   and subsequent postop pneumonia.  She accepts these risks and plan   subsequently is a primary low transverse .        ______________________________  MD TABITHA Lockwood/FLORES/ANNE    DD:  2021  13:20  DT:  02/04/2021 15:11    Job#:  [id number]

## 2021-02-09 ENCOUNTER — HOSPITAL ENCOUNTER (OUTPATIENT)
Dept: OBGYN | Facility: MEDICAL CENTER | Age: 36
End: 2021-02-09
Attending: OBSTETRICS & GYNECOLOGY
Payer: COMMERCIAL

## 2021-02-09 PROCEDURE — U0003 INFECTIOUS AGENT DETECTION BY NUCLEIC ACID (DNA OR RNA); SEVERE ACUTE RESPIRATORY SYNDROME CORONAVIRUS 2 (SARS-COV-2) (CORONAVIRUS DISEASE [COVID-19]), AMPLIFIED PROBE TECHNIQUE, MAKING USE OF HIGH THROUGHPUT TECHNOLOGIES AS DESCRIBED BY CMS-2020-01-R: HCPCS

## 2021-02-09 PROCEDURE — U0005 INFEC AGEN DETEC AMPLI PROBE: HCPCS

## 2021-02-09 NOTE — PROGRESS NOTES
1120 Pt here for Covid testing. Tolerated well. Pt instructed to self isolate until procedure on Friday, pt verbalizes understanding.

## 2021-02-10 LAB
SARS-COV-2 RNA RESP QL NAA+PROBE: NOTDETECTED
SPECIMEN SOURCE: NORMAL

## 2021-02-11 ENCOUNTER — ANESTHESIA (OUTPATIENT)
Dept: OBGYN | Facility: MEDICAL CENTER | Age: 36
End: 2021-02-11
Payer: COMMERCIAL

## 2021-02-11 ENCOUNTER — ANESTHESIA EVENT (OUTPATIENT)
Dept: OBGYN | Facility: MEDICAL CENTER | Age: 36
End: 2021-02-11
Payer: COMMERCIAL

## 2021-02-11 ENCOUNTER — HOSPITAL ENCOUNTER (INPATIENT)
Facility: MEDICAL CENTER | Age: 36
LOS: 4 days | End: 2021-02-15
Attending: OBSTETRICS & GYNECOLOGY | Admitting: OBSTETRICS & GYNECOLOGY
Payer: COMMERCIAL

## 2021-02-11 DIAGNOSIS — G89.18 POSTOPERATIVE PAIN: ICD-10-CM

## 2021-02-11 LAB
BASOPHILS # BLD AUTO: 0.4 % (ref 0–1.8)
BASOPHILS # BLD: 0.04 K/UL (ref 0–0.12)
EOSINOPHIL # BLD AUTO: 0.1 K/UL (ref 0–0.51)
EOSINOPHIL NFR BLD: 1 % (ref 0–6.9)
ERYTHROCYTE [DISTWIDTH] IN BLOOD BY AUTOMATED COUNT: 53.9 FL (ref 35.9–50)
GLUCOSE BLD-MCNC: 83 MG/DL (ref 65–99)
HCT VFR BLD AUTO: 41.5 % (ref 37–47)
HGB BLD-MCNC: 13.1 G/DL (ref 12–16)
HOLDING TUBE BB 8507: NORMAL
IMM GRANULOCYTES # BLD AUTO: 0.12 K/UL (ref 0–0.11)
IMM GRANULOCYTES NFR BLD AUTO: 1.1 % (ref 0–0.9)
LYMPHOCYTES # BLD AUTO: 2.48 K/UL (ref 1–4.8)
LYMPHOCYTES NFR BLD: 23.7 % (ref 22–41)
MCH RBC QN AUTO: 25.7 PG (ref 27–33)
MCHC RBC AUTO-ENTMCNC: 31.6 G/DL (ref 33.6–35)
MCV RBC AUTO: 81.4 FL (ref 81.4–97.8)
MONOCYTES # BLD AUTO: 0.73 K/UL (ref 0–0.85)
MONOCYTES NFR BLD AUTO: 7 % (ref 0–13.4)
NEUTROPHILS # BLD AUTO: 7 K/UL (ref 2–7.15)
NEUTROPHILS NFR BLD: 66.8 % (ref 44–72)
NRBC # BLD AUTO: 0 K/UL
NRBC BLD-RTO: 0 /100 WBC
PLATELET # BLD AUTO: 281 K/UL (ref 164–446)
PMV BLD AUTO: 11.6 FL (ref 9–12.9)
RBC # BLD AUTO: 5.1 M/UL (ref 4.2–5.4)
WBC # BLD AUTO: 10.5 K/UL (ref 4.8–10.8)

## 2021-02-11 PROCEDURE — 160041 HCHG SURGERY MINUTES - EA ADDL 1 MIN LEVEL 4: Performed by: OBSTETRICS & GYNECOLOGY

## 2021-02-11 PROCEDURE — 85025 COMPLETE CBC W/AUTO DIFF WBC: CPT

## 2021-02-11 PROCEDURE — 82962 GLUCOSE BLOOD TEST: CPT

## 2021-02-11 PROCEDURE — 36415 COLL VENOUS BLD VENIPUNCTURE: CPT

## 2021-02-11 PROCEDURE — 700111 HCHG RX REV CODE 636 W/ 250 OVERRIDE (IP)

## 2021-02-11 PROCEDURE — 160029 HCHG SURGERY MINUTES - 1ST 30 MINS LEVEL 4: Performed by: OBSTETRICS & GYNECOLOGY

## 2021-02-11 PROCEDURE — 770002 HCHG ROOM/CARE - OB PRIVATE (112)

## 2021-02-11 PROCEDURE — 160002 HCHG RECOVERY MINUTES (STAT): Performed by: OBSTETRICS & GYNECOLOGY

## 2021-02-11 PROCEDURE — 700111 HCHG RX REV CODE 636 W/ 250 OVERRIDE (IP): Performed by: ANESTHESIOLOGY

## 2021-02-11 PROCEDURE — 160009 HCHG ANES TIME/MIN: Performed by: OBSTETRICS & GYNECOLOGY

## 2021-02-11 PROCEDURE — 700101 HCHG RX REV CODE 250: Performed by: ANESTHESIOLOGY

## 2021-02-11 PROCEDURE — 160035 HCHG PACU - 1ST 60 MINS PHASE I: Performed by: OBSTETRICS & GYNECOLOGY

## 2021-02-11 PROCEDURE — 302449 STATCHG TRIAGE ONLY (STATISTIC)

## 2021-02-11 PROCEDURE — 700105 HCHG RX REV CODE 258: Performed by: ANESTHESIOLOGY

## 2021-02-11 PROCEDURE — 700102 HCHG RX REV CODE 250 W/ 637 OVERRIDE(OP): Performed by: ANESTHESIOLOGY

## 2021-02-11 PROCEDURE — 160048 HCHG OR STATISTICAL LEVEL 1-5: Performed by: OBSTETRICS & GYNECOLOGY

## 2021-02-11 PROCEDURE — A9270 NON-COVERED ITEM OR SERVICE: HCPCS | Performed by: ANESTHESIOLOGY

## 2021-02-11 RX ORDER — KETOROLAC TROMETHAMINE 30 MG/ML
INJECTION, SOLUTION INTRAMUSCULAR; INTRAVENOUS PRN
Status: DISCONTINUED | OUTPATIENT
Start: 2021-02-11 | End: 2021-02-11 | Stop reason: SURG

## 2021-02-11 RX ORDER — OXYTOCIN 10 [USP'U]/ML
INJECTION, SOLUTION INTRAMUSCULAR; INTRAVENOUS PRN
Status: DISCONTINUED | OUTPATIENT
Start: 2021-02-11 | End: 2021-02-11 | Stop reason: SURG

## 2021-02-11 RX ORDER — MEPERIDINE HYDROCHLORIDE 25 MG/ML
6.25 INJECTION INTRAMUSCULAR; INTRAVENOUS; SUBCUTANEOUS
Status: DISCONTINUED | OUTPATIENT
Start: 2021-02-11 | End: 2021-02-11 | Stop reason: HOSPADM

## 2021-02-11 RX ORDER — DIPHENHYDRAMINE HYDROCHLORIDE 50 MG/ML
12.5 INJECTION INTRAMUSCULAR; INTRAVENOUS EVERY 6 HOURS PRN
Status: ACTIVE | OUTPATIENT
Start: 2021-02-11 | End: 2021-02-12

## 2021-02-11 RX ORDER — SODIUM CHLORIDE, SODIUM GLUCONATE, SODIUM ACETATE, POTASSIUM CHLORIDE AND MAGNESIUM CHLORIDE 526; 502; 368; 37; 30 MG/100ML; MG/100ML; MG/100ML; MG/100ML; MG/100ML
INJECTION, SOLUTION INTRAVENOUS
Status: DISCONTINUED | OUTPATIENT
Start: 2021-02-11 | End: 2021-02-11 | Stop reason: SURG

## 2021-02-11 RX ORDER — ONDANSETRON 2 MG/ML
4 INJECTION INTRAMUSCULAR; INTRAVENOUS
Status: DISCONTINUED | OUTPATIENT
Start: 2021-02-11 | End: 2021-02-11 | Stop reason: HOSPADM

## 2021-02-11 RX ORDER — HYDROMORPHONE HYDROCHLORIDE 1 MG/ML
0.2 INJECTION, SOLUTION INTRAMUSCULAR; INTRAVENOUS; SUBCUTANEOUS
Status: DISCONTINUED | OUTPATIENT
Start: 2021-02-11 | End: 2021-02-11 | Stop reason: HOSPADM

## 2021-02-11 RX ORDER — BUPIVACAINE HYDROCHLORIDE 7.5 MG/ML
INJECTION, SOLUTION INTRASPINAL
Status: COMPLETED | OUTPATIENT
Start: 2021-02-11 | End: 2021-02-11

## 2021-02-11 RX ORDER — ACETAMINOPHEN 500 MG
1000 TABLET ORAL EVERY 6 HOURS
Status: COMPLETED | OUTPATIENT
Start: 2021-02-11 | End: 2021-02-12

## 2021-02-11 RX ORDER — HYDRALAZINE HYDROCHLORIDE 20 MG/ML
5 INJECTION INTRAMUSCULAR; INTRAVENOUS
Status: DISCONTINUED | OUTPATIENT
Start: 2021-02-11 | End: 2021-02-11 | Stop reason: HOSPADM

## 2021-02-11 RX ORDER — SIMETHICONE 80 MG
80 TABLET,CHEWABLE ORAL 4 TIMES DAILY PRN
Status: DISCONTINUED | OUTPATIENT
Start: 2021-02-11 | End: 2021-02-15 | Stop reason: HOSPADM

## 2021-02-11 RX ORDER — METOCLOPRAMIDE HYDROCHLORIDE 5 MG/ML
10 INJECTION INTRAMUSCULAR; INTRAVENOUS ONCE
Status: COMPLETED | OUTPATIENT
Start: 2021-02-11 | End: 2021-02-11

## 2021-02-11 RX ORDER — OXYCODONE HYDROCHLORIDE 5 MG/1
5 TABLET ORAL EVERY 4 HOURS PRN
Status: DISPENSED | OUTPATIENT
Start: 2021-02-11 | End: 2021-02-12

## 2021-02-11 RX ORDER — SODIUM CHLORIDE, SODIUM GLUCONATE, SODIUM ACETATE, POTASSIUM CHLORIDE AND MAGNESIUM CHLORIDE 526; 502; 368; 37; 30 MG/100ML; MG/100ML; MG/100ML; MG/100ML; MG/100ML
500 INJECTION, SOLUTION INTRAVENOUS CONTINUOUS
Status: DISCONTINUED | OUTPATIENT
Start: 2021-02-11 | End: 2021-02-15 | Stop reason: HOSPADM

## 2021-02-11 RX ORDER — ONDANSETRON 2 MG/ML
INJECTION INTRAMUSCULAR; INTRAVENOUS PRN
Status: DISCONTINUED | OUTPATIENT
Start: 2021-02-11 | End: 2021-02-11 | Stop reason: SURG

## 2021-02-11 RX ORDER — KETOROLAC TROMETHAMINE 30 MG/ML
30 INJECTION, SOLUTION INTRAMUSCULAR; INTRAVENOUS EVERY 6 HOURS
Status: DISCONTINUED | OUTPATIENT
Start: 2021-02-12 | End: 2021-02-12

## 2021-02-11 RX ORDER — HYDROMORPHONE HYDROCHLORIDE 1 MG/ML
0.2 INJECTION, SOLUTION INTRAMUSCULAR; INTRAVENOUS; SUBCUTANEOUS
Status: ACTIVE | OUTPATIENT
Start: 2021-02-11 | End: 2021-02-12

## 2021-02-11 RX ORDER — SODIUM CHLORIDE, SODIUM GLUCONATE, SODIUM ACETATE, POTASSIUM CHLORIDE AND MAGNESIUM CHLORIDE 526; 502; 368; 37; 30 MG/100ML; MG/100ML; MG/100ML; MG/100ML; MG/100ML
1500 INJECTION, SOLUTION INTRAVENOUS ONCE
Status: COMPLETED | OUTPATIENT
Start: 2021-02-11 | End: 2021-02-11

## 2021-02-11 RX ORDER — ONDANSETRON 2 MG/ML
4 INJECTION INTRAMUSCULAR; INTRAVENOUS EVERY 6 HOURS PRN
Status: ACTIVE | OUTPATIENT
Start: 2021-02-11 | End: 2021-02-12

## 2021-02-11 RX ORDER — LABETALOL HYDROCHLORIDE 5 MG/ML
5 INJECTION, SOLUTION INTRAVENOUS
Status: DISCONTINUED | OUTPATIENT
Start: 2021-02-11 | End: 2021-02-11 | Stop reason: HOSPADM

## 2021-02-11 RX ORDER — CARBOPROST TROMETHAMINE 250 UG/ML
250 INJECTION, SOLUTION INTRAMUSCULAR
Status: DISCONTINUED | OUTPATIENT
Start: 2021-02-11 | End: 2021-02-15 | Stop reason: HOSPADM

## 2021-02-11 RX ORDER — HYDROMORPHONE HYDROCHLORIDE 1 MG/ML
0.4 INJECTION, SOLUTION INTRAMUSCULAR; INTRAVENOUS; SUBCUTANEOUS
Status: DISCONTINUED | OUTPATIENT
Start: 2021-02-11 | End: 2021-02-11 | Stop reason: HOSPADM

## 2021-02-11 RX ORDER — MORPHINE SULFATE 0.5 MG/ML
INJECTION, SOLUTION EPIDURAL; INTRATHECAL; INTRAVENOUS
Status: COMPLETED | OUTPATIENT
Start: 2021-02-11 | End: 2021-02-11

## 2021-02-11 RX ORDER — OXYCODONE HCL 5 MG/5 ML
10 SOLUTION, ORAL ORAL
Status: DISCONTINUED | OUTPATIENT
Start: 2021-02-11 | End: 2021-02-11 | Stop reason: HOSPADM

## 2021-02-11 RX ORDER — PHENYLEPHRINE HYDROCHLORIDE 10 MG/ML
INJECTION, SOLUTION INTRAMUSCULAR; INTRAVENOUS; SUBCUTANEOUS PRN
Status: DISCONTINUED | OUTPATIENT
Start: 2021-02-11 | End: 2021-02-11 | Stop reason: SURG

## 2021-02-11 RX ORDER — METHYLERGONOVINE MALEATE 0.2 MG/ML
0.2 INJECTION INTRAVENOUS
Status: DISCONTINUED | OUTPATIENT
Start: 2021-02-11 | End: 2021-02-15 | Stop reason: HOSPADM

## 2021-02-11 RX ORDER — HYDROMORPHONE HYDROCHLORIDE 1 MG/ML
0.1 INJECTION, SOLUTION INTRAMUSCULAR; INTRAVENOUS; SUBCUTANEOUS
Status: DISCONTINUED | OUTPATIENT
Start: 2021-02-11 | End: 2021-02-11 | Stop reason: HOSPADM

## 2021-02-11 RX ORDER — DIPHENHYDRAMINE HYDROCHLORIDE 50 MG/ML
25 INJECTION INTRAMUSCULAR; INTRAVENOUS EVERY 6 HOURS PRN
Status: ACTIVE | OUTPATIENT
Start: 2021-02-11 | End: 2021-02-12

## 2021-02-11 RX ORDER — DOCUSATE SODIUM 100 MG/1
100 CAPSULE, LIQUID FILLED ORAL 2 TIMES DAILY PRN
Status: DISCONTINUED | OUTPATIENT
Start: 2021-02-11 | End: 2021-02-15 | Stop reason: HOSPADM

## 2021-02-11 RX ORDER — SODIUM CHLORIDE, SODIUM LACTATE, POTASSIUM CHLORIDE, CALCIUM CHLORIDE 600; 310; 30; 20 MG/100ML; MG/100ML; MG/100ML; MG/100ML
INJECTION, SOLUTION INTRAVENOUS PRN
Status: DISCONTINUED | OUTPATIENT
Start: 2021-02-11 | End: 2021-02-15 | Stop reason: HOSPADM

## 2021-02-11 RX ORDER — HALOPERIDOL 5 MG/ML
1 INJECTION INTRAMUSCULAR
Status: DISCONTINUED | OUTPATIENT
Start: 2021-02-11 | End: 2021-02-11 | Stop reason: HOSPADM

## 2021-02-11 RX ORDER — HYDROMORPHONE HYDROCHLORIDE 1 MG/ML
0.4 INJECTION, SOLUTION INTRAMUSCULAR; INTRAVENOUS; SUBCUTANEOUS
Status: ACTIVE | OUTPATIENT
Start: 2021-02-11 | End: 2021-02-12

## 2021-02-11 RX ORDER — DIPHENHYDRAMINE HYDROCHLORIDE 50 MG/ML
12.5 INJECTION INTRAMUSCULAR; INTRAVENOUS
Status: DISCONTINUED | OUTPATIENT
Start: 2021-02-11 | End: 2021-02-11 | Stop reason: HOSPADM

## 2021-02-11 RX ORDER — CEFAZOLIN SODIUM 1 G/3ML
INJECTION, POWDER, FOR SOLUTION INTRAMUSCULAR; INTRAVENOUS PRN
Status: DISCONTINUED | OUTPATIENT
Start: 2021-02-11 | End: 2021-02-11 | Stop reason: SURG

## 2021-02-11 RX ORDER — MISOPROSTOL 200 UG/1
600 TABLET ORAL
Status: DISCONTINUED | OUTPATIENT
Start: 2021-02-11 | End: 2021-02-15 | Stop reason: HOSPADM

## 2021-02-11 RX ORDER — OXYCODONE HCL 5 MG/5 ML
5 SOLUTION, ORAL ORAL
Status: DISCONTINUED | OUTPATIENT
Start: 2021-02-11 | End: 2021-02-11 | Stop reason: HOSPADM

## 2021-02-11 RX ORDER — OXYCODONE HYDROCHLORIDE 10 MG/1
10 TABLET ORAL EVERY 4 HOURS PRN
Status: ACTIVE | OUTPATIENT
Start: 2021-02-11 | End: 2021-02-12

## 2021-02-11 RX ORDER — CITRIC ACID/SODIUM CITRATE 334-500MG
30 SOLUTION, ORAL ORAL ONCE
Status: COMPLETED | OUTPATIENT
Start: 2021-02-11 | End: 2021-02-11

## 2021-02-11 RX ADMIN — ACETAMINOPHEN 1000 MG: 500 TABLET ORAL at 21:26

## 2021-02-11 RX ADMIN — SODIUM CHLORIDE, SODIUM GLUCONATE, SODIUM ACETATE, POTASSIUM CHLORIDE AND MAGNESIUM CHLORIDE: 526; 502; 368; 37; 30 INJECTION, SOLUTION INTRAVENOUS at 18:27

## 2021-02-11 RX ADMIN — KETOROLAC TROMETHAMINE 30 MG: 30 INJECTION, SOLUTION INTRAMUSCULAR at 19:23

## 2021-02-11 RX ADMIN — PHENYLEPHRINE HYDROCHLORIDE 100 MCG: 10 INJECTION INTRAVENOUS at 18:38

## 2021-02-11 RX ADMIN — MORPHINE SULFATE 100 MCG: 0.5 INJECTION, SOLUTION EPIDURAL; INTRATHECAL; INTRAVENOUS at 18:32

## 2021-02-11 RX ADMIN — BUPIVACAINE HYDROCHLORIDE IN DEXTROSE 1.5 ML: 7.5 INJECTION, SOLUTION SUBARACHNOID at 18:32

## 2021-02-11 RX ADMIN — OXYCODONE 5 MG: 5 TABLET ORAL at 23:06

## 2021-02-11 RX ADMIN — KETOROLAC TROMETHAMINE 30 MG: 30 INJECTION, SOLUTION INTRAMUSCULAR; INTRAVENOUS at 23:06

## 2021-02-11 RX ADMIN — PHENYLEPHRINE HYDROCHLORIDE 100 MCG: 10 INJECTION INTRAVENOUS at 19:11

## 2021-02-11 RX ADMIN — OXYTOCIN 20 UNITS: 10 INJECTION, SOLUTION INTRAMUSCULAR; INTRAVENOUS at 20:10

## 2021-02-11 RX ADMIN — PHENYLEPHRINE HYDROCHLORIDE 100 MCG: 10 INJECTION INTRAVENOUS at 18:35

## 2021-02-11 RX ADMIN — ONDANSETRON 4 MG: 2 INJECTION INTRAMUSCULAR; INTRAVENOUS at 19:12

## 2021-02-11 RX ADMIN — OXYTOCIN 20 UNITS: 10 INJECTION, SOLUTION INTRAMUSCULAR; INTRAVENOUS at 18:58

## 2021-02-11 RX ADMIN — FENTANYL CITRATE 15 MCG: 50 INJECTION, SOLUTION INTRAMUSCULAR; INTRAVENOUS at 18:32

## 2021-02-11 RX ADMIN — FAMOTIDINE 20 MG: 10 INJECTION INTRAVENOUS at 18:08

## 2021-02-11 RX ADMIN — METOCLOPRAMIDE HYDROCHLORIDE 10 MG: 5 INJECTION INTRAMUSCULAR; INTRAVENOUS at 18:09

## 2021-02-11 RX ADMIN — PHENYLEPHRINE HYDROCHLORIDE 20 MCG/MIN: 10 INJECTION INTRAVENOUS at 18:45

## 2021-02-11 RX ADMIN — SODIUM CITRATE AND CITRIC ACID MONOHYDRATE 30 ML: 500; 334 SOLUTION ORAL at 18:09

## 2021-02-11 RX ADMIN — SODIUM CHLORIDE, SODIUM GLUCONATE, SODIUM ACETATE, POTASSIUM CHLORIDE AND MAGNESIUM CHLORIDE 1500 ML: 526; 502; 368; 37; 30 INJECTION, SOLUTION INTRAVENOUS at 18:10

## 2021-02-11 RX ADMIN — CEFAZOLIN 2 G: 330 INJECTION, POWDER, FOR SOLUTION INTRAMUSCULAR; INTRAVENOUS at 18:38

## 2021-02-11 ASSESSMENT — LIFESTYLE VARIABLES
EVER_SMOKED: NEVER
ALCOHOL_USE: NO

## 2021-02-11 ASSESSMENT — PATIENT HEALTH QUESTIONNAIRE - PHQ9
SUM OF ALL RESPONSES TO PHQ9 QUESTIONS 1 AND 2: 0
1. LITTLE INTEREST OR PLEASURE IN DOING THINGS: NOT AT ALL
2. FEELING DOWN, DEPRESSED, IRRITABLE, OR HOPELESS: NOT AT ALL

## 2021-02-11 ASSESSMENT — FIBROSIS 4 INDEX: FIB4 SCORE: 0.4

## 2021-02-11 ASSESSMENT — PAIN SCALES - GENERAL
PAINLEVEL: 0 - NO PAIN
PAIN_LEVEL: 0

## 2021-02-11 ASSESSMENT — PAIN DESCRIPTION - PAIN TYPE
TYPE: SURGICAL PAIN
TYPE: ACUTE PAIN
TYPE: ACUTE PAIN

## 2021-02-11 NOTE — H&P
History and physical     2021    Chief complaint:       History of present illness: Patient is a 35-year-old female para 0 whose estimated due date is  based on in vitro fertilization.  Making her 38-1/7 weeks.  Patient presented with gross rupture of amniotic sac.  Amniotic fluid ruptured around 12 this afternoon.    Patient was scheduled for  tomorrow for breech presentation and type 2 diabetes.    Medical history: Type 2 diabetes    Surgical history: None    Habits: Patient denies tobacco alcohol or drug use    Allergies: No known drug allergies    Medications: Metformin 1000 mg twice daily  Insulin intermediate acting 28 units at night  Regular insulin 18 units in the morning, 22 units and lunchtime and 20 units at dinner    Obstetric history: Patient is Rh+, rubella immune, group B strep negative    Family history: NoncontributoryTo current problem    Review of systems: Negative 9 point review of systems    Physical exam  Vitals: Normal  General: Patient is awake alert pleasant  Head: Atraumatic normocephalic  Lungs: Clear to auscultation  Heart: Regular rate and rhythm without murmurs  Abdomen gravid:  Pelvic:  Extremities: Normal    Assessment:  1-intrauterine pregnancy at 38-1/7 weeks  2-type 2 diabetes  3-breech presentation      Plan:  Plan is to proceed with a primary  section.  Risks and complications of  section were discussed.  Patient has read and signed consent form in office and knowledge understanding.  We discussed any other complications could potentially result in permanent disability, death or chronic pain or multiple surgeries.

## 2021-02-11 NOTE — PROGRESS NOTES
EDC - 2/ EGA - 38.1  Pt had cerclage removed 2 weeks ago for previous loss at 15 weeks. Pt is Type II DM-insulin dependent  Pt is scheduled tomorrow for  for breech presentation.    1252 - Pt arrived to labor and delivery for SROM at 1130. Pt placed in room LDA1. External monitors in place X2. Category I FHT at this time. VSS. Pt reports good FM. No complaints of contractions or vaginal bleeding.   1302 SSE Gross polling of clear fluid noted, /-2 fetal parts noted on exam. FOB at bedside. POC discussed with pt and family members, all questions answered.   1320 Dr. Coon at bedside, C-Section called, US confirmed breech presentation. Pt will be prepped at this time. Will plan to do  at 1700, pt ate last at 1000.  1340 Report given to EDWARD Fink RN

## 2021-02-12 LAB
BASOPHILS # BLD AUTO: 0.4 % (ref 0–1.8)
BASOPHILS # BLD: 0.03 K/UL (ref 0–0.12)
EOSINOPHIL # BLD AUTO: 0.15 K/UL (ref 0–0.51)
EOSINOPHIL NFR BLD: 1.8 % (ref 0–6.9)
ERYTHROCYTE [DISTWIDTH] IN BLOOD BY AUTOMATED COUNT: 51.5 FL (ref 35.9–50)
ERYTHROCYTE [DISTWIDTH] IN BLOOD BY AUTOMATED COUNT: 52.4 FL (ref 35.9–50)
GLUCOSE BLD-MCNC: 133 MG/DL (ref 65–99)
GLUCOSE BLD-MCNC: 70 MG/DL (ref 65–99)
GLUCOSE BLD-MCNC: 73 MG/DL (ref 65–99)
GLUCOSE BLD-MCNC: 96 MG/DL (ref 65–99)
HCT VFR BLD AUTO: 30.6 % (ref 37–47)
HCT VFR BLD AUTO: 32.4 % (ref 37–47)
HGB BLD-MCNC: 10.5 G/DL (ref 12–16)
HGB BLD-MCNC: 9.8 G/DL (ref 12–16)
IMM GRANULOCYTES # BLD AUTO: 0.11 K/UL (ref 0–0.11)
IMM GRANULOCYTES NFR BLD AUTO: 1.3 % (ref 0–0.9)
LYMPHOCYTES # BLD AUTO: 2.41 K/UL (ref 1–4.8)
LYMPHOCYTES NFR BLD: 28.1 % (ref 22–41)
MCH RBC QN AUTO: 25.2 PG (ref 27–33)
MCH RBC QN AUTO: 25.5 PG (ref 27–33)
MCHC RBC AUTO-ENTMCNC: 31.7 G/DL (ref 33.6–35)
MCHC RBC AUTO-ENTMCNC: 32 G/DL (ref 33.6–35)
MCV RBC AUTO: 79.5 FL (ref 81.4–97.8)
MCV RBC AUTO: 79.7 FL (ref 81.4–97.8)
MONOCYTES # BLD AUTO: 0.69 K/UL (ref 0–0.85)
MONOCYTES NFR BLD AUTO: 8.1 % (ref 0–13.4)
NEUTROPHILS # BLD AUTO: 5.18 K/UL (ref 2–7.15)
NEUTROPHILS NFR BLD: 60.3 % (ref 44–72)
NRBC # BLD AUTO: 0 K/UL
NRBC BLD-RTO: 0 /100 WBC
PLATELET # BLD AUTO: 280 K/UL (ref 164–446)
PLATELET # BLD AUTO: 293 K/UL (ref 164–446)
PMV BLD AUTO: 10.1 FL (ref 9–12.9)
PMV BLD AUTO: 10.8 FL (ref 9–12.9)
RBC # BLD AUTO: 3.84 M/UL (ref 4.2–5.4)
RBC # BLD AUTO: 4.09 M/UL (ref 4.2–5.4)
WBC # BLD AUTO: 10.4 K/UL (ref 4.8–10.8)
WBC # BLD AUTO: 8.6 K/UL (ref 4.8–10.8)

## 2021-02-12 PROCEDURE — 700102 HCHG RX REV CODE 250 W/ 637 OVERRIDE(OP): Performed by: ANESTHESIOLOGY

## 2021-02-12 PROCEDURE — 85027 COMPLETE CBC AUTOMATED: CPT

## 2021-02-12 PROCEDURE — 82962 GLUCOSE BLOOD TEST: CPT

## 2021-02-12 PROCEDURE — A9270 NON-COVERED ITEM OR SERVICE: HCPCS | Performed by: ANESTHESIOLOGY

## 2021-02-12 PROCEDURE — 85025 COMPLETE CBC W/AUTO DIFF WBC: CPT

## 2021-02-12 PROCEDURE — 700111 HCHG RX REV CODE 636 W/ 250 OVERRIDE (IP): Performed by: ANESTHESIOLOGY

## 2021-02-12 PROCEDURE — A9270 NON-COVERED ITEM OR SERVICE: HCPCS | Performed by: OBSTETRICS & GYNECOLOGY

## 2021-02-12 PROCEDURE — 770002 HCHG ROOM/CARE - OB PRIVATE (112)

## 2021-02-12 PROCEDURE — 700105 HCHG RX REV CODE 258: Performed by: OBSTETRICS & GYNECOLOGY

## 2021-02-12 PROCEDURE — 700102 HCHG RX REV CODE 250 W/ 637 OVERRIDE(OP): Performed by: OBSTETRICS & GYNECOLOGY

## 2021-02-12 PROCEDURE — 36415 COLL VENOUS BLD VENIPUNCTURE: CPT

## 2021-02-12 RX ORDER — KETOROLAC TROMETHAMINE 30 MG/ML
30 INJECTION, SOLUTION INTRAMUSCULAR; INTRAVENOUS EVERY 6 HOURS
Status: DISCONTINUED | OUTPATIENT
Start: 2021-02-12 | End: 2021-02-13

## 2021-02-12 RX ORDER — SODIUM CHLORIDE, SODIUM LACTATE, POTASSIUM CHLORIDE, AND CALCIUM CHLORIDE .6; .31; .03; .02 G/100ML; G/100ML; G/100ML; G/100ML
500 INJECTION, SOLUTION INTRAVENOUS ONCE
Status: COMPLETED | OUTPATIENT
Start: 2021-02-12 | End: 2021-02-13

## 2021-02-12 RX ORDER — DIPHENHYDRAMINE HYDROCHLORIDE 50 MG/ML
25 INJECTION INTRAMUSCULAR; INTRAVENOUS EVERY 6 HOURS PRN
Status: DISCONTINUED | OUTPATIENT
Start: 2021-02-12 | End: 2021-02-15 | Stop reason: HOSPADM

## 2021-02-12 RX ORDER — ONDANSETRON 4 MG/1
4 TABLET, ORALLY DISINTEGRATING ORAL EVERY 6 HOURS PRN
Status: DISCONTINUED | OUTPATIENT
Start: 2021-02-12 | End: 2021-02-15 | Stop reason: HOSPADM

## 2021-02-12 RX ORDER — ACETAMINOPHEN 325 MG/1
325 TABLET ORAL EVERY 4 HOURS PRN
Status: DISCONTINUED | OUTPATIENT
Start: 2021-02-12 | End: 2021-02-15 | Stop reason: HOSPADM

## 2021-02-12 RX ORDER — ONDANSETRON 2 MG/ML
4 INJECTION INTRAMUSCULAR; INTRAVENOUS EVERY 6 HOURS PRN
Status: DISCONTINUED | OUTPATIENT
Start: 2021-02-12 | End: 2021-02-15 | Stop reason: HOSPADM

## 2021-02-12 RX ORDER — MORPHINE SULFATE 10 MG/ML
4 INJECTION, SOLUTION INTRAMUSCULAR; INTRAVENOUS
Status: DISCONTINUED | OUTPATIENT
Start: 2021-02-12 | End: 2021-02-15 | Stop reason: HOSPADM

## 2021-02-12 RX ORDER — IBUPROFEN 600 MG/1
600 TABLET ORAL EVERY 6 HOURS PRN
Status: DISCONTINUED | OUTPATIENT
Start: 2021-02-12 | End: 2021-02-13

## 2021-02-12 RX ORDER — OXYCODONE HYDROCHLORIDE AND ACETAMINOPHEN 5; 325 MG/1; MG/1
1 TABLET ORAL EVERY 4 HOURS PRN
Status: DISCONTINUED | OUTPATIENT
Start: 2021-02-12 | End: 2021-02-15 | Stop reason: HOSPADM

## 2021-02-12 RX ORDER — OXYCODONE AND ACETAMINOPHEN 10; 325 MG/1; MG/1
1 TABLET ORAL EVERY 4 HOURS PRN
Status: DISCONTINUED | OUTPATIENT
Start: 2021-02-12 | End: 2021-02-15 | Stop reason: HOSPADM

## 2021-02-12 RX ORDER — DIPHENHYDRAMINE HCL 25 MG
25 TABLET ORAL EVERY 6 HOURS PRN
Status: DISCONTINUED | OUTPATIENT
Start: 2021-02-12 | End: 2021-02-15 | Stop reason: HOSPADM

## 2021-02-12 RX ADMIN — OXYCODONE 5 MG: 5 TABLET ORAL at 13:18

## 2021-02-12 RX ADMIN — KETOROLAC TROMETHAMINE 30 MG: 30 INJECTION, SOLUTION INTRAMUSCULAR; INTRAVENOUS at 16:51

## 2021-02-12 RX ADMIN — ACETAMINOPHEN 1000 MG: 500 TABLET ORAL at 16:50

## 2021-02-12 RX ADMIN — METFORMIN HYDROCHLORIDE 1000 MG: 500 TABLET ORAL at 16:50

## 2021-02-12 RX ADMIN — SODIUM CHLORIDE, POTASSIUM CHLORIDE, SODIUM LACTATE AND CALCIUM CHLORIDE 500 ML: 600; 310; 30; 20 INJECTION, SOLUTION INTRAVENOUS at 11:20

## 2021-02-12 RX ADMIN — OXYCODONE AND ACETAMINOPHEN 1 TABLET: 5; 325 TABLET ORAL at 22:40

## 2021-02-12 RX ADMIN — SIMETHICONE 80 MG: 80 TABLET, CHEWABLE ORAL at 16:50

## 2021-02-12 RX ADMIN — KETOROLAC TROMETHAMINE 30 MG: 30 INJECTION, SOLUTION INTRAMUSCULAR; INTRAVENOUS at 05:26

## 2021-02-12 RX ADMIN — ACETAMINOPHEN 1000 MG: 500 TABLET ORAL at 05:26

## 2021-02-12 RX ADMIN — SIMETHICONE 80 MG: 80 TABLET, CHEWABLE ORAL at 22:41

## 2021-02-12 RX ADMIN — DOCUSATE SODIUM 100 MG: 100 CAPSULE, LIQUID FILLED ORAL at 16:50

## 2021-02-12 RX ADMIN — METFORMIN HYDROCHLORIDE 1000 MG: 500 TABLET ORAL at 07:40

## 2021-02-12 RX ADMIN — ACETAMINOPHEN 1000 MG: 500 TABLET ORAL at 11:20

## 2021-02-12 ASSESSMENT — PAIN DESCRIPTION - PAIN TYPE
TYPE: ACUTE PAIN

## 2021-02-12 NOTE — ANESTHESIA PREPROCEDURE EVALUATION
SROM, breech, DM2, PCOS    Relevant Problems   ENDO   (+) Type 2 diabetes mellitus with hypoglycemia, with long-term current use of insulin (HCC)       Physical Exam    Airway   Mallampati: II  TM distance: >3 FB  Neck ROM: full       Cardiovascular - normal exam  Rhythm: regular  Rate: normal  (-) murmur     Dental - normal exam           Pulmonary - normal exam  Breath sounds clear to auscultation     Abdominal    Neurological - normal exam                 Anesthesia Plan    ASA 2       Plan - spinal   Neuraxial block will be primary anesthetic                Postoperative Plan: Postoperative administration of opioids is intended.    Pertinent diagnostic labs and testing reviewed    Informed Consent:    Anesthetic plan and risks discussed with patient.

## 2021-02-12 NOTE — ANESTHESIA TIME REPORT
Anesthesia Start and Stop Event Times     Date Time Event    2/11/2021 1814 Ready for Procedure     1827 Anesthesia Start     1934 Anesthesia Stop        Responsible Staff  02/11/21    Name Role Begin End    Jacques Suarez M.D. Anesth 1827 1934        Preop Diagnosis (Free Text):  Pre-op Diagnosis     IUP 38w1d, SROM, breech presentation        Preop Diagnosis (Codes):    Post op Diagnosis  Labor and delivery, indication for care      Premium Reason  A. 3PM - 7AM    Comments:

## 2021-02-12 NOTE — ANESTHESIA PROCEDURE NOTES
Spinal Block    Date/Time: 2/11/2021 6:32 PM  Performed by: Jacques Suarez M.D.  Authorized by: Jacques Suarez M.D.     Patient Location:  OR  Start Time:  2/11/2021 6:32 PM  End Time:  2/11/2021 6:34 PM  Reason for Block: primary anesthetic    patient identified, IV checked, site marked, risks and benefits discussed, surgical consent, monitors and equipment checked, pre-op evaluation and timeout performed    Patient Position:  Sitting  Prep: ChloraPrep, patient draped and sterile technique    Monitoring:  Blood pressure, continuous pulse oximetry and heart rate  Approach:  Midline  Location:  L3-4  Injection Technique:  Single-shot  Skin infiltration:  Lidocaine  Strength:  1%  Dose:  3ml  Needle Type:  Pencan  Needle Gauge:  25 G  CSF flowing pre/post injection:  Yes  Sensory Level:  T4

## 2021-02-12 NOTE — OR SURGEON
Operative note    PreOp Diagnosis:  1-term intrauterine pregnancy at 38-1/7 weeks  2-A2 gestational diabetes  3-breech presentation  4-premature rupture of amniotic sac    PostOp Diagnosis:  1-same as above  2-delivery viable female Apgars 8/9    Procedure(s):   SECTION, PRIMARY    Surgeon(s):  Ngoc Coon M.D.    Anesthesiologist/Type of Anesthesia:  Anesthesiologist: Jacques Suarez M.D./Spinal    Surgical Staff:  Circulator: Katerin Fink R.N.    Specimens removed if any:  * No specimens in log *    Estimated Blood Loss: 750 cc    Findings: Normal pelvis    Complications: None    Indications:  Patient 35-year-old female para 0 presented at 38 weeks 1 day with ruptured amniotic sac.  Baby is in a breech presentation so we proceeded with primary  section.  Procedure    Procedure: Patient was taken the OR and given spinal anesthesia. Patient was prepped and draped in sterile manner. Efficacy of anesthesia was tested and when adequate a Pfannenstiel incision was made and carried through the fascia. Fascia was dissected bilaterally and dissected off the rectus muscle inferiorly and superiorly. Rectus muscles  midline. Peritoneum was entered sharply extended sharply inferior superiorly.     A bladder blade was placed, a low transverse uterine incision was made in the lower uterine segment. Fluid was clear. Baby was in a  breech presentation. The feet were grasped and gentle traction was applied . The baby was delivered up to the shoulders. The arms were swept across the chest.  The head was kept in a flexed presentation and baby was delivered without complication. Cord was clamped and cut and baby was handed off to nurse. Apgars were 8/9.    The placenta was expressed spontaneously. The uterus was then wiped clean and inspected for retained products of conception. When it was assured there was none a Maia retractor was placed.  I inspected for bowel impingement when there was  none we tightened it and reinspected for bowel impingement. No bowel impingement was noted.   The lower uterine incision was closed with a running 1 Vicryl suture followed by a second imbricating layer  .     Tubes and ovaries were inspected and noted be normal..     When hemostasis was assured we irrigated the pelvis and  wiped it clean.  The peritoneum was closed with a  running 3-0 Vicryl suture.   The subfascial area was irrigated and  when hemostatic the fascia was closed with a running 1 Vicryl suture.   Angie's layer was closed with  A 3- 0 Vicryl suture.  The skin was closed with a 4-0 subcuticular vicryl suture. The incision was dressed sterilely. Patient was discharged to recovery in good condition.        2/11/2021 7:33 PM Ngoc Coon M.D.

## 2021-02-12 NOTE — ANESTHESIA POSTPROCEDURE EVALUATION
Patient: Stefania Renteria    Procedure Summary     Date: 21 Room / Location: [facility] OR 02 / SURGERY LABOR AND DELIVERY    Anesthesia Start:  Anesthesia Stop:     Procedure:  SECTION, PRIMARY (N/A Abdomen) Diagnosis: (same, del)    Surgeons: Ngoc Coon M.D. Responsible Provider: Jacques Suarez M.D.    Anesthesia Type: spinal ASA Status: 2          Final Anesthesia Type: spinal  Last vitals  BP   Blood Pressure: 122/76    Temp   36.5 °C (97.7 °F)    Pulse   72   Resp   18    SpO2   96 %      Anesthesia Post Evaluation    Patient location during evaluation: PACU  Patient participation: complete - patient participated  Level of consciousness: awake and alert  Pain score: 0    Airway patency: patent  Anesthetic complications: no  Cardiovascular status: hemodynamically stable  Respiratory status: acceptable  Hydration status: euvolemic    PONV: none          There were no known complications for this encounter.     Nurse Pain Score: 0 (NPRS)

## 2021-02-12 NOTE — PROGRESS NOTES
0800 Assessment completed, fundus firm, lochia light, ABD incision with pressure dressing intact, POC reviewed, verbalized understanding. Denies pain at this time, will call if pain med intervention needed.     1000 Called Dr. Obrien regarding MONIQUE's low urine output <30ml/hr. MD ordered 500mL LR bolus, hold Toradol at this time, and call back for update at 1200.    1200 Reported to MD that MONIQUE had 100mL urine out after bolus was given. MD okay to discontinue foely in a few hours as long as her urine out is appropriate. Also reported MD that That Dr. Coon removed the pressure dressing and there wad clear fluid tinged blood oozing from the side of the Tegaderm dressing. MD ordered to replace the dressing and MD will round on MOB later today.     1700 Discontinued Ahmadi. MONIQUE got up to go to the bathroom and noticed that her incision started to bleed. RN assessed her incision and found that the blood was oozing heavly from her incision. RN notified charge RN and called Dr. Obrien. RN put pressure on the incision while waiting for Dr. Obrien to arrive.    1800 MD arrived and assessed MOB. Steri strips were placed and also wound vac was placed. Endorsed to SARITHA Skinner.

## 2021-02-12 NOTE — PROGRESS NOTES
1340 Report received from LINCOLN Osuna RN at bedside and assumed care. POC discussed with pt and s/o and encouraged to state needs or questions at any time. Pre-op started.    1615 Case bumped for emergency case, Dr. Coon and pt notified.    1800 Dr. Suarez at bedside, POC discussed with pt and s/o. Pt consented for anesthesia.    1823 Pt transferred to OR ambulatory.    1933 Pt transferred to PACU via gurney with side rails up.    2045 Pt transferred to PP via gurney with side rails up, infant in arms. Pt and infant stable.    2100 Report given to Susanne MELARA at bedside.

## 2021-02-12 NOTE — PROGRESS NOTES
Patient educated on plan of care, including, safety, mobility, pain management, and medication administration. Patient asked to call for help with next breastfeed. Patient requesting to call for pain medication when needed. Patient has no needs or concerns at this time. Call light within reach. Reviewed postpartum education.   Spoke with MD Coon and had metformin ordered for patient. MD Coon wishes to have glucose checks on patient for 24 hours.

## 2021-02-12 NOTE — PROGRESS NOTES
Progress Note        Subjective: Patient is doing well lochia is normal.  Has normal GI and  function    Objective: Vital signs are normal  General: Patient's awake alert pleasant  Head: Atraumatic normocephalic  Abdomen: Fundus is firm, appropriate tenderness, incision is slightly more firm than expected  Incision: Clean and dry    Assessment:  Postpartum day 1  section  A2 gestational diabetes    Plan:  Routine care  Wound care discussed and precautions reviewed.  Metformin 1000 mg twice daily being taken for diabetes    Ngoc Coon MD

## 2021-02-12 NOTE — PROGRESS NOTES
Performed fundal check on patient. Pad saturated, however, bleeding coming from incion site with Tegaderm. Dressing removed as completely saturated and Tegaderm and gauze placed over site. MD Coon notified of saturated pad and dressing, as well as induration around incision. Order to reinforce dressing and place pressure dressing on top.

## 2021-02-12 NOTE — CARE PLAN
Problem: Altered physiologic condition related to postoperative  delivery  Goal: Patient physiologically stable as evidenced by normal lochia, palpable uterine involution and vital signs within normal limits  Outcome: PROGRESSING AS EXPECTED  Note: Fundus firm. Lochia light. Patient educated to call if saturating a pad in more than hour or for large clots.       Problem: Potential knowledge deficit related to lack of understanding of self and  care  Goal: Patient will verbalize understanding of self and infant care  Outcome: PROGRESSING AS EXPECTED  Note: Postpartum education reviewed with patient . Patient updated on plan of care. Patient has no questions or concerns at this time. Patient is able to care for self and infant.

## 2021-02-13 PROCEDURE — A9270 NON-COVERED ITEM OR SERVICE: HCPCS | Performed by: OBSTETRICS & GYNECOLOGY

## 2021-02-13 PROCEDURE — 700102 HCHG RX REV CODE 250 W/ 637 OVERRIDE(OP): Performed by: OBSTETRICS & GYNECOLOGY

## 2021-02-13 PROCEDURE — 700111 HCHG RX REV CODE 636 W/ 250 OVERRIDE (IP): Performed by: OBSTETRICS & GYNECOLOGY

## 2021-02-13 PROCEDURE — 770002 HCHG ROOM/CARE - OB PRIVATE (112)

## 2021-02-13 RX ADMIN — OXYCODONE HYDROCHLORIDE AND ACETAMINOPHEN 1 TABLET: 10; 325 TABLET ORAL at 15:15

## 2021-02-13 RX ADMIN — OXYCODONE AND ACETAMINOPHEN 1 TABLET: 5; 325 TABLET ORAL at 20:14

## 2021-02-13 RX ADMIN — MORPHINE SULFATE 4 MG: 10 INJECTION INTRAVENOUS at 01:39

## 2021-02-13 RX ADMIN — OXYCODONE HYDROCHLORIDE AND ACETAMINOPHEN 1 TABLET: 10; 325 TABLET ORAL at 02:56

## 2021-02-13 RX ADMIN — MORPHINE SULFATE 4 MG: 10 INJECTION INTRAVENOUS at 01:10

## 2021-02-13 RX ADMIN — SIMETHICONE 80 MG: 80 TABLET, CHEWABLE ORAL at 15:20

## 2021-02-13 RX ADMIN — OXYCODONE HYDROCHLORIDE AND ACETAMINOPHEN 1 TABLET: 10; 325 TABLET ORAL at 07:02

## 2021-02-13 RX ADMIN — OXYCODONE HYDROCHLORIDE AND ACETAMINOPHEN 1 TABLET: 10; 325 TABLET ORAL at 10:59

## 2021-02-13 RX ADMIN — SIMETHICONE 80 MG: 80 TABLET, CHEWABLE ORAL at 07:02

## 2021-02-13 RX ADMIN — SIMETHICONE 80 MG: 80 TABLET, CHEWABLE ORAL at 21:41

## 2021-02-13 RX ADMIN — METFORMIN HYDROCHLORIDE 1000 MG: 500 TABLET ORAL at 07:49

## 2021-02-13 RX ADMIN — METFORMIN HYDROCHLORIDE 1000 MG: 500 TABLET ORAL at 17:19

## 2021-02-13 ASSESSMENT — PAIN DESCRIPTION - PAIN TYPE
TYPE: SURGICAL PAIN
TYPE: ACUTE PAIN
TYPE: ACUTE PAIN
TYPE: SURGICAL PAIN
TYPE: SURGICAL PAIN;ACUTE PAIN
TYPE: ACUTE PAIN
TYPE: ACUTE PAIN
TYPE: SURGICAL PAIN
TYPE: ACUTE PAIN

## 2021-02-13 ASSESSMENT — EDINBURGH POSTNATAL DEPRESSION SCALE (EPDS)
I HAVE BEEN ABLE TO LAUGH AND SEE THE FUNNY SIDE OF THINGS: AS MUCH AS I ALWAYS COULD
I HAVE BLAMED MYSELF UNNECESSARILY WHEN THINGS WENT WRONG: NO, NEVER
THINGS HAVE BEEN GETTING ON TOP OF ME: NO, I HAVE BEEN COPING AS WELL AS EVER
I HAVE LOOKED FORWARD WITH ENJOYMENT TO THINGS: AS MUCH AS I EVER DID
I HAVE FELT SCARED OR PANICKY FOR NO GOOD REASON: NO, NOT MUCH
I HAVE BEEN SO UNHAPPY THAT I HAVE HAD DIFFICULTY SLEEPING: NOT AT ALL
THE THOUGHT OF HARMING MYSELF HAS OCCURRED TO ME: NEVER
I HAVE BEEN ANXIOUS OR WORRIED FOR NO GOOD REASON: NO, NOT AT ALL
I HAVE BEEN SO UNHAPPY THAT I HAVE BEEN CRYING: NO, NEVER
I HAVE FELT SAD OR MISERABLE: NO, NOT AT ALL

## 2021-02-13 NOTE — PROGRESS NOTES
Patient educated on plan of care, including, safety, mobility, pain management, and medication administration. Patient asked to call for help with next breastfeed. Patient requesting to call for pain medication when needed. Patient has no needs or concerns at this time. Call light within reach. Patient started pumping as infant is sleepy and continues to not latch after 24 hours. 15 mL pumped and fed back to infant on first pump attempt. Feeding guidelines given to parents.

## 2021-02-13 NOTE — PROGRESS NOTES
Progress Note    Stefania Renteria   Pod 1    Subjective:  Called to see pt as incision is bleeding still.   Apparently she had a pressure dressing overnight that Dr Coon removed and placed tegaderm. This early afternoon - the nurse stated it had come up and was oozing/bleeding and the tegaderm was removed and placed per my orders.   By this pm that one had come up and she was bleeding/and leaking around the tegaderm.   Her pain is controlled.     Vitals:    02/12/21 0900 02/12/21 1000 02/12/21 1100 02/12/21 1400   BP:  109/69  117/76   Pulse: 82 82 73 82   Resp: 18 18 18 18   Temp:  36.3 °C (97.4 °F)  36.8 °C (98.3 °F)   TempSrc:  Temporal  Temporal   SpO2: 98% 95% 96% 97%   Weight:       Height:           Exam:  Gen: she is lying flat/but good affect  Abdomen: soft nt/nd  Inc: apx 15-20cc dark clotted area under tegaderm once removed. Just left of midline there is an opening where dark blood and small amt of clot is extruding through the incision. And some firmness around that area of the incision present.sterile qtip was used to explore the incision carefully: some more dark blood resulted but no big clots.  Incision was cleaned and dryed - mastasol and steri strips placed loosely  And then previna dressing was placed -   Will continue to monitor closely -   Labs:   Recent Results (from the past 24 hour(s))   CBC without differential    Collection Time: 02/12/21  3:21 AM   Result Value Ref Range    WBC 10.4 4.8 - 10.8 K/uL    RBC 4.09 (L) 4.20 - 5.40 M/uL    Hemoglobin 10.5 (L) 12.0 - 16.0 g/dL    Hematocrit 32.4 (L) 37.0 - 47.0 %    MCV 79.5 (L) 81.4 - 97.8 fL    MCH 25.2 (L) 27.0 - 33.0 pg    MCHC 31.7 (L) 33.6 - 35.0 g/dL    RDW 51.5 (H) 35.9 - 50.0 fL    Platelet Count 293 164 - 446 K/uL    MPV 10.8 9.0 - 12.9 fL   ACCU-CHEK GLUCOSE    Collection Time: 02/12/21  6:47 AM   Result Value Ref Range    Glucose - Accu-Ck 70 65 - 99 mg/dL   ACCU-CHEK GLUCOSE    Collection Time: 02/12/21 11:28 AM   Result Value Ref  Range    Glucose - Accu-Ck 73 65 - 99 mg/dL   ACCU-CHEK GLUCOSE    Collection Time: 02/12/21  4:57 PM   Result Value Ref Range    Glucose - Accu-Ck 96 65 - 99 mg/dL       Assessment:   Wound hematomoa    Plan:  Will continue to monitior and see if prevena will keep it clean and dry. If it does not pt and  are aware that the incision may need to be opened and cleaned and either packed wet/dry vs secondary closure.    Will hold toradol/motrin tonight.     Marivel Obrien M.D.

## 2021-02-13 NOTE — LACTATION NOTE
This note was copied from a baby's chart.  [facility]- met with POB for follow-up visit, MOB was breastfeeding baby on her right breast when LC arrived, baby was sleepy at the breast, occasional bursts of active suckling as well as bursts of ineffective suckling noted, unable to achieve effective and prolonged maintenance of active or effective suckling, attempted to burp baby and then baby was moved to the left breast, LC provided education on and assistance with proper positioning for a deep latch, latch achieved, again effective and ineffective suckling patterns noted, no prolonged effective feeding noted    MOB has history of IDDM2, PCOS, IVF, AMA, obesity, MOB with widely-spaced breasts, large drops of colostrum easily hand expressed, discussed potential impact of health issues on milk supply, encouraged frequent [id number] and breastfeeding to help maximize milk supply, encouraged to pump after breastfeeding if feeding is sub-optimal, discussed signs of an effective vs ineffective latch as well as a nutritive vs non-nutritive suck    Plan:  Ad maude breastfeeding Q 3 hours, more often if feeding cues noted  [id number]  Pump and feed back EBM for any sub-optimal feeding    MOB states she has used breast pump a few times, verified understanding of proper pump use and settings, instructed to decrease speed from 80-60 after 2 minutes and to set suction to highest level that's still comfortable    MOB states she is having a Spectra breast pump sent to home     MOB denies having any additional questions or concerns for LC at this time    Encouraged to call for assistance as needed

## 2021-02-13 NOTE — PROGRESS NOTES
Progress Note    Stefania Renteria   Post-op day 2  Chief Admitting Dx: Indication for care in labor or delivery [O75.9]  Delivery Type:      Subjective:  Ambulating:voiding, tolerating diet, normal lochia, Had a rough night with pain control but figured out it was always when she did not get up to void ealry enough. Its been better since then  Her incision still has  Dressing and its dry - no blood in suction/cartridge.   fsbs have all been good.     Vitals:    21 1100 21 1400 21 1940 21 0600   BP:  117/76 128/79 126/74   Pulse: 73 82 94 83   Resp: 18 18 16 18   Temp:  36.8 °C (98.3 °F) 36.7 °C (98 °F) 36.6 °C (97.8 °F)   TempSrc:  Temporal Temporal Temporal   SpO2: 96% 97% 97% 93%   Weight:       Height:           Exam:  Gen: looks better today; no acute distress  Abd: soft nt/nd  Inc: prevena dressing in tact and in place - some mild bruising present on mons (not new)  Ext: no calf pain mike LE    Labs:   Recent Results (from the past 24 hour(s))   ACCU-CHEK GLUCOSE    Collection Time: 21  4:57 PM   Result Value Ref Range    Glucose - Accu-Ck 96 65 - 99 mg/dL   CBC WITH DIFFERENTIAL    Collection Time: 21  8:31 PM   Result Value Ref Range    WBC 8.6 4.8 - 10.8 K/uL    RBC 3.84 (L) 4.20 - 5.40 M/uL    Hemoglobin 9.8 (L) 12.0 - 16.0 g/dL    Hematocrit 30.6 (L) 37.0 - 47.0 %    MCV 79.7 (L) 81.4 - 97.8 fL    MCH 25.5 (L) 27.0 - 33.0 pg    MCHC 32.0 (L) 33.6 - 35.0 g/dL    RDW 52.4 (H) 35.9 - 50.0 fL    Platelet Count 280 164 - 446 K/uL    MPV 10.1 9.0 - 12.9 fL    Neutrophils-Polys 60.30 44.00 - 72.00 %    Lymphocytes 28.10 22.00 - 41.00 %    Monocytes 8.10 0.00 - 13.40 %    Eosinophils 1.80 0.00 - 6.90 %    Basophils 0.40 0.00 - 1.80 %    Immature Granulocytes 1.30 (H) 0.00 - 0.90 %    Nucleated RBC 0.00 /100 WBC    Neutrophils (Absolute) 5.18 2.00 - 7.15 K/uL    Lymphs (Absolute) 2.41 1.00 - 4.80 K/uL    Monos (Absolute) 0.69 0.00 - 0.85 K/uL    Eos (Absolute) 0.15 0.00 -  0.51 K/uL    Baso (Absolute) 0.03 0.00 - 0.12 K/uL    Immature Granulocytes (abs) 0.11 0.00 - 0.11 K/uL    NRBC (Absolute) 0.00 K/uL   ACCU-CHEK GLUCOSE    Collection Time: 02/12/21  9:53 PM   Result Value Ref Range    Glucose - Accu-Ck 133 (H) 65 - 99 mg/dL       Assessment:plan  Pod 2  Type 2 DM - has a continuous monitor and stable w/out insulin but on metformin  Incision: had wound hematoma/slight wound separation yesterday - dressing now clean dry intact and no evidence of active bleeding.  (H/h stable yesterday)  May shower  Routine postop  Pain control    Marivel Obrien M.D.

## 2021-02-13 NOTE — LACTATION NOTE
This note was copied from a baby's chart.  Infant was born yesterday via c/s at 1856. Mother has history of infertility, PCOS, DM2, AMA, and infant was conceived via IVF. Mother reports fertility issues are both from her and father of baby. She states infant has been sleepy at breast, but she has been working on hand expression and feeding it back to infant. Drops of colostrum easily seen with hand expression. We discussed  feeding behaviors, that that sleepiness in first 24 hours is expected, but clusterfeeding will most likely occur after 24 hours of life. Reviewed feeding cues, and optimal feeding frequencies, and lengths of feeds. She currently has infant skin to skin at the moment, and she had attempted feeding an hour ago. No cues seen from infant, so discussed positioning tips and strategies. I was able to visually demonstrate cross cradle hold and football hold, and ideal hand placement for breast and infant support. Discussed maternal and infant body alignment, tummy to tummy, her posture and angle of latch.     Plan is to breastfeed with cues, no more than 3-4 hours from last feeding, goal of 8 or more feedings in a 24 hour period. If infant remains sleepy, feeding plan may be initiated.    I discussed with Lonnie MELARA, that due to mother's risk factors as listed above, mother should start pumping to help her establish supply.     No other lactation questions or concerns at this time. Encouraged her to call for latch assistance/support as needed.

## 2021-02-13 NOTE — CARE PLAN
Problem: Alteration in comfort related to surgical incision and/or after birth pains  Goal: Patient is able to ambulate, care for self and infant with acceptable pain level  Outcome: PROGRESSING AS EXPECTED  Note: Patient requests to call for pain medication when needed. Patient aware of available prn medication and available nonpharmacologic pain intervention. Patient able to care for self and infant at current pain level.       Problem: Potential knowledge deficit related to lack of understanding of self and  care  Goal: Patient will demonstrate ability to care for self and infant  Outcome: PROGRESSING AS EXPECTED  Note: Patient providing care for infant. Patient verbalizes acceptance of feeding plan. Patient able to care for self. Patient asks appropriate questions.

## 2021-02-13 NOTE — CARE PLAN
Problem: Pain Management  Goal: Pain level will decrease to patient's comfort goal  Outcome: PROGRESSING AS EXPECTED  Flowsheets (Taken 2/13/2021 4971)  Pain Rating Scale (NPRS): 3  Note: Patient denies need for pain medication at this time. Patient states she will call if experiencing discomfort.      Problem: Potential anxiety related to difficulty adapting to parental role  Goal: Patient will verbalize and demonstrate effective bonding and parenting behavior  Outcome: PROGRESSING AS EXPECTED  Note: Patient bonding well with baby.holding and comforting baby when needed. Will call for assistance with breastfeeding.

## 2021-02-13 NOTE — PROGRESS NOTES
Assessment done. Pt.complains of pain at 3. Incision covered with wound vac dressing  With no drainage in chamber. Reddish bruise noted below incision.Lochia scant to light. Pt ambulating in room without dizziness or assistance.Pt. Passing flatus, no problems urinating.patient pain not as severe after she voids. Denies urinary irritation or frequency. Voiding adequate amount. Patient encouraged to void every 3 hours to avoid bladder distension. Fob in room ,supportive.plan of care for today discussed.

## 2021-02-14 PROCEDURE — A9270 NON-COVERED ITEM OR SERVICE: HCPCS | Performed by: OBSTETRICS & GYNECOLOGY

## 2021-02-14 PROCEDURE — 700102 HCHG RX REV CODE 250 W/ 637 OVERRIDE(OP): Performed by: OBSTETRICS & GYNECOLOGY

## 2021-02-14 PROCEDURE — 700111 HCHG RX REV CODE 636 W/ 250 OVERRIDE (IP): Performed by: OBSTETRICS & GYNECOLOGY

## 2021-02-14 PROCEDURE — 770002 HCHG ROOM/CARE - OB PRIVATE (112)

## 2021-02-14 RX ORDER — IBUPROFEN 600 MG/1
600 TABLET ORAL EVERY 6 HOURS PRN
Status: DISCONTINUED | OUTPATIENT
Start: 2021-02-14 | End: 2021-02-15 | Stop reason: HOSPADM

## 2021-02-14 RX ADMIN — SIMETHICONE 80 MG: 80 TABLET, CHEWABLE ORAL at 22:07

## 2021-02-14 RX ADMIN — METFORMIN HYDROCHLORIDE 1000 MG: 500 TABLET ORAL at 07:52

## 2021-02-14 RX ADMIN — IBUPROFEN 600 MG: 600 TABLET, FILM COATED ORAL at 22:07

## 2021-02-14 RX ADMIN — OXYCODONE AND ACETAMINOPHEN 1 TABLET: 5; 325 TABLET ORAL at 15:02

## 2021-02-14 RX ADMIN — METFORMIN HYDROCHLORIDE 1000 MG: 500 TABLET ORAL at 16:53

## 2021-02-14 RX ADMIN — OXYCODONE AND ACETAMINOPHEN 1 TABLET: 5; 325 TABLET ORAL at 00:14

## 2021-02-14 RX ADMIN — OXYCODONE AND ACETAMINOPHEN 1 TABLET: 5; 325 TABLET ORAL at 22:07

## 2021-02-14 RX ADMIN — SIMETHICONE 80 MG: 80 TABLET, CHEWABLE ORAL at 08:12

## 2021-02-14 RX ADMIN — OXYCODONE AND ACETAMINOPHEN 1 TABLET: 5; 325 TABLET ORAL at 08:48

## 2021-02-14 RX ADMIN — IBUPROFEN 600 MG: 600 TABLET, FILM COATED ORAL at 07:57

## 2021-02-14 RX ADMIN — IBUPROFEN 600 MG: 600 TABLET, FILM COATED ORAL at 14:58

## 2021-02-14 RX ADMIN — ONDANSETRON 4 MG: 4 TABLET, ORALLY DISINTEGRATING ORAL at 02:03

## 2021-02-14 ASSESSMENT — PAIN DESCRIPTION - PAIN TYPE
TYPE: SURGICAL PAIN
TYPE: ACUTE PAIN
TYPE: SURGICAL PAIN

## 2021-02-14 NOTE — PROGRESS NOTES
Progress Note    Stefania Renteria   Post-op day 3  Chief Admitting Dx: Indication for care in labor or delivery [O75.9]  Delivery Type:        Subjective:  Ambulating: voiding, tolerating diet, normal lochia. Threw up once this am early per pt due to pain meds. No further vomiting after. Pain much better controlled.     Baby lost weight so trying to supplement as well as pump    Vitals:    21 1940 21 0600 21 1800 21 0600   BP: 128/79 126/74 137/69 111/73   Pulse: 94 83 82 91   Resp: 16 18 18 18   Temp: 36.7 °C (98 °F) 36.6 °C (97.8 °F) 36.9 °C (98.4 °F) 36.7 °C (98 °F)   TempSrc: Temporal Temporal Temporal Temporal   SpO2: 97% 93% 98% 97%   Weight:       Height:           Exam:  Gen: no distress  Abdomen: soft nt/nd firm fundus  Inc: c/d/i with preevena dressing    Mons pubis: now with more purple/bruised appearance  Ext: no calf pain mike LE  Labs:   No results found for this or any previous visit (from the past 24 hour(s)).    Assessment:  Pod 3  Plan:  Pain control - will add motrin back now  Work on pumping/feeding  Anticipate dc tomorrow  Type 2 DM on metfofmin ; has a continuous glucometer.     Marivel Obrien M.D.

## 2021-02-14 NOTE — PROGRESS NOTES
Discussed weight loss of infant with parents. Reviewed I&O chart. Infant only peed once since 0800, and had not stooled. Suggested thinking about supplementing at this time. Mother of infant wishes to pump at this time and see how much milk she can get.     Parents do not wish to supplement at this time. Parents educated on signs of dehydration. Parents pumped 13 mL and wish to feed to infant. Parents wish to wait until 0730 and see if they can pump 20 mL per feeding guidelines. If unable to pump 20 mL parents wish to begin supplementation.

## 2021-02-14 NOTE — CARE PLAN
Problem: Communication  Goal: The ability to communicate needs accurately and effectively will improve  Outcome: PROGRESSING AS EXPECTED  Note: Report given bedside. Pt updated on plan of care. Pt verbalizes understanding. Pt encouraged to voice needs and concerns. Communication board in use. Call light within reach. Pt calls appropriately. Hourly rounding in place. Pt has no needs or concerns at this time.       Problem: Altered physiologic condition related to postoperative  delivery  Goal: Patient physiologically stable as evidenced by normal lochia, palpable uterine involution and vital signs within normal limits  Outcome: PROGRESSING AS EXPECTED  Note: Fundus firm. Lochia light. Patient educated to call if saturating a pad in more than hour or for large clots.

## 2021-02-14 NOTE — LACTATION NOTE
This note was copied from a baby's chart.  1115-LC met with POB for follow-up appointment, MOB was pumping when LC arrived, after pumping approximately 10 ml colostrum noted, MOB has been pumping and supplementing after feedings due to weight loss and elevated bilirubin levels, baby has also had a decrease in diapers, baby was initially voiding and stooling frequently but per POB has had minimal voids or stools over the last 24 hours, current weight loss=9.09% (weight loss was 5.8% previously), education provided on expected urine and stool output and expected stool changes as maternal milk comes in, LC assisted MOB with latch during this meeting, baby was initially very fussy however after a few minutes latch was achieved and a nutritive suck was noted, MOB denies any discomfort while baby breastfeeds, after breastfeeding POB bottle fed 10 ml EBM to baby, POB instructed to feed additional 10 ml DBM to baby after    Breast milk storage guidelines provided    MOB has Spectra pump for home use, encouraged HG pump rental to help maximize milk supply and pump rental information provided    Supplement guidelines provided and explained    Verified continued understanding of proper pump use and settings    Written and verbal information provided on outpatient breastfeeding assistance [facility] after [facility] and encouraged to call to schedule consult as needed, informed that [facility] [place] is on hold for the time being, zoom meeting information provided as well    MOB denies having any additional questions or concerns for LC at this time    Encouraged to call for assistance as needed

## 2021-02-15 ENCOUNTER — PHARMACY VISIT (OUTPATIENT)
Dept: PHARMACY | Facility: MEDICAL CENTER | Age: 36
End: 2021-02-15
Payer: COMMERCIAL

## 2021-02-15 VITALS
OXYGEN SATURATION: 97 % | RESPIRATION RATE: 18 BRPM | SYSTOLIC BLOOD PRESSURE: 130 MMHG | BODY MASS INDEX: 37.54 KG/M2 | WEIGHT: 204 LBS | DIASTOLIC BLOOD PRESSURE: 73 MMHG | TEMPERATURE: 97.5 F | HEIGHT: 62 IN | HEART RATE: 81 BPM

## 2021-02-15 PROCEDURE — 700102 HCHG RX REV CODE 250 W/ 637 OVERRIDE(OP): Performed by: OBSTETRICS & GYNECOLOGY

## 2021-02-15 PROCEDURE — A9270 NON-COVERED ITEM OR SERVICE: HCPCS | Performed by: OBSTETRICS & GYNECOLOGY

## 2021-02-15 PROCEDURE — RXMED WILLOW AMBULATORY MEDICATION CHARGE: Performed by: OBSTETRICS & GYNECOLOGY

## 2021-02-15 RX ORDER — OXYCODONE HYDROCHLORIDE AND ACETAMINOPHEN 5; 325 MG/1; MG/1
1 TABLET ORAL EVERY 4 HOURS PRN
Qty: 30 TABLET | Refills: 0 | Status: SHIPPED | OUTPATIENT
Start: 2021-02-15 | End: 2021-02-20

## 2021-02-15 RX ORDER — IBUPROFEN 600 MG/1
600 TABLET ORAL EVERY 6 HOURS PRN
Qty: 30 TABLET | Refills: 1 | Status: SHIPPED | OUTPATIENT
Start: 2021-02-15 | End: 2021-12-01

## 2021-02-15 RX ADMIN — DOCUSATE SODIUM 100 MG: 100 CAPSULE, LIQUID FILLED ORAL at 08:20

## 2021-02-15 RX ADMIN — IBUPROFEN 600 MG: 600 TABLET, FILM COATED ORAL at 06:36

## 2021-02-15 RX ADMIN — SIMETHICONE 80 MG: 80 TABLET, CHEWABLE ORAL at 06:36

## 2021-02-15 RX ADMIN — OXYCODONE AND ACETAMINOPHEN 1 TABLET: 5; 325 TABLET ORAL at 11:52

## 2021-02-15 RX ADMIN — IBUPROFEN 600 MG: 600 TABLET, FILM COATED ORAL at 11:53

## 2021-02-15 RX ADMIN — OXYCODONE AND ACETAMINOPHEN 1 TABLET: 5; 325 TABLET ORAL at 06:36

## 2021-02-15 RX ADMIN — METFORMIN HYDROCHLORIDE 1000 MG: 500 TABLET ORAL at 08:20

## 2021-02-15 NOTE — PROGRESS NOTES
Report received from Kush MELARA @ the change of shift.  Assumed care. Discussed plan of care especially on managing pain. Assessment done. Encouraged to call if with needs. Will check at intervals.

## 2021-02-15 NOTE — DISCHARGE PLANNING
Meds-to-Beds: Discharge prescription orders listed below delivered to patient's bedside. SARITHA Mosquera notified. Patient counseled.  Patient elected to have co-payment billed to patient account.      Stefania Renteria   Home Medication Instructions JERRELL:[id number]    Printed on:02/15/21 1136   Medication Information                      ibuprofen (MOTRIN) 600 MG Tab  Take 1 tablet by mouth every 6 hours as needed for Moderate Pain.             oxyCODONE-acetaminophen (PERCOCET) 5-325 MG Tab  Take 1 tablet by mouth every four hours as needed for up to 5 days.                 Pearl Allen, PharmD

## 2021-02-15 NOTE — CONSULTS
Chief Complaint   Patient presents with   • Medicare Wellness Visit     Subseq    • Convey Results   looking for results of echo by Dr Olivares  Had to cancle vacation due to medical procedure, seeking refunds from insurance   OTC medication questions, instructed to d/c calcium  And tylenol   Wondering if needing colonoscopy     Denies known Latex allergy or symptoms of Latex sensitivity.  Medications reviewed and updated.  Social History     Tobacco Use   Smoking Status Never Smoker   Smokeless Tobacco Never Used     Patient would like communication of their results via:        Cell Phone:   Telephone Information:   Mobile [phone]     Okay to leave a message containing results? Yes  Health Maintenance Due   Topic Date Due   • Shingles Vaccine (2 of 3) 04/05/2009   • Medicare Wellness 65+  11/13/2018   • Depression Screening  11/13/2018     Patient is up to date, no discussion needed..         See lactation note

## 2021-02-15 NOTE — LACTATION NOTE
This note was copied from a baby's chart.  Baby 37 weeks, , weight loss 10%, MOB Hx Type 2 DM, C/S, couplet to be discharged today.     MOB on 3 step plan:  1. Breastfeed   2. Supplement according to Supplemental Guidelines 10-20-30 -given with review  3. Pump & hand express  Every 3 hours or sooner if baby cues    Parents getting ready to go, latch not seen. Reviewed 3 step plan and volumes to feed. FOB picked-up HG pump through [facility] already. Parents packed-up pump kit parts, MOB comfortable with pump settings, currently yielding 13 ml of colostrum. Encouraged mother to watch [employer] hand expression video, quick demo provided by JUSTIN.  Encouraged parents to call and make OP lactation appointment with [facility] for Thursday this week.

## 2021-02-15 NOTE — DISCHARGE INSTRUCTIONS
POSTPARTUM DISCHARGE INSTRUCTIONS FOR MOM    YOB: 1985   Age: 35 y.o.               Admit Date: 2/11/2021     Discharge Date: 2/15/2021  Attending Doctor:  Jerry Ruiz M.D.                  Allergies:  Patient has no known allergies.    Discharged to home by car. Discharged via wheelchair, hospital escort: Yes.  Special equipment needed: Not Applicable  Belongings with: Personal  Be sure to schedule a follow-up appointment with your primary care doctor or any specialists as instructed.     Discharge Plan:   Activity Level: Discussed  Confirmed Follow up Appointment: Patient to Call and Schedule Appointment  Medication Reconciliation Updated: Yes  Influenza Vaccine Indication: Not indicated: Previously immunized this influenza season and > 8 years of age      QUIT SMOKING/TOBACCO USE:  I understand the use of any tobacco products increases my chance of suffering from future heart disease and could cause other illnesses which may shorten my life. Quitting the use of tobacco products is the single most important thing I can do to improve my health. For further information on smoking / tobacco cessation call a Toll Free Quit Line at 1-170.541.2900 (*[employer] [place]) or 1-588.283.6748 (American Lung Association) or you can access the web based program at www.lungusa.org.    · [place] Tobacco Users Help Line:  (483) 982-7943       Toll Free: 1-808.228.2102  · Quit Tobacco Program [facility] (151)361-9791    DEPRESSION / SUICIDE RISK:  As [facility] are [facility], it is important to learn how to keep safe from harming yourself.    Recognize the warning signs:  · Abrupt changes in personality, positive or negative- including increase in energy   · Giving away possessions  · Change in eating patterns- significant weight changes-  positive or negative  · Change in sleeping patterns- unable to sleep or sleeping all the time   · Unwillingness or inability  to communicate  · Depression  · Unusual sadness, discouragement and loneliness  · Talk of wanting to die  · Neglect of personal appearance   · Rebelliousness- reckless behavior  · Withdrawal from people/activities they love  · Confusion- inability to concentrate     If you or a loved one observes any of these behaviors or has concerns about self-harm, here's what you can do:  · Talk about it- your feelings and reasons for harming yourself  · Remove any means that you might use to hurt yourself (examples: pills, rope, extension cords, firearm)  · Get professional help from the community (Mental Health, Substance Abuse, psychological counseling)  · Do not be alone:Call your Safe Contact- someone whom you trust who will be there for you.  · Call your local CRISIS HOTLINE 206-5237 or 198-686-5917  · Call your local Children's Mobile Crisis Response Team [place] (813) 016-9572 or www.[employer]  · Call the toll free National Suicide Prevention Hotlines   · National Suicide Prevention Lifeline 252-844-TLVP (4868)  · National Hope Line Network 800-SUICIDE (332-2435)    DISCHARGE SURVEY:  [facility]  We hope we provided you with very good care.  You may be receiving a survey in the mail.  Please fill it out.  Your opinion is valuable to us.    ADDITIONAL EDUCATIONAL MATERIALS GIVEN TO PATIENT:        My signature on this form indicates that:  1.  I have reviewed and understand the above information  2.  My questions regarding this information have been answered to my satisfaction.  3.  I have formulated a plan with my discharge nurse to obtain my prescribed medication for home.

## 2021-02-15 NOTE — DISCHARGE SUMMARY
DATE OF ADMISSION:  2021   DATE OF DISCHARGE:  02/15/2021     ADMISSION DIAGNOSES:  1.  Intrauterine pregnancy at 38 and 1/7th weeks.  2.  Type 2 diabetes.  3.  Breech presentation.     DISCHARGE DIAGNOSES:  1.  Intrauterine pregnancy at 38 and 1/7th weeks.  2.  Type 2 diabetes.  3.  Breech presentation.  4.  Status post primary  section.     This is a private patient of Dr. Chappell.     HOSPITAL COURSE:  She was admitted and underwent primary  section,   uncomplicated.  She had some problems with some bleeding coming from her   incisional site the first 2 days.  The incisional dressing was changed on two   different occasions and was still showing to be oozing.  Postop day #3, Dr. Obrien was able to extrude a clot from the incision and then placed a Prevena   dressing over that and the incision has since then been stable.  She is   postoperative day #4 and stable to be discharged home.  Her postoperative   hemoglobin is 9.8.  She is tolerating a regular diet, ambulating and urinating   without difficulty.  Her blood sugars have been checked and they have been   stable anywhere between 70 and 133.  She has her instructions for her diabetes   medications.  I have written prescriptions for Motrin as well as Percocet.    She has her instructions for the Prevena but knows that if she has any   complications with that dressing, she is to call our office sooner.        ______________________________  Corinne E. Capurro, MD [facility]    DD:  02/15/2021 06:24  DT:  02/15/2021 06:52    Job#:  [id number]

## 2021-02-15 NOTE — PROGRESS NOTES
Discharge instruction for mom and baby discussed. Prescription given and explained by Pharmacist. Emphasized the importance of  screening follow-up test. Questions and concerns have been answered. Baby's ID band matches with MOB.

## 2021-02-18 ENCOUNTER — OFFICE VISIT (OUTPATIENT)
Dept: OBGYN | Facility: CLINIC | Age: 36
End: 2021-02-18
Payer: COMMERCIAL

## 2021-02-18 DIAGNOSIS — O92.5 LACTATION SUPPRESSION: ICD-10-CM

## 2021-02-18 DIAGNOSIS — O92.70 LACTATION PROBLEM: ICD-10-CM

## 2021-02-18 PROCEDURE — G2212 PROLONG OUTPT/OFFICE VIS: HCPCS | Performed by: NURSE PRACTITIONER

## 2021-02-18 PROCEDURE — 99205 OFFICE O/P NEW HI 60 MIN: CPT | Performed by: NURSE PRACTITIONER

## 2021-02-18 NOTE — PROGRESS NOTES
Summary: Diligently trying to breastfeed and make milk.  Difficult start in the hospital with weight loss at 10% and drops of milk expressed. Pumps 8x in 24 hours, last pump got 60ml the most so far at one pump so increase has been seen from 30ml.  Right much more productive than left, asymmetrical similar to output.  Today latched baby in football keeping her awake but only removed 9ml total then pumped additional 25ml, removing 26% of available milk, showing some effectiveness. Plan discussed to get mom some sleep, keep pumping number, increase milk available to baby as desired, increase feeding frequency from 8/day to 9. Ped visit 21 and lactation before or after depending on parent needs to reassess.     Subjective:     Stefania Renteria is a 35 y.o. female here for lactation care. She is here today with baby and  (Yoel).    Concerns:   Latch on difficulties  and feeling that there is not enough milk   HPI:   Pertinent  history: c/section breech 38.1 weeks  Mother does not have a history of GDM, hypertension prior to pregnancy, multiple gestation and thyroid disease. Common condition(s) which may interfere with milk supply.    Mother does have advanced maternal age, insulin resistance, PCOS (cysts and insulin resistance only), IVF, wide spaced asymmetrical breasts, higher BMI, c/section. Common condition(s) that may interfere in milk supply.    Breast changes in pregnancy: Yes  History of breast surgeries: No      FEEDING HISTORY:    Past breastfeeding history:  First baby   Hospital course: Noted history. Weight loss at 9 then 10% loss, started pumping, hand expression and feeding. Rented HG pump for home use  Currently 21: Diligently trying to breastfeed and make milk.  Difficult start in the hospital with weight loss at 10% and drops of milk expressed. Pumps 8x in 24 hours, last pump got 60ml the most so far at one pump so increase has been seen from 30ml.   Both breasts: Yes  Bottle  feeds: 8x/24h    Supplement: Expressed breast milk and Formula  Quantity: 60ml  How given/devices:  Bottle    Nipple Shield Use: None    Breast Pumping:   Frequency: every 3h  Type of pump: Platinum, has the Spectra now and got the most milk one time  Flange size/type: 25mm  NO pain with pumping    Maternal ROS:  Constitutional: No fever, chills. Feeling well  Breasts: No soreness of breasts and No soreness of nipples  Psychiatric: Managing ok  Mental Health: No mention of feeling irritable, agitated, angry, overwhelmed, apathy, exhaustion nor having sleep changes outside infant feeds/demands or appetite changes     Objective:     Maternal Physical   General: No acute distress  Breasts: Asymmetric , Soft, Plugged Duct - no evidence and Mastitis  - no S/S  Nipples: intact  Psychiatric:  Normal mood and affect. Her behavior is normal. Judgment and thought content normal   Mental Health:  Did NOT exhibit sadness, crying, feeling overwhelmed, agitation or hypervigilance.     Assessment/Plan & Lactation Counseling:     Infant Weight History:   2/11/21 7#12.2  2/18/2021 7#5.1oz    Infant intake at Breast:: L 3ml     R 6ml    Total: 9ml  Milk Transfer at this feeding:   Ineffective breastfeeding; not able to transfer a full feed from breast r/t learning and supply 26% removed, great start  Pumped: Type of Pump: Spectra    Quantity Pumped:   Total: 25ml  Initiation of Feeding: Needed to be roused  Position of Feeding:    Right: football  Left: football  Attachment Achieved: with difficulty  Nipple shield: N/A       Suck Pattern at the breast: Suck burst and normal rest  Suck Pattern on the bottle: Not interested at this time  Behavior Following Observed Feeding: sleeping  Nipple Pain: None      Latch: Assisted latch  Suckling/Feeding: attaches, baby falls asleep, baby roots, elicits NARINDER, frequent pauses and not interested  Milk Supply Available: low building day 7, transitional milk  Low milk supply:   Likely due to:  maternal medical history, delay in lactogenesis II, ineffective or infrequent breast stimulation or milk removal and possible breast hypoplasia    Maternal Diagnosis/Problem:  Lactation suppression  Lactation problem, poor milk transfer    BREASTFEEDING PLAN  Discussed concerns and symptoms as listed above in assessment and guidance summarized below.  Topics reviewed included:    Herbs and medications for increasing supply and their potential side effects and efficacy. No evidence base exists to support their use  •  Triple feeding and its sustainability and its impact on the mother baby relationship  •  Maternal Mental Health: Good team, mom needs sleep, goal is mother's  •  Sleep or lack of and discussed strategies to manage restorative sleep, although short amounts, significant to the mental health of the mother.   • Https://www.[employer].com/watch?v=x7rJsxhRvKq&feature=tidyu.be&ab_channel=Infancy%26[facility]%2[place]  •  Self Care. Dad and moms mom with her, needs rest, getting out of the house each day, walk to the mailbox or drive somewhere,  a treat at a drive thru.    • Milk supply is dependent on glandular tissue development, hormonal influences, how many times the baby removes milk and how well the breasts are emptied in a 24 hour period. This is a biological reality that we can NOT work around. If, for any reason, your baby is not latching, or you are not able to nurse, then it is important for you to remove the milk instead by pumping or hand expression.  There's no magic trick, tea, food, drink, cookie or supplement that will increase your milk supply. One  must  effectively remove milk to continue to make and maximize milk. In the early days and weeks that can be 8+ times in 24 hours. For older babies, on average 6-7 + times in 24 hours.      • Low Milk Supply: Causes  o Insufficient breast development, wide spacing, , insulin resitance  o Lack of nipple/breast stimulation (Baby at the breast  but not suckling, mostly non nutritive sucking)  o Lack of breast emptying (Baby at the breast but no removing much milk)    •  Feeding:   o Growing baby well, managing well  o Guidelines:  - Feed your baby every 1.5-3 hours, more often if baby acts hungry.   - Awaken baby for feeding if going over 3 hours in the day.   - Until back to birth weight, ONE four hour at night is acceptable if has had 8 prior feedings in 24 hours.    Need to get in 8-12 feedings per 24 hours.      •  When bottle-feeding, there are three primary things to consider:    o Nipple Shape:  - Look for a nipple that looks like a “breast at work” not a “breast at rest.”  A “breast at work” has a somewhat cone shape as the nipple and breast tissue is pulled into the baby’s mouth while feeding.  Your baby’s mouth should be able to go around the widest part of the nipple to form a wide-open gape on the bottle like that of a good latch at the breast. In contrast, a breast at rest might look more like, well, a breast: a roundish base with a long skinny nipple.  If the bottle looks like this, your baby’s lips may not be able to get around the widest part of the nipple because it is just too wide resulting in a narrow gape that would hurt on your nipple. This nipple shape may also make it difficult for your baby to make a complete seal with their lips which leads to air intake and milk spillage.  o Flow Rate of the Nipple:  - The nipple flow should be slow.  Don’t just read the label, but notice how your baby is feeding and trust what you observe.  A study done a few years ago found that “slow flow” varied widely between brands and even between nipples of the same brand.  Try several nipples until you find one that results in a rhythmic sucking pattern but not chugging and gulping.  o Pacing the feeding:  - A slow flow nipple helps, but how you feed the baby is more important.  Good positioning can compensate for a faster flow nipple.  When  bottle-feeding, the baby should control how much is consumed at a feeding.  Holding the baby in an upright position with the bottle horizontal ensures that the baby gets milk only when sucking.  Here is a nice video demonstrating this concept of paced bottle feeding,  https://www.[employer].com/watch?v=CqPXG6cRF8V    •  Pacifier Use:  The [employer] Position Paper reports: Although we recommend a conservative approach regarding pacifier use, we do not endorse a complete ban on the use of pacifiers, nor do we support an approach that induces parental guilt concerning their choices about the use of pacifiers.    •  Supplement:   o Supplement with expressed milk    Supplement with formula    • Positioning Techniques for bare breast  o Suggested positions Football  o Fine tune position by making sure your fingers beneath the breast as well as your bra, are out of the way of your baby's chin.  o Positioning:  Many positions shown, great sidelying at 7 minutes.   o See http://globalhealthmedia.org/portfolio-items/positions-for-breastfeeding/?zholbgkmvMV=55304    o  Latch on Techniques for bare breast. Fine tune latch:  - By holding your baby more securely at the breast, assisting your baby to stay attached by:  - Bringing your baby to your breast, not breast to the baby  - Your baby's cheek to touch breast securely, nose tipped back  - Hold your baby firmly in place so when your baby forgets to suck and picks it back up again your baby is in the correct spot. You will be extinguishing behavior and replacing it with a deeper latch to stimulate suck and provide satisfaction at the breast  - Your baby needs as much breast as deep in the mouth as possible to allow your nipples to heal and for you more importantly to maximize efficiency at the breast  - Latch is asymmetrical, leading with the chin, getting more underneath.    •  Triple feeding: A short term solution: Breast/bottle/pump:  o FIRST decide what  "you can do at that feeding and you may decide to double feed -pump and bottle, if you are going to offer the breast at that feeding:  - nurse first and limit time on the breasts to 20+/- min total  - finish with bottle  - pump afterwards to finish emptying your breasts which will help increase milk supply  - use your own pumped milk, formula    •  Pumping Guidelines:  o Both breasts using \"Hands-on Pumping\" for 10-15 minutes to stimulate milk production.   - See video at : http://newborns.[facility]/Breastfeeding/MaxProduction.html.  o Pumping is effective but can quickly exhaust and overwhelm a new mother  o Pump 8x times in 24 hours  o Type of pump:  - Hospital grade, Platinum and Spectra  - http://www.[employer]/healthcare-professionals/videos/ameda-platinum-with-hygienikit-video  - Always double pump  - Introduce Spectra, reviewed settings  o How long will vary woman to woman, typically 8-15 minutes, or 1-2 minutes after flow slows  o Flange Fit: Freely moving nipple in the tunnel with some movement of the areola.  - Today's evaluation indicates appropriate flange    •  Increasing supply besides Galactogogues and Pumping:   o Warmth  o Relaxation   o Physical, auditory narratives  o Childbirth relaxation Techniques  o Acupuncture and acupressure  o Shoulder Massages  o Take a nap    •  Connect with other mothers:  o Facebook:   - [place] Breastfeeds: https://[email or web address]/[place].breastfeeds/  - Well-Nourished Babies (Private group for questions and support): https://www.facebook.com/groups/038022967484225/  o Breastfeeding [place]   - This is an emotional, informational and safe support [facility] [place] with your like-minded community that builds solidarity among moms  - The honest experiences of mothers are highly valued among the other mothers  - Tuesday  at 11am by Zoom,  you will be sent an invitation.   - You may instead copy and paste this link:    " [email or web address].us/[email or web address]    - Passcode  [id number]  - You may share this link with friends; please don't post on social media.    Follow up requires close monitoring in this time sensitive window of opportunity to establish milk supply and facilitate the learning of  breastfeeding.    Mom is encouraged to mychart to update how the plan is working.    Pediatrician appointment: Next Thursday 2/25/21    Follow-up for infant weight check and dyad breastfeeding evaluation in 8-12 day(s)  Please call [phone] if you have not scheduled your next appointment    A total of 90 minutes, not including infant assessment time, with more than 50% was spent preparing to see the patient, obtaining and reviewing separately obtained history, performing a medically appropriate examination and evaluation, counseling and educating the family, documenting clinical information in the electronic health record, independently interpreting weighted feeds and infant growth results, communicating these results to the family and care coordination as detailed in the above note.       PLEASE NOTE: Some of this note was created using voice recognition software. I have made every reasonable attempt to correct obvious errors, but I expect that there may be errors of grammar and possibly content that I did not discover prior finalizing this note.  PRICILLA Johnson.

## 2021-02-24 ENCOUNTER — NON-PROVIDER VISIT (OUTPATIENT)
Dept: WOUND CARE | Facility: MEDICAL CENTER | Age: 36
End: 2021-02-24
Attending: OBSTETRICS & GYNECOLOGY
Payer: COMMERCIAL

## 2021-02-24 DIAGNOSIS — T14.8XXA HEMATOMA: ICD-10-CM

## 2021-02-24 DIAGNOSIS — R52 PAIN ASSOCIATED WITH WOUND: ICD-10-CM

## 2021-02-24 DIAGNOSIS — E11.641 TYPE 2 DIABETES MELLITUS WITH HYPOGLYCEMIA AND COMA, WITH LONG-TERM CURRENT USE OF INSULIN (HCC): ICD-10-CM

## 2021-02-24 DIAGNOSIS — T81.31XD DEHISCENCE OF OPERATIVE WOUND, SUBSEQUENT ENCOUNTER: ICD-10-CM

## 2021-02-24 DIAGNOSIS — Z79.4 TYPE 2 DIABETES MELLITUS WITH HYPOGLYCEMIA AND COMA, WITH LONG-TERM CURRENT USE OF INSULIN (HCC): ICD-10-CM

## 2021-02-24 DIAGNOSIS — T14.8XXA PAIN ASSOCIATED WITH WOUND: ICD-10-CM

## 2021-02-24 PROCEDURE — 10140 I&D HMTMA SEROMA/FLUID COLLJ: CPT | Performed by: NURSE PRACTITIONER

## 2021-02-24 PROCEDURE — 10140 I&D HMTMA SEROMA/FLUID COLLJ: CPT

## 2021-02-24 PROCEDURE — 10060 I&D ABSCESS SIMPLE/SINGLE: CPT | Performed by: NURSE PRACTITIONER

## 2021-02-24 PROCEDURE — 11045 DBRDMT SUBQ TISS EACH ADDL: CPT

## 2021-02-24 PROCEDURE — 11042 DBRDMT SUBQ TIS 1ST 20SQCM/<: CPT

## 2021-02-24 PROCEDURE — 99215 OFFICE O/P EST HI 40 MIN: CPT

## 2021-02-24 PROCEDURE — 99214 OFFICE O/P EST MOD 30 MIN: CPT | Mod: 25 | Performed by: NURSE PRACTITIONER

## 2021-02-24 ASSESSMENT — ENCOUNTER SYMPTOMS
CHILLS: 0
COUGH: 0
CONSTIPATION: 0
FEVER: 0
DIARRHEA: 0
NAUSEA: 0
SHORTNESS OF BREATH: 0
VOMITING: 0

## 2021-02-25 NOTE — NON-PROVIDER
Wound 21 Incision Abdomen Lower (Active)       Wound 21 Incision Abdomen Lower Abdomen, lower,  incision, open (Active)   Wound Image    21   Site Assessment Red 21   Periwound Assessment Dry;Intact;Clean 21   Margins Unattached edges 21   Closure Secondary intention 21   Drainage Amount Small 21   Drainage Description Brown 21   Treatments Cleansed;Provider debridement;Injectible Lidocaine;Other (Comment) 21   Wound Cleansing Hydrogen Peroxide 21   Periwound Protectant Barrier Paste;Skin Protectant Wipes to Periwound 21   Dressing Cleansing/Solutions Normal Saline;Hydrogen Peroxide 21   Dressing Options Hydrofiber Silver;Super Absorbent Pad;Hypafix Tape 21   Dressing Changed New 21   Dressing Status Clean;Dry;Intact 21   Non-staged Wound Description Full thickness 21   Wound Length (cm) 0.2 cm 21   Wound Width (cm) 1.5 cm 21   Wound Depth (cm) 2.8 cm 21   Wound Surface Area (cm^2) 0.3 cm^2 21   Wound Volume (cm^3) 0.84 cm^3 21   Post-Procedure Length (cm) 2 cm 21   Post-Procedure Width (cm) 13 cm 21   Post-Procedure Depth (cm) 3 cm 21   Post-Procedure Surface Area (cm^2) 26 cm^2 21   Post-Procedure Volume (cm^3) 78 cm^3 21   Wound Bed Granulation (%) 0 % 21   Wound Bed Granulation (%) - Post-Procedure 0 % 21   Wound Bed Slough % - (Post-Procedure) 10 % 21   Tunneling (cm) 0 cm 21   Undermining (cm) 9 cm 02/24/21 1700   Undermining of Wound, 1st Location From 8 o'clock;To 10 o'clock 21 1700   Undermining (cm) - 2nd location 5 cm 210   Undermining of Wound, 2nd Location From 2 o'clock;To 3 o'clock 21   Wound Odor None 21   Exposed  Structures Adipose 02/24/21 0460

## 2021-02-25 NOTE — PATIENT INSTRUCTIONS
Should you experience any significant changes in your wound(s) such as infection (redness, swelling, localized heat, increased pain, fever >101 F, chills) or have any questions regarding your home care instructions, please contact the wound center [phone]. If after hours, contact your primary care physician or go the hospital emergency room.  Keep dressing clean and dry and cover while bathing. Only change dressing if over saturated, soiled or its falling off.       Dressing change procedure -   Remove old dressing.  Cleanse the wound with saline and gauze.  Dry surrounding skin with gauze, then apply skin prep wipe, allow to dry.  Cut a small piece of Aquacel silver dressing and pack loosely into wound to fill the dead space. Cut a larger piece of Aquacel silver and place this on top.   Cover with foam dressing and secure with the hypafix tape.

## 2021-02-25 NOTE — PROGRESS NOTES
Provider Encounter- Full Thickness wound    HISTORY OF PRESENT ILLNESS  Wound History:    START OF CARE IN CLINIC: 2021    REFERRING PROVIDER: Jerry Ruiz M.D.     WOUND- Full Thickness Wound   LOCATION: Lower abdomen   HISTORY: Patient underwent a  on 2021.  Postoperatively she had oozing of blood from incision.  On postop day #3 a clot was removed, and a Prevena dressing placed along the incision.  She was discharged home the following day.  Unfortunately, she continued to have oozing from her incision.  She was referred to [facility] for management of her wound.    Pertinent Medical History: Diabetes mellitus type 2, obesity    TOBACCO USE: She has never smoked or use smokeless tobacco    Patient's problem list, allergies, and current medications reviewed and updated in Epic    Interval History:  2021 : Initial clinic visit with MARTHA Vanessa, JOSEF-BC, CELINEN, CFOLY.  Patient was scheduled for nursing visit only, however wound RN noted tracked under entire incision, and obvious hematoma.  I was asked to see patient for further evaluation.  Incision fragile, clotted blood coming from partial dehiscence.  Recommended I&D of hematoma, risks and benefits discussed with patient and her , consent signed.  Patient has been taking ibuprofen for pain, though none in the past 24 hours.        REVIEW OF SYSTEMS:   Review of Systems   Constitutional: Negative for chills and fever.   Respiratory: Negative for cough and shortness of breath.    Cardiovascular: Negative for chest pain.   Gastrointestinal: Negative for constipation, diarrhea, nausea and vomiting.   Genitourinary: Negative for dysuria.       PHYSICAL EXAMINATION:   There were no vitals taken for this visit.    Physical Exam   Constitutional: She is oriented to person, place, and time and well-developed, well-nourished, and in no distress.   Obese   HENT:   Head: Normocephalic.   Eyes: Pupils are equal, round, and reactive to light.    Pulmonary/Chest: Effort normal.   Abdominal: Soft. There is abdominal tenderness.   Ecchymosis around low abdominal incision, tenderness with palpation   Musculoskeletal:         General: Normal range of motion.   Neurological: She is alert and oriented to person, place, and time.   Skin: Skin is warm.   Full-thickness wound (dehisced surgical wound) to low abdomen  Further wound flowsheet and photos   Psychiatric: Mood, memory, affect and judgment normal.       WOUND ASSESSMENT  Wound 21 Incision Abdomen Lower (Active)       Wound 21 Incision Abdomen Lower Abdomen, lower,  incision, open (Active)   Wound Image    21   Site Assessment Red 21   Periwound Assessment Dry;Intact;Clean 21   Margins Unattached edges 21   Closure Secondary intention 21   Drainage Amount Small 21   Drainage Description Brown 21   Treatments Cleansed;Provider debridement;Injectible Lidocaine;Other (Comment) 21   Wound Cleansing Hydrogen Peroxide 21   Periwound Protectant Barrier Paste;Skin Protectant Wipes to Periwound 21   Dressing Cleansing/Solutions Normal Saline;Hydrogen Peroxide 21   Dressing Options Hydrofiber Silver;Super Absorbent Pad;Hypafix Tape 21   Dressing Changed New 21   Dressing Status Clean;Dry;Intact 21   Non-staged Wound Description Full thickness 21   Wound Length (cm) 0.2 cm 21   Wound Width (cm) 1.5 cm 21   Wound Depth (cm) 2.8 cm 21   Wound Surface Area (cm^2) 0.3 cm^2 21 170   Wound Volume (cm^3) 0.84 cm^3 21   Post-Procedure Length (cm) 2 cm 21 170   Post-Procedure Width (cm) 13 cm 21   Post-Procedure Depth (cm) 3 cm 21   Post-Procedure Surface Area (cm^2) 26 cm^2 21 170   Post-Procedure Volume (cm^3) 78 cm^3 21 1700   Wound Bed Granulation  "(%) 0 % 21   Wound Bed Granulation (%) - Post-Procedure 0 % 21   Wound Bed Slough % - (Post-Procedure) 10 % 21   Tunneling (cm) 0 cm 21   Undermining (cm) 9 cm 21   Undermining of Wound, 1st Location From 8 o'clock;To 10 o'clock 21   Undermining (cm) - 2nd location 5 cm 21   Undermining of Wound, 2nd Location From 2 o'clock;To 3 o'clock 21 170   Wound Odor None 21   Exposed Structures Adipose 21      I and D    Date/Time: 2021 4:30 PM  Performed by: BIANCA LuceroPEPI.  Authorized by: PAPO Lucero   Time out: Immediately prior to procedure a \"time out\" was called to verify the correct patient, procedure, equipment, support staff and site/side marked as required.  Type: hematoma  Location: Lower abdomen.  Anesthesia: local infiltration    Anesthesia:  Local Anesthetic: lidocaine 2% with epinephrine  Anesthetic total: 20 mL    Sedation:  Patient sedated: no    Scalpel size: 15  Incision type: single straight  Incision depth: subcutaneous  Complexity: simple  Drainage: bloody (Large hematoma)  Drainage amount: moderate  Packing material: Aquacel Ag.  Patient tolerance: patient tolerated the procedure well with no immediate complications              Pertinent Labs and Diagnostics:    Labs:     A1c:   Lab Results   Component Value Date/Time    HBA1C 5.5 2020 07:33 AM          IMAGING: None found in epic    VASCULAR STUDIES: N/A    LAST  WOUND CULTURE:  DATE : None found in epic             ASSESSMENT AND PLAN:   1. Hematoma    2021: Patient presented for initial visit with obvious hematoma under  incision.  She was added to my schedule for evaluation.  Evacuation of hematoma recommended.  -I&D of hematoma using local anesthesia  -Hematoma evacuated, wound bed cleansed with dilute hydrogen peroxide, and then flushed with normal saline  -Wound packed with Aquacel Ag to " manage bleeding, exudate, and bioburden  -Given size of patient's wound, history of diabetes and obesity, it is my professional opinion that NPWT is necessary to accelerate healing of this wound, and to minimize risk of infection.  VAC ordered  -Patient to return to clinic in 2 days, will hopefully be able to apply VAC at that time.    2. Dehiscence of operative wound, subsequent encounter  Comments: Dehiscence of  wound, complicated by hematoma.  See above    3. Type 2 diabetes mellitus with hypoglycemia and coma, with long-term current use of insulin ([facility])    2021: Patient reports blood sugar this morning was around 100.  States she was on insulin during her pregnancy.  Most recent A1c found in epic is from 2020, 5.5.    4. Pain associated with wound    2021: Patient tolerated procedure today well with use of local anesthesia.  Advised that she may experience increased pain once lidocaine wears off.  -Advised to use OxyContin as prescribed by her OB/GYN  -May also use ibuprofen as recommended by OB/GYN      PATIENT EDUCATION  - Importance of adequate nutrition for wound healing  -Advised to go to ER for any increased redness, swelling, drainage, or odor, or if patient develops fever, chills, nausea or vomiting.     30 min spent face to face with patient, >50% of time spent counseling, coordinating care, reviewing records, discussing POC, educating patient regarding wound healing and progression.  This time was spent in excess to procedure time.       Please note that this note may have been created using voice recognition software. I have worked with technical experts from [employer] to optimize the interface.  I have made every reasonable attempt to correct obvious errors, but there may be errors of grammar and possibly content that I did not discover before finalizing the note.    N

## 2021-02-26 ENCOUNTER — NON-PROVIDER VISIT (OUTPATIENT)
Dept: WOUND CARE | Facility: MEDICAL CENTER | Age: 36
End: 2021-02-26
Attending: OBSTETRICS & GYNECOLOGY
Payer: COMMERCIAL

## 2021-02-26 DIAGNOSIS — T81.31XD DEHISCENCE OF OPERATIVE WOUND, SUBSEQUENT ENCOUNTER: ICD-10-CM

## 2021-02-26 PROCEDURE — 97605 NEG PRS WND THER DME<=50SQCM: CPT

## 2021-02-26 NOTE — PROCEDURES
Wound Care Procedures 2/26/2021:  Wound vac applied using one piece of black foam. Negative pressure therapy started at 125mmHg continuous, no leaks noted.    Discussed plan of care and rationale for wound vac; that battery lasts 6 hours and to always charge overnight (or plug in whenever at home); on trouble shooting, including making sure canister is seated properly and not cracked, that clamps are open, and signs of leakage including machine alarming and screen showing that pressure is not at 125 mm/Hg; to seal leaks by painting with Skin Prep and applying more drape tape to dressing if necessary; that if unable to fix leak for 2 hours, to remove all tape and foam and cover wound with moist gauze dressing, and to notify [facility] or Home Health during business hours; that wound vac will need to be changed three times per week; to bring supplies to each appointment; on 800 number for [employer] and 24 hour support.

## 2021-02-26 NOTE — PATIENT INSTRUCTIONS
-You have a wound vac at 125 mmHg continuous with black foam. The dressing will be changed every Monday, Wednesday, and Friday with your home health nurse or at the wound clinic.    -If you are having issues with your wound vac, please consider patching leaks, changing the canister, or calling [phone] for troubleshooting. If the wound vac has been off or non-operational for over 2 hours, call wound care center to inform them and remove all dressings including black foam and replace with gauze moistened with normal saline.     -Should you experience any significant changes in your wound, such as signs of infection (redness, swelling, localized heat, increased pain, fever > 101 F, chills) or have any questions regarding your home care instructions, please contact the wound center at [phone]. If after hours, contact your primary care physician or go to the hospital emergency room.

## 2021-02-27 NOTE — PROGRESS NOTES
Patient with full-thickness wound to lower abdomen,  incision, status post I&D of hematoma 2 days ago.  VAC initiated in clinic today.   Due to transportation issues, and the fact that she has a  infant at home, coming into clinic is difficult for her.  Requesting home health to assist with wound care.  Referral to home health placed.

## 2021-03-01 ENCOUNTER — NON-PROVIDER VISIT (OUTPATIENT)
Dept: WOUND CARE | Facility: MEDICAL CENTER | Age: 36
End: 2021-03-01
Attending: OBSTETRICS & GYNECOLOGY
Payer: COMMERCIAL

## 2021-03-01 PROCEDURE — 97605 NEG PRS WND THER DME<=50SQCM: CPT

## 2021-03-02 NOTE — PROCEDURES
NPWT resumed at 125 mmHg with no leaks noted. Bridged to left side and slightly up the abdomen per patient request. Patient tolerated well.

## 2021-03-03 ENCOUNTER — OFFICE VISIT (OUTPATIENT)
Dept: WOUND CARE | Facility: MEDICAL CENTER | Age: 36
End: 2021-03-03
Attending: OBSTETRICS & GYNECOLOGY
Payer: COMMERCIAL

## 2021-03-03 DIAGNOSIS — T81.31XD DEHISCENCE OF OPERATIVE WOUND, SUBSEQUENT ENCOUNTER: ICD-10-CM

## 2021-03-05 ENCOUNTER — OFFICE VISIT (OUTPATIENT)
Dept: WOUND CARE | Facility: MEDICAL CENTER | Age: 36
End: 2021-03-05
Attending: OBSTETRICS & GYNECOLOGY
Payer: COMMERCIAL

## 2021-03-05 VITALS
OXYGEN SATURATION: 96 % | SYSTOLIC BLOOD PRESSURE: 116 MMHG | RESPIRATION RATE: 20 BRPM | TEMPERATURE: 98.2 F | HEART RATE: 90 BPM | DIASTOLIC BLOOD PRESSURE: 69 MMHG

## 2021-03-05 DIAGNOSIS — E11.641 TYPE 2 DIABETES MELLITUS WITH HYPOGLYCEMIA AND COMA, WITH LONG-TERM CURRENT USE OF INSULIN (HCC): ICD-10-CM

## 2021-03-05 DIAGNOSIS — R52 PAIN ASSOCIATED WITH WOUND: ICD-10-CM

## 2021-03-05 DIAGNOSIS — T81.31XD DEHISCENCE OF OPERATIVE WOUND, SUBSEQUENT ENCOUNTER: Primary | ICD-10-CM

## 2021-03-05 DIAGNOSIS — T14.8XXA PAIN ASSOCIATED WITH WOUND: ICD-10-CM

## 2021-03-05 DIAGNOSIS — Z79.4 TYPE 2 DIABETES MELLITUS WITH HYPOGLYCEMIA AND COMA, WITH LONG-TERM CURRENT USE OF INSULIN (HCC): ICD-10-CM

## 2021-03-05 DIAGNOSIS — T14.8XXA HEMATOMA: ICD-10-CM

## 2021-03-05 PROCEDURE — 97605 NEG PRS WND THER DME<=50SQCM: CPT

## 2021-03-05 PROCEDURE — 11042 DBRDMT SUBQ TIS 1ST 20SQCM/<: CPT

## 2021-03-05 PROCEDURE — 11042 DBRDMT SUBQ TIS 1ST 20SQCM/<: CPT | Performed by: NURSE PRACTITIONER

## 2021-03-05 ASSESSMENT — ENCOUNTER SYMPTOMS
CONSTIPATION: 0
NAUSEA: 0
COUGH: 0
SHORTNESS OF BREATH: 0
FEVER: 0
CHILLS: 0
VOMITING: 0
DIARRHEA: 0

## 2021-03-05 ASSESSMENT — PAIN SCALES - GENERAL: PAIN_LEVEL: 3

## 2021-03-05 NOTE — PATIENT INSTRUCTIONS
....-Keep your wound dressing clean, dry, and intact.    -Wound vac may not have any drainage in tube or cannister & it will still be working.   Change cannister if it does become full by pressing tab on side of machine to remove canister and snap on new one. Full canister can be thrown in the trash. If cannister fills with bright red blood - go to ER. Dressing will be changed every Friday at the wound clinic and Monday/Wednesday by Home Health.  If you are having issues with your wound VAC, please consider patching leaks, changing the canister, or calling [phone] for troubleshooting. If the wound VAC has been off or un-operational for over 2 hours, call wound care center to inform them and remove all dressings including black foam and replace with normal saline damp gauze.       -Should you experience any significant changes in your wound(s), such as infection (redness, swelling, localized heat, increased pain, fever > 101 F, chills) or have any questions regarding your home care instructions, please contact the wound center at [phone]. If after hours, contact your primary care physician or go to the hospital emergency room.

## 2021-03-05 NOTE — PROGRESS NOTES
Provider Encounter- Full Thickness wound    HISTORY OF PRESENT ILLNESS  Wound History:    START OF CARE IN CLINIC: 2021    REFERRING PROVIDER: Jerry Ruiz M.D.     WOUND- Full Thickness Wound   LOCATION: Lower abdomen   HISTORY: Patient underwent a  on 2021.  Postoperatively she had oozing of blood from incision.  On postop day #3 a clot was removed, and a Prevena dressing placed along the incision.  She was discharged home the following day.  Unfortunately, she continued to have oozing from her incision.  She was referred to [facility] for management of her wound.    Pertinent Medical History: Diabetes mellitus type 2, obesity    TOBACCO USE: She has never smoked or use smokeless tobacco    Patient's problem list, allergies, and current medications reviewed and updated in Epic    Interval History:  2021 : Initial clinic visit with MARTHA Vanessa, JOSEF-BC, CELINEN, CFCN.  Patient was scheduled for nursing visit only, however wound RN noted tracked under entire incision, and obvious hematoma.  I was asked to see patient for further evaluation.  Incision fragile, clotted blood coming from partial dehiscence.  Recommended I&D of hematoma, risks and benefits discussed with patient and her , consent signed.  Patient has been taking ibuprofen for pain, though none in the past 24 hours.     3/5/2021: Clinic visit with MARTHA Hu. Patient states that they are feeling well today.  Patient denies fever, chills, nausea, vomiting, lightheadedness, dizziness, shortness of breath and chest pain.  Patient's wound is progressing nicely granulation tissue forming throughout wound bed.  Wound is decreasing in dimensions and depth.         REVIEW OF SYSTEMS:   Review of Systems   Constitutional: Negative for chills and fever.   Respiratory: Negative for cough and shortness of breath.    Cardiovascular: Negative for chest pain.   Gastrointestinal: Negative for constipation, diarrhea, nausea and  vomiting.   Genitourinary: Negative for dysuria.       PHYSICAL EXAMINATION:   /69   Pulse 90   Temp 36.8 °C (98.2 °F)   Resp 20   SpO2 96%     Physical Exam   Constitutional: She is oriented to person, place, and time and well-developed, well-nourished, and in no distress.   Obese   HENT:   Head: Normocephalic.   Eyes: Pupils are equal, round, and reactive to light.   Pulmonary/Chest: Effort normal.   Abdominal: Soft. There is abdominal tenderness.   Mild tenderness occasionally due to wound VAC   Musculoskeletal:         General: Normal range of motion.   Neurological: She is alert and oriented to person, place, and time.   Skin: Skin is warm.   Full-thickness wound (dehisced surgical wound) to low abdomen  See wound flowsheet and photos   Psychiatric: Mood, memory, affect and judgment normal.       WOUND ASSESSMENT       Negative Pressure Wound Therapy 21 Dehisced Abdomen Lower (Active)   NPWT Pump Mode / Pressure Setting 125 mmHg;Continuous 21   Dressing Type Black Foam (Regular) 21   Number of Foam Pieces Used 1 21   Canister Changed Yes 21           Wound 21 Incision Abdomen Lower Abdomen, lower,  incision, open (Active)   Wound Image    21   Site Assessment Red;Yellow 21   Periwound Assessment Intact 21   Margins Unattached edges 21   Closure Secondary intention 21   Drainage Amount Moderate 21   Drainage Description Serosanguineous 21   Treatments Cleansed;Topical Lidocaine;Provider debridement 21   Wound Cleansing Normal Saline Irrigation 21   Periwound Protectant Benzoin;Drape 21   Dressing Cleansing/Solutions Not Applicable 21   Dressing Options Wound Vac 21   Dressing Changed Changed 21   Dressing Status Clean;Dry;Intact 21   Dressing Change/Treatment Frequency Monday, Wednesday,  Friday, and As Needed 03/05/21 0800   Non-staged Wound Description Full thickness 03/05/21 0800   Wound Length (cm) 1.1 cm 03/05/21 0800   Wound Width (cm) 9.8 cm 03/05/21 0800   Wound Depth (cm) 1.5 cm 03/05/21 0800   Wound Surface Area (cm^2) 10.78 cm^2 03/05/21 0800   Wound Volume (cm^3) 16.17 cm^3 03/05/21 0800   Post-Procedure Length (cm) 1.1 cm 03/05/21 0800   Post-Procedure Width (cm) 11.2 cm 03/05/21 0800   Post-Procedure Depth (cm) 1.5 cm 03/05/21 0800   Post-Procedure Surface Area (cm^2) 12.32 cm^2 03/05/21 0800   Post-Procedure Volume (cm^3) 18.48 cm^3 03/05/21 0800   Wound Healing % -1825 03/05/21 0800   Wound Bed Granulation (%) 0 % 02/24/21 1700   Wound Bed Granulation (%) - Post-Procedure 0 % 02/24/21 1700   Wound Bed Slough % - (Post-Procedure) 10 % 02/24/21 1700   Tunneling (cm) 0 cm 03/05/21 0800   Undermining (cm) 0 cm 03/05/21 0800   Undermining of Wound, 1st Location From 8 o'clock;To 10 o'clock 02/24/21 1700   Undermining (cm) - 2nd location 5 cm 02/24/21 1700   Undermining of Wound, 2nd Location From 2 o'clock;To 3 o'clock 02/24/21 1700   Wound Odor None 03/05/21 0800   Exposed Structures None 03/05/21 0800                Debridement     Consent obtained? verbal  Consent given by: patient  Risks discussed? procedural risks discussed  Performed by: NP  Pain control: lidocaine 2%  Post-debridement measurements  Length (cm): 1.1  Width (cm): 9.8  Depth (cm): 1.5  Percent debrided: 100%  Surface Area (cm^2): 10.78  Area debrided (cm^2): 10.78  Volume (cm^3): 16.17  Tissue and other material debrided: subcutaneous tissue  Devitalized tissue debrided: slough  Instrument(s) utilized: curette  Bleeding: small  Hemostasis obtained with: pressure  Procedural pain (0-10): 0  Post-procedural pain: 3   Response to treatment: procedure was tolerated well              Pertinent Labs and Diagnostics:    Labs:     A1c:   Lab Results   Component Value Date/Time    HBA1C 5.5 03/26/2020 07:33 AM          IMAGING:  None found in epic    VASCULAR STUDIES: N/A    LAST  WOUND CULTURE:  DATE : None found in epic             ASSESSMENT AND PLAN:   1. Hematoma    21: Patient's wound is significantly improving following removal of hematoma by MARTHA Acevedo.  -Excisional debridement of wound in clinic today, medically necessary to promote wound healing.  -Patient to return to clinic weekly for assessment and debridement  -Patient to change dressing 1-2 times per week in between clinic visits   Wound VAC with black foam at 125 mmHg     2. Dehiscence of operative wound, subsequent encounter  Comments: Dehiscence of  wound, complicated by hematoma.  See above  -Patient's wound bed is filling with granulation tissue and decreasing in depth and other dimensions continue current wound VAC therapy.    3. Type 2 diabetes mellitus with hypoglycemia and coma, with long-term current use of insulin ([facility])    21:   States she was on insulin during her pregnancy.  Most recent A1c found in epic is from 2020, 5.5.    4. Pain associated with wound    21:  -2% viscous lidocaine applied topically to wound bed for approximately 5 minutes prior to debridement  -Patient tolerated procedure today with no complaints of discomfort.      PATIENT EDUCATION  - Importance of adequate nutrition for wound healing  -Advised to go to ER for any increased redness, swelling, drainage, or odor, or if patient develops fever, chills, nausea or vomiting.         Please note that this note may have been created using voice recognition software. I have worked with technical experts from [employer] to optimize the interface.  I have made every reasonable attempt to correct obvious errors, but there may be errors of grammar and possibly content that I did not discover before finalizing the note.    N

## 2021-03-08 ENCOUNTER — APPOINTMENT (OUTPATIENT)
Dept: WOUND CARE | Facility: MEDICAL CENTER | Age: 36
End: 2021-03-08
Attending: OBSTETRICS & GYNECOLOGY
Payer: COMMERCIAL

## 2021-03-10 ENCOUNTER — APPOINTMENT (OUTPATIENT)
Dept: WOUND CARE | Facility: MEDICAL CENTER | Age: 36
End: 2021-03-10
Attending: OBSTETRICS & GYNECOLOGY
Payer: COMMERCIAL

## 2021-03-12 ENCOUNTER — OFFICE VISIT (OUTPATIENT)
Dept: WOUND CARE | Facility: MEDICAL CENTER | Age: 36
End: 2021-03-12
Attending: OBSTETRICS & GYNECOLOGY
Payer: COMMERCIAL

## 2021-03-12 VITALS
TEMPERATURE: 97.6 F | OXYGEN SATURATION: 96 % | HEART RATE: 78 BPM | RESPIRATION RATE: 20 BRPM | DIASTOLIC BLOOD PRESSURE: 66 MMHG | SYSTOLIC BLOOD PRESSURE: 125 MMHG

## 2021-03-12 DIAGNOSIS — Z79.4 TYPE 2 DIABETES MELLITUS WITH HYPOGLYCEMIA AND COMA, WITH LONG-TERM CURRENT USE OF INSULIN (HCC): ICD-10-CM

## 2021-03-12 DIAGNOSIS — R52 PAIN ASSOCIATED WITH WOUND: ICD-10-CM

## 2021-03-12 DIAGNOSIS — E11.641 TYPE 2 DIABETES MELLITUS WITH HYPOGLYCEMIA AND COMA, WITH LONG-TERM CURRENT USE OF INSULIN (HCC): ICD-10-CM

## 2021-03-12 DIAGNOSIS — T14.8XXA PAIN ASSOCIATED WITH WOUND: ICD-10-CM

## 2021-03-12 PROCEDURE — 99213 OFFICE O/P EST LOW 20 MIN: CPT

## 2021-03-12 PROCEDURE — 99213 OFFICE O/P EST LOW 20 MIN: CPT | Performed by: NURSE PRACTITIONER

## 2021-03-12 ASSESSMENT — ENCOUNTER SYMPTOMS
SHORTNESS OF BREATH: 0
CONSTIPATION: 0
FEVER: 0
NAUSEA: 0
VOMITING: 0
DIARRHEA: 0
COUGH: 0
CHILLS: 0

## 2021-03-12 ASSESSMENT — PAIN SCALES - GENERAL: PAINLEVEL: NO PAIN

## 2021-03-13 NOTE — PROGRESS NOTES
Wound vac discontinued, advised patient to return wound vac to .    Wound care order faxed to [facility] at fax #[phone].

## 2021-03-13 NOTE — PROGRESS NOTES
Provider Encounter- Full Thickness wound    HISTORY OF PRESENT ILLNESS  Wound History:    START OF CARE IN CLINIC: 2021    REFERRING PROVIDER: Jerry Ruiz M.D.     WOUND- Full Thickness Wound   LOCATION: Lower abdomen   HISTORY: Patient underwent a  on 2021.  Postoperatively she had oozing of blood from incision.  On postop day #3 a clot was removed, and a Prevena dressing placed along the incision.  She was discharged home the following day.  Unfortunately, she continued to have oozing from her incision.  She was referred to [facility] for management of her wound.    Pertinent Medical History: Diabetes mellitus type 2, obesity    TOBACCO USE: She has never smoked or use smokeless tobacco    Patient's problem list, allergies, and current medications reviewed and updated in Epic    Interval History:  2021 : Initial clinic visit with MARTHA Vanessa, JOSEF-BC, CELINEN, CFOLY.  Patient was scheduled for nursing visit only, however wound RN noted tracked under entire incision, and obvious hematoma.  I was asked to see patient for further evaluation.  Incision fragile, clotted blood coming from partial dehiscence.  Recommended I&D of hematoma, risks and benefits discussed with patient and her , consent signed.  Patient has been taking ibuprofen for pain, though none in the past 24 hours.     3/5/2021: Clinic visit with MARTHA Hu. Patient states that they are feeling well today.  Patient denies fever, chills, nausea, vomiting, lightheadedness, dizziness, shortness of breath and chest pain.  Patient's wound is progressing nicely granulation tissue forming throughout wound bed.  Wound is decreasing in dimensions and depth.    3/12/2021: Clinic visit with Neda THOMAS. Pt denies fevers, chills, nausea, vomiting.   Patient is followed by [facility].  Reports wound VAC was held on Wednesday as wound depth has significantly decreased.  Patient denies any  pain.         REVIEW OF SYSTEMS:   Review of Systems   Constitutional: Negative for chills and fever.   Respiratory: Negative for cough and shortness of breath.    Cardiovascular: Negative for chest pain.   Gastrointestinal: Negative for constipation, diarrhea, nausea and vomiting.   Genitourinary: Negative for dysuria.       PHYSICAL EXAMINATION:   /66   Pulse 78   Temp 36.4 °C (97.6 °F)   Resp 20   SpO2 96%     Physical Exam   Constitutional: She is oriented to person, place, and time and well-developed, well-nourished, and in no distress.   Obese   HENT:   Head: Normocephalic.   Eyes: Pupils are equal, round, and reactive to light.   Pulmonary/Chest: Effort normal.   Abdominal: Soft. There is no abdominal tenderness.   Musculoskeletal:         General: Normal range of motion.   Neurological: She is alert and oriented to person, place, and time.   Skin: Skin is warm.   Full-thickness wound (dehisced surgical wound) to low abdomen  See wound flowsheet and photos   Psychiatric: Mood, memory, affect and judgment normal.       WOUND ASSESSMENT          Wound 21 Incision Abdomen Lower Abdomen, lower,  incision, open (Active)   Wound Image    21 1545   Site Assessment Pink 21 1545   Periwound Assessment Intact;Scar tissue 21 1545   Margins Attached edges 21 1545   Closure Secondary intention 21 0800   Drainage Amount Small 21 1545   Drainage Description Serosanguineous 21 1545   Treatments Cleansed 21 1545   Wound Cleansing Normal Saline Irrigation 21 1545   Periwound Protectant Skin Protectant Wipes to Periwound;Barrier Paste 21 1545   Dressing Cleansing/Solutions Not Applicable 21 1545   Dressing Options Hydrofera Blue Ready;Hypafix Tape 21 1545   Dressing Changed New 21 1545   Dressing Status Clean;Dry;Intact 21 0800   Dressing Change/Treatment Frequency Every 72 hrs, and As Needed 21 1545   Non-staged  Wound Description Full thickness 03/12/21 1545   Wound Length (cm) 0.4 cm 03/12/21 1545   Wound Width (cm) 5 cm 03/12/21 1545   Wound Depth (cm) 0.5 cm 03/12/21 1545   Wound Surface Area (cm^2) 2 cm^2 03/12/21 1545   Wound Volume (cm^3) 1 cm^3 03/12/21 1545   Post-Procedure Length (cm) 0.4 cm 03/12/21 1545   Post-Procedure Width (cm) 5 cm 03/12/21 1545   Post-Procedure Depth (cm) 0.5 cm 03/12/21 1545   Post-Procedure Surface Area (cm^2) 2 cm^2 03/12/21 1545   Post-Procedure Volume (cm^3) 1 cm^3 03/12/21 1545   Wound Healing % -19 03/12/21 1545   Wound Bed Granulation (%) 100 % 03/12/21 1545   Wound Bed Epithelium (%) 0 % 03/12/21 1545   Wound Bed Slough (%) 0 % 03/12/21 1545   Wound Bed Eschar (%) 0 % 03/12/21 1545   Wound Bed Granulation (%) - Post-Procedure 0 % 02/24/21 1700   Wound Bed Slough % - (Post-Procedure) 10 % 02/24/21 1700   Tunneling (cm) 0 cm 03/12/21 1545   Undermining (cm) 0 cm 03/12/21 1545   Undermining of Wound, 1st Location From 8 o'clock;To 10 o'clock 02/24/21 1700   Undermining (cm) - 2nd location 5 cm 02/24/21 1700   Undermining of Wound, 2nd Location From 2 o'clock;To 3 o'clock 02/24/21 1700   Wound Odor None 03/12/21 1545   Exposed Structures None 03/12/21 1545                           Pertinent Labs and Diagnostics:    Labs:     A1c:   Lab Results   Component Value Date/Time    HBA1C 5.5 03/26/2020 07:33 AM          IMAGING: None found in epic    VASCULAR STUDIES: N/A    LAST  WOUND CULTURE:  DATE : None found in epic             ASSESSMENT AND PLAN:   1. Hematoma    03/12/21: Patient's wound continues to improve following removal of hematoma by MARTHA Acevedo. VAC was held by  on 3/10  -wound bed red, granular. No debridement necessary today. Discontinue wound VAC. Pt to return.   --Patient to return to clinic weekly for assessment and debridement  -HH to change dressing 1 time per week in between clinic visits   Hydrofera blue, hypafix tape    2. Dehiscence of operative wound,  subsequent encounter  Comments: Dehiscence of  wound, complicated by hematoma.  See above  -Patient's wound bed is filling with granulation tissue and decreasing in depth and other dimensions continue current wound VAC therapy.    3. Type 2 diabetes mellitus with hypoglycemia and coma, with long-term current use of insulin ([facility])    21:   Blood sugar today 100. States she was on insulin during her pregnancy.  Most recent A1c found in epic is from 2020, 5.5.    4. Pain associated with wound    21:  Denies pain today. Wound continues to show signs of healing.    PATIENT EDUCATION  - Importance of adequate nutrition for wound healing  -Advised to go to ER for any increased redness, swelling, drainage, or odor, or if patient develops fever, chills, nausea or vomiting.     My total time spent caring for the patient on the day of the encounter was 20 minutes including reviewing of records, coordination of care, education as listed above.   This does not include time spent on separately billable procedures/tests.      Please note that this note may have been created using voice recognition software. I have worked with technical experts from [employer] to optimize the interface.  I have made every reasonable attempt to correct obvious errors, but there may be errors of grammar and possibly content that I did not discover before finalizing the note.    N

## 2021-03-13 NOTE — PATIENT INSTRUCTIONS
-Keep dressings clean and dry. Change dressings if they become over saturated, soiled or fall off. Otherwise, your home health nurse will change your dressings between wound clinic visits.    -Should you experience any significant changes in your wound, such as signs of infection (redness, swelling, localized heat, increased pain, fever > 101 F, chills) or have any questions regarding your home care instructions, please contact the wound center at [phone]. If after hours, contact your primary care physician or go to the hospital emergency room.

## 2021-03-19 ENCOUNTER — OFFICE VISIT (OUTPATIENT)
Dept: WOUND CARE | Facility: MEDICAL CENTER | Age: 36
End: 2021-03-19
Attending: OBSTETRICS & GYNECOLOGY
Payer: COMMERCIAL

## 2021-03-19 VITALS
DIASTOLIC BLOOD PRESSURE: 62 MMHG | RESPIRATION RATE: 20 BRPM | SYSTOLIC BLOOD PRESSURE: 117 MMHG | OXYGEN SATURATION: 98 % | HEART RATE: 92 BPM | TEMPERATURE: 97.8 F

## 2021-03-19 DIAGNOSIS — R52 PAIN ASSOCIATED WITH WOUND: ICD-10-CM

## 2021-03-19 DIAGNOSIS — T14.8XXA HEMATOMA: ICD-10-CM

## 2021-03-19 DIAGNOSIS — T14.8XXA PAIN ASSOCIATED WITH WOUND: ICD-10-CM

## 2021-03-19 DIAGNOSIS — E11.641 TYPE 2 DIABETES MELLITUS WITH HYPOGLYCEMIA AND COMA, WITH LONG-TERM CURRENT USE OF INSULIN (HCC): ICD-10-CM

## 2021-03-19 DIAGNOSIS — Z79.4 TYPE 2 DIABETES MELLITUS WITH HYPOGLYCEMIA AND COMA, WITH LONG-TERM CURRENT USE OF INSULIN (HCC): ICD-10-CM

## 2021-03-19 PROCEDURE — 99213 OFFICE O/P EST LOW 20 MIN: CPT

## 2021-03-19 PROCEDURE — 99212 OFFICE O/P EST SF 10 MIN: CPT | Performed by: NURSE PRACTITIONER

## 2021-03-19 ASSESSMENT — ENCOUNTER SYMPTOMS
WEAKNESS: 0
VOMITING: 0
DIARRHEA: 0
CHILLS: 0
SENSORY CHANGE: 0
DEPRESSION: 0
FALLS: 0
NAUSEA: 0
HEADACHES: 0
COUGH: 0
SHORTNESS OF BREATH: 0
NERVOUS/ANXIOUS: 0
CONSTIPATION: 0
SORE THROAT: 0
MYALGIAS: 0
FEVER: 0
PALPITATIONS: 0

## 2021-03-19 ASSESSMENT — PAIN SCALES - GENERAL: PAINLEVEL: NO PAIN

## 2021-03-19 ASSESSMENT — LIFESTYLE VARIABLES: SUBSTANCE_ABUSE: 0

## 2021-03-19 NOTE — PATIENT INSTRUCTIONS
- Resolved wound be fragile for a few days, bathe and dry area gently, only ever regains a maximum of 80% of the tensile strength of the surrounding skin, remodeling of scar can continue for 6mo - a year. Contact PCP for a referral back her if any problems with area opening and draining again.    -Should you experience any significant changes in your wound(s), such as infection (redness, swelling, localized heat, increased pain, fever > 101 F, chills) or have any questions regarding your home care instructions, please contact the wound center at [phone]. If after hours, contact your primary care physician or go to the hospital emergency room.

## 2021-03-19 NOTE — PROGRESS NOTES
Provider Encounter- Full Thickness wound    HISTORY OF PRESENT ILLNESS  Wound History:    START OF CARE IN CLINIC: 2021    REFERRING PROVIDER: Jerry Ruiz M.D.     WOUND- Full Thickness Wound   LOCATION: Lower abdomen   HISTORY: Patient underwent a  on 2021.  Postoperatively she had oozing of blood from incision.  On postop day #3 a clot was removed, and a Prevena dressing placed along the incision.  She was discharged home the following day.  Unfortunately, she continued to have oozing from her incision.  She was referred to [facility] for management of her wound.    Pertinent Medical History: Diabetes mellitus type 2, obesity    TOBACCO USE: She has never smoked or use smokeless tobacco    Patient's problem list, allergies, and current medications reviewed and updated in Epic    Interval History:  2021 : Initial clinic visit with MARTHA Vanessa, JOSEF-BC, CELINEN, CFOLY.  Patient was scheduled for nursing visit only, however wound RN noted tracked under entire incision, and obvious hematoma.  I was asked to see patient for further evaluation.  Incision fragile, clotted blood coming from partial dehiscence.  Recommended I&D of hematoma, risks and benefits discussed with patient and her , consent signed.  Patient has been taking ibuprofen for pain, though none in the past 24 hours.     3/5/2021: Clinic visit with MARTHA Hu. Patient states that they are feeling well today.  Patient denies fever, chills, nausea, vomiting, lightheadedness, dizziness, shortness of breath and chest pain.  Patient's wound is progressing nicely granulation tissue forming throughout wound bed.  Wound is decreasing in dimensions and depth.    3/12/2021: Clinic visit with Neda THOMAS. Pt denies fevers, chills, nausea, vomiting.   Patient is followed by [facility].  Reports wound VAC was held on Wednesday as wound depth has significantly decreased.  Patient denies any pain.    3/19/2021:  Clinic visit with MARTHA Gonzalez.  Patient reports feeling well.  Denies fever, chills, nausea, vomiting, chest pain, shortness of breath.  Wound is resolved. Patient states she has appointment with OB for her 6-week check next week.  Patient educated to refrain from heavy lifting and again weight lifting clearances from OB on her next appointment.  Additionally, patient educated to avoid friction to newly healed tissue.  FSBS 93 this morning.       REVIEW OF SYSTEMS:   Review of Systems   Constitutional: Negative for chills and fever.   HENT: Negative for congestion and sore throat.    Respiratory: Negative for cough and shortness of breath.    Cardiovascular: Negative for chest pain and palpitations.   Gastrointestinal: Negative for constipation, diarrhea, nausea and vomiting.   Genitourinary: Negative for dysuria.   Musculoskeletal: Negative for falls and myalgias.   Skin: Negative for itching and rash.   Neurological: Negative for sensory change, weakness and headaches.   Psychiatric/Behavioral: Negative for depression and substance abuse. The patient is not nervous/anxious.        PHYSICAL EXAMINATION:   /62   Pulse 92   Temp 36.6 °C (97.8 °F)   Resp 20   SpO2 98%     Physical Exam   Constitutional: She is oriented to person, place, and time and well-developed, well-nourished, and in no distress. No distress.   Obese female.   HENT:   Head: Normocephalic and atraumatic.   Eyes: Pupils are equal, round, and reactive to light. EOM are normal. Right eye exhibits no discharge. Left eye exhibits no discharge.   Neck: No tracheal deviation present.   Cardiovascular: Intact distal pulses.   Pulmonary/Chest: Effort normal. No respiratory distress.   Respirations even and unlabored.  Even chest rise and fall.   Abdominal: Soft. She exhibits no distension. There is no abdominal tenderness. There is no guarding.   Musculoskeletal:         General: No deformity or edema. Normal range of motion.      Cervical  back: Normal range of motion.   Neurological: She is alert and oriented to person, place, and time.   Skin: Skin is warm and dry.   Previous dehisced abdominal incision-resolved.  See wound flowsheet and photos   Psychiatric: Mood, memory, affect and judgment normal.       WOUND ASSESSMENT       Wound 21 Incision Abdomen Lower Abdomen, lower,  incision, open (Active)   Wound Image    21 154   Site Assessment Pink 21 154   Periwound Assessment Intact;Scar tissue 21 154   Margins Attached edges 21 154   Closure Secondary intention 21 08   Drainage Amount Small 21 154   Drainage Description Serosanguineous 21 154   Treatments Cleansed 21 154   Wound Cleansing Normal Saline Irrigation 21 154   Periwound Protectant Skin Protectant Wipes to Periwound;Barrier Paste 21 154   Dressing Cleansing/Solutions Not Applicable 21 154   Dressing Options Hydrofera Blue Ready;Hypafix Tape 21 154   Dressing Changed New 21 154   Dressing Status Clean;Dry;Intact 21 0800   Dressing Change/Treatment Frequency Every 72 hrs, and As Needed 21 154   Non-staged Wound Description Full thickness 21 154   Wound Length (cm) 0.4 cm 21 154   Wound Width (cm) 5 cm 21 154   Wound Depth (cm) 0.5 cm 21 154   Wound Surface Area (cm^2) 2 cm^2 21 154   Wound Volume (cm^3) 1 cm^3 21 1545   Post-Procedure Length (cm) 0.4 cm 21 1545   Post-Procedure Width (cm) 5 cm 21 154   Post-Procedure Depth (cm) 0.5 cm 21 154   Post-Procedure Surface Area (cm^2) 2 cm^2 21 154   Post-Procedure Volume (cm^3) 1 cm^3 21 154   Wound Healing % -19 21 154   Wound Bed Granulation (%) 100 % 21 1545   Wound Bed Epithelium (%) 0 % 21 154   Wound Bed Slough (%) 0 % 21 154   Wound Bed Eschar (%) 0 % 21   Wound Bed Granulation (%) - Post-Procedure 0 %  21 1700   Wound Bed Slough % - (Post-Procedure) 10 % 21 1700   Tunneling (cm) 0 cm 21 1545   Undermining (cm) 0 cm 21 1545   Undermining of Wound, 1st Location From 8 o'clock;To 10 o'clock 21 1700   Undermining (cm) - 2nd location 5 cm 21 1700   Undermining of Wound, 2nd Location From 2 o'clock;To 3 o'clock 21 1700   Wound Odor None 21 1545   Exposed Structures None 21 1545          Pertinent Labs and Diagnostics:    Labs:     A1c:   Lab Results   Component Value Date/Time    HBA1C 5.5 2020 07:33 AM          IMAGING: None found in epic    VASCULAR STUDIES: N/A    LAST  WOUND CULTURE:  DATE : None found in epic             ASSESSMENT AND PLAN:   1. Hematoma    21: Patient's wound continues to improve following removal of hematoma by MARTHA Acevedo. VAC was held by  on 3/10    3/19/2021: Resolved.   -Discharge from [facility]  -Patient to return to clinic ASAP if wound recurs, or if he/she develops new wounds    2. Dehiscence of operative wound, subsequent encounter  Comments: Dehiscence of  wound, complicated by hematoma.  See above    3/19/2021: Resolved.  -Discharge from [facility]  -Patient to return to clinic ASAP if wound recurs, or if he/she develops new wounds      3. Type 2 diabetes mellitus with hypoglycemia and coma, with long-term current use of insulin ([facility])    21: FSBS 93 this morning.  Most recent A1c found in epic is from 2020, 5.5.  -Patient encouraged to continue to manage blood glucose.  -Follow-up primary care provider    4. Pain associated with wound    3/19/2021: Dehisced surgical wound resolved, patient denies pain.  -Discharge from [facility]  -Patient to return to clinic ASAP if wound recurs, or if he/she develops new wounds      PATIENT EDUCATION  - Importance of adequate nutrition for wound healing  -Advised to go to ER for any increased redness, swelling, drainage, or odor, or if patient develops fever, chills, nausea or  vomiting.     My total time spent caring for the patient on the day of the encounter was 20 minutes including reviewing of records, coordination of care, education as listed above.   This does not include time spent on separately billable procedures/tests.      Please note that this note may have been created using voice recognition software. I have worked with technical experts from [facility] to optimize the interface.  I have made every reasonable attempt to correct obvious errors, but there may be errors of grammar and possibly content that I did not discover before finalizing the note.    N

## 2021-03-26 ENCOUNTER — APPOINTMENT (OUTPATIENT)
Dept: WOUND CARE | Facility: MEDICAL CENTER | Age: 36
End: 2021-03-26
Attending: OBSTETRICS & GYNECOLOGY
Payer: COMMERCIAL

## 2021-04-02 ENCOUNTER — APPOINTMENT (OUTPATIENT)
Dept: WOUND CARE | Facility: MEDICAL CENTER | Age: 36
End: 2021-04-02
Attending: OBSTETRICS & GYNECOLOGY
Payer: COMMERCIAL

## 2021-04-05 ENCOUNTER — TELEPHONE (OUTPATIENT)
Dept: ENDOCRINOLOGY | Facility: MEDICAL CENTER | Age: 36
End: 2021-04-05

## 2021-04-05 NOTE — TELEPHONE ENCOUNTER
Phone Number Called: [phone]    Call outcome: Spoke to patient regarding message below.    Message: Contacted patient and scheduled her for 07/09. Patient stated she did not know why we received a request for dexcom. She is not using a dexcom. I let her know I would let Dr. Oseguera know.

## 2021-04-05 NOTE — TELEPHONE ENCOUNTER
----- Message from Esvin Oseguera M.D. sent at 3/30/2021  5:01 PM PDT -----  I  wont sign her [employer] dexcom supplies - hasnt been seen in 11 months  Needs appt    ok with Patty

## 2021-04-09 ENCOUNTER — APPOINTMENT (OUTPATIENT)
Dept: WOUND CARE | Facility: MEDICAL CENTER | Age: 36
End: 2021-04-09
Attending: OBSTETRICS & GYNECOLOGY
Payer: COMMERCIAL

## 2021-04-16 ENCOUNTER — APPOINTMENT (OUTPATIENT)
Dept: WOUND CARE | Facility: MEDICAL CENTER | Age: 36
End: 2021-04-16
Attending: OBSTETRICS & GYNECOLOGY
Payer: COMMERCIAL

## 2021-04-23 ENCOUNTER — APPOINTMENT (OUTPATIENT)
Dept: WOUND CARE | Facility: MEDICAL CENTER | Age: 36
End: 2021-04-23
Attending: OBSTETRICS & GYNECOLOGY
Payer: COMMERCIAL

## 2021-04-30 ENCOUNTER — APPOINTMENT (OUTPATIENT)
Dept: WOUND CARE | Facility: MEDICAL CENTER | Age: 36
End: 2021-04-30
Attending: OBSTETRICS & GYNECOLOGY
Payer: COMMERCIAL

## 2021-08-09 DIAGNOSIS — E11.9 CONTROLLED TYPE 2 DIABETES MELLITUS WITHOUT COMPLICATION, WITHOUT LONG-TERM CURRENT USE OF INSULIN (HCC): ICD-10-CM

## 2021-09-27 DIAGNOSIS — E11.9 CONTROLLED TYPE 2 DIABETES MELLITUS WITHOUT COMPLICATION, WITHOUT LONG-TERM CURRENT USE OF INSULIN (HCC): ICD-10-CM

## 2021-10-20 DIAGNOSIS — E11.9 CONTROLLED TYPE 2 DIABETES MELLITUS WITHOUT COMPLICATION, WITHOUT LONG-TERM CURRENT USE OF INSULIN (HCC): ICD-10-CM

## 2021-10-20 NOTE — TELEPHONE ENCOUNTER
Received request via: Pharmacy    Was the patient seen in the last year in this department? Yes    Does the patient have an active prescription (recently filled or refills available) for medication(s) requested? No     REQUESTED MEDICATION:   Requested Prescriptions     Pending Prescriptions Disp Refills   • metformin (GLUCOPHAGE) 1000 MG tablet [Pharmacy Med Name: METFORMIN HCL 1,000 MG TABLET] 60 Tablet 0     Sig: TAKE 1 TABLET BY MOUTH TWICE A DAY

## 2021-11-20 DIAGNOSIS — E11.9 CONTROLLED TYPE 2 DIABETES MELLITUS WITHOUT COMPLICATION, WITHOUT LONG-TERM CURRENT USE OF INSULIN (HCC): ICD-10-CM

## 2021-11-22 NOTE — TELEPHONE ENCOUNTER
Received request via: Pharmacy    Was the patient seen in the last year in this department? No  HAS UPCOMNING APPOINTMENT 2/9/22     Does the patient have an active prescription (recently filled or refills available) for medication(s) requested? No     REQUESTED MEDICATION:   Requested Prescriptions     Pending Prescriptions Disp Refills   • metformin (GLUCOPHAGE) 1000 MG tablet [Pharmacy Med Name: METFORMIN HCL 1,000 MG TABLET] 60 Tablet 0     Sig: TAKE 1 TABLET BY MOUTH TWICE A DAY

## 2021-11-30 ENCOUNTER — HOSPITAL ENCOUNTER (EMERGENCY)
Facility: MEDICAL CENTER | Age: 36
End: 2021-11-30
Attending: EMERGENCY MEDICINE
Payer: COMMERCIAL

## 2021-11-30 VITALS
TEMPERATURE: 98.3 F | SYSTOLIC BLOOD PRESSURE: 110 MMHG | DIASTOLIC BLOOD PRESSURE: 78 MMHG | BODY MASS INDEX: 33.47 KG/M2 | HEIGHT: 62 IN | WEIGHT: 181.88 LBS | OXYGEN SATURATION: 98 % | RESPIRATION RATE: 16 BRPM | HEART RATE: 78 BPM

## 2021-11-30 DIAGNOSIS — R10.13 EPIGASTRIC PAIN: ICD-10-CM

## 2021-11-30 DIAGNOSIS — R10.13 EPIGASTRIC BURNING SENSATION: ICD-10-CM

## 2021-11-30 LAB
ALBUMIN SERPL BCP-MCNC: 4.3 G/DL (ref 3.2–4.9)
ALBUMIN/GLOB SERPL: 1.3 G/DL
ALP SERPL-CCNC: 146 U/L (ref 30–99)
ALT SERPL-CCNC: 32 U/L (ref 2–50)
ANION GAP SERPL CALC-SCNC: 15 MMOL/L (ref 7–16)
APPEARANCE UR: CLEAR
AST SERPL-CCNC: 17 U/L (ref 12–45)
BASOPHILS # BLD AUTO: 0.4 % (ref 0–1.8)
BASOPHILS # BLD: 0.04 K/UL (ref 0–0.12)
BILIRUB SERPL-MCNC: 0.4 MG/DL (ref 0.1–1.5)
BILIRUB UR QL STRIP.AUTO: NEGATIVE
BUN SERPL-MCNC: 14 MG/DL (ref 8–22)
CALCIUM SERPL-MCNC: 9.4 MG/DL (ref 8.4–10.2)
CHLORIDE SERPL-SCNC: 101 MMOL/L (ref 96–112)
CO2 SERPL-SCNC: 21 MMOL/L (ref 20–33)
COLOR UR: YELLOW
CREAT SERPL-MCNC: 0.53 MG/DL (ref 0.5–1.4)
EOSINOPHIL # BLD AUTO: 0.35 K/UL (ref 0–0.51)
EOSINOPHIL NFR BLD: 3.5 % (ref 0–6.9)
ERYTHROCYTE [DISTWIDTH] IN BLOOD BY AUTOMATED COUNT: 41.3 FL (ref 35.9–50)
GLOBULIN SER CALC-MCNC: 3.4 G/DL (ref 1.9–3.5)
GLUCOSE SERPL-MCNC: 133 MG/DL (ref 65–99)
GLUCOSE UR STRIP.AUTO-MCNC: NEGATIVE MG/DL
HCG SERPL QL: NEGATIVE
HCT VFR BLD AUTO: 39.9 % (ref 37–47)
HGB BLD-MCNC: 13.3 G/DL (ref 12–16)
IMM GRANULOCYTES # BLD AUTO: 0.08 K/UL (ref 0–0.11)
IMM GRANULOCYTES NFR BLD AUTO: 0.8 % (ref 0–0.9)
KETONES UR STRIP.AUTO-MCNC: NEGATIVE MG/DL
LEUKOCYTE ESTERASE UR QL STRIP.AUTO: NEGATIVE
LIPASE SERPL-CCNC: 22 U/L (ref 7–58)
LYMPHOCYTES # BLD AUTO: 3.24 K/UL (ref 1–4.8)
LYMPHOCYTES NFR BLD: 32.7 % (ref 22–41)
MCH RBC QN AUTO: 29.2 PG (ref 27–33)
MCHC RBC AUTO-ENTMCNC: 33.3 G/DL (ref 33.6–35)
MCV RBC AUTO: 87.5 FL (ref 81.4–97.8)
MICRO URNS: NORMAL
MONOCYTES # BLD AUTO: 0.75 K/UL (ref 0–0.85)
MONOCYTES NFR BLD AUTO: 7.6 % (ref 0–13.4)
NEUTROPHILS # BLD AUTO: 5.44 K/UL (ref 2–7.15)
NEUTROPHILS NFR BLD: 55 % (ref 44–72)
NITRITE UR QL STRIP.AUTO: NEGATIVE
NRBC # BLD AUTO: 0 K/UL
NRBC BLD-RTO: 0 /100 WBC
PH UR STRIP.AUTO: 6 [PH] (ref 5–8)
PLATELET # BLD AUTO: 295 K/UL (ref 164–446)
PMV BLD AUTO: 10.4 FL (ref 9–12.9)
POTASSIUM SERPL-SCNC: 4 MMOL/L (ref 3.6–5.5)
PROT SERPL-MCNC: 7.7 G/DL (ref 6–8.2)
PROT UR QL STRIP: NEGATIVE MG/DL
RBC # BLD AUTO: 4.56 M/UL (ref 4.2–5.4)
RBC UR QL AUTO: NEGATIVE
SODIUM SERPL-SCNC: 137 MMOL/L (ref 135–145)
SP GR UR STRIP.AUTO: 1.02
WBC # BLD AUTO: 9.9 K/UL (ref 4.8–10.8)

## 2021-11-30 PROCEDURE — 81003 URINALYSIS AUTO W/O SCOPE: CPT

## 2021-11-30 PROCEDURE — 85025 COMPLETE CBC W/AUTO DIFF WBC: CPT

## 2021-11-30 PROCEDURE — 700102 HCHG RX REV CODE 250 W/ 637 OVERRIDE(OP): Performed by: EMERGENCY MEDICINE

## 2021-11-30 PROCEDURE — 700111 HCHG RX REV CODE 636 W/ 250 OVERRIDE (IP): Performed by: EMERGENCY MEDICINE

## 2021-11-30 PROCEDURE — 84703 CHORIONIC GONADOTROPIN ASSAY: CPT

## 2021-11-30 PROCEDURE — 80053 COMPREHEN METABOLIC PANEL: CPT

## 2021-11-30 PROCEDURE — 99285 EMERGENCY DEPT VISIT HI MDM: CPT

## 2021-11-30 PROCEDURE — A9270 NON-COVERED ITEM OR SERVICE: HCPCS | Performed by: EMERGENCY MEDICINE

## 2021-11-30 PROCEDURE — 96375 TX/PRO/DX INJ NEW DRUG ADDON: CPT

## 2021-11-30 PROCEDURE — 96374 THER/PROPH/DIAG INJ IV PUSH: CPT

## 2021-11-30 PROCEDURE — 83690 ASSAY OF LIPASE: CPT

## 2021-11-30 RX ORDER — ONDANSETRON 4 MG/1
4 TABLET, ORALLY DISINTEGRATING ORAL EVERY 6 HOURS PRN
Qty: 10 TABLET | Refills: 0 | Status: SHIPPED | OUTPATIENT
Start: 2021-11-30 | End: 2022-06-14

## 2021-11-30 RX ORDER — ONDANSETRON 4 MG/1
4 TABLET, ORALLY DISINTEGRATING ORAL EVERY 6 HOURS PRN
Qty: 10 TABLET | Refills: 0 | Status: SHIPPED | OUTPATIENT
Start: 2021-11-30 | End: 2021-11-30 | Stop reason: SDUPTHER

## 2021-11-30 RX ORDER — SUCRALFATE 1 G/1
1 TABLET ORAL
Qty: 56 TABLET | Refills: 0 | Status: SHIPPED | OUTPATIENT
Start: 2021-11-30 | End: 2021-12-14

## 2021-11-30 RX ORDER — SUCRALFATE 1 G/1
1 TABLET ORAL
Qty: 56 TABLET | Refills: 0 | Status: SHIPPED | OUTPATIENT
Start: 2021-11-30 | End: 2021-11-30 | Stop reason: SDUPTHER

## 2021-11-30 RX ORDER — ONDANSETRON 2 MG/ML
4 INJECTION INTRAMUSCULAR; INTRAVENOUS ONCE
Status: COMPLETED | OUTPATIENT
Start: 2021-11-30 | End: 2021-11-30

## 2021-11-30 RX ORDER — MORPHINE SULFATE 4 MG/ML
4 INJECTION INTRAVENOUS ONCE
Status: DISCONTINUED | OUTPATIENT
Start: 2021-11-30 | End: 2021-11-30 | Stop reason: HOSPADM

## 2021-11-30 RX ORDER — FAMOTIDINE 20 MG/1
20 TABLET, FILM COATED ORAL 2 TIMES DAILY
Qty: 28 TABLET | Refills: 0 | Status: SHIPPED | OUTPATIENT
Start: 2021-11-30 | End: 2021-12-14

## 2021-11-30 RX ORDER — FAMOTIDINE 20 MG/1
20 TABLET, FILM COATED ORAL 2 TIMES DAILY
Qty: 28 TABLET | Refills: 0 | Status: SHIPPED | OUTPATIENT
Start: 2021-11-30 | End: 2021-11-30 | Stop reason: SDUPTHER

## 2021-11-30 RX ADMIN — ONDANSETRON 4 MG: 2 INJECTION INTRAMUSCULAR; INTRAVENOUS at 18:57

## 2021-11-30 RX ADMIN — FAMOTIDINE 20 MG: 10 INJECTION INTRAVENOUS at 18:57

## 2021-11-30 RX ADMIN — LIDOCAINE HYDROCHLORIDE 30 ML: 20 SOLUTION OROPHARYNGEAL at 18:57

## 2021-11-30 ASSESSMENT — PAIN DESCRIPTION - PAIN TYPE: TYPE: ACUTE PAIN

## 2021-11-30 ASSESSMENT — FIBROSIS 4 INDEX: FIB4 SCORE: 0.39

## 2021-12-01 ENCOUNTER — TELEMEDICINE (OUTPATIENT)
Dept: MEDICAL GROUP | Age: 36
End: 2021-12-01
Payer: COMMERCIAL

## 2021-12-01 VITALS — BODY MASS INDEX: 33.31 KG/M2 | HEIGHT: 62 IN | WEIGHT: 181 LBS

## 2021-12-01 DIAGNOSIS — R10.11 RIGHT UPPER QUADRANT ABDOMINAL PAIN: ICD-10-CM

## 2021-12-01 DIAGNOSIS — Z83.49 FHX: THYROID DISEASE: ICD-10-CM

## 2021-12-01 DIAGNOSIS — E04.9 THYROID ENLARGEMENT: ICD-10-CM

## 2021-12-01 PROBLEM — R52 PAIN ASSOCIATED WITH WOUND: Status: RESOLVED | Noted: 2021-02-24 | Resolved: 2021-12-01

## 2021-12-01 PROBLEM — T14.8XXA PAIN ASSOCIATED WITH WOUND: Status: RESOLVED | Noted: 2021-02-24 | Resolved: 2021-12-01

## 2021-12-01 PROCEDURE — 99215 OFFICE O/P EST HI 40 MIN: CPT | Mod: 95 | Performed by: FAMILY MEDICINE

## 2021-12-01 ASSESSMENT — FIBROSIS 4 INDEX: FIB4 SCORE: 0.39

## 2021-12-01 NOTE — ED TRIAGE NOTES
"Chief Complaint   Patient presents with   • RUQ Pain     started three days ago, states has been progressively worse today, increased pain after eating   • Nausea     /79   Pulse 86   Temp 36.6 °C (97.8 °F) (Temporal)   Resp 15   Ht 1.575 m (5' 2\")   Wt 82.5 kg (181 lb 14.1 oz)   SpO2 98%   BMI 33.27 kg/m²     Has this patient been vaccinated for COVID YES  If not, would they like to be vaccinated while in the ER if eligible?  NA  Would the patient like to speak with the ERP about the possibility of receiving the COVID vaccine today before making a decision? NA    Pt reports Hx of Cholecystectomy      "

## 2021-12-01 NOTE — ED PROVIDER NOTES
ED Provider Note    CHIEF COMPLAINT  Chief Complaint   Patient presents with   • RUQ Pain     started three days ago, states has been progressively worse today, increased pain after eating   • Nausea     HPI  Patient is a 36-year-old female with past medical history of gallstones status post removal presents emergency room for 3 days of upper abdominal discomfort.  She describes this as slowly progressing, worse today and noticed more severe pain 30 to 40 minutes after eating a meal.  She says his pain is a burning sensation, intermittently sharp and nonradiating.  This is not been associated with any flank pain, abdominal distention, diarrheal illness or any hematuria or dysuria.  She has had some burping and has a history of severe GERD and is not currently on any medications.  She is additionally a diabetic, has history of PCOS and had a recent delivery and is breast-feeding.    PPE Note: I personally donned full PPE for all patient encounters during this visit, including being clean-shaven with an N95 respirator mask, gloves, and goggles.     REVIEW OF SYSTEMS  Constitutional: No fevers, chills, or recent illness.  Skin: No rashes or diaphoresis.  Eyes: No change in vision, no discharge.  ENT: No hearing change. No rhinorrhea or nasal congestion, no ST or difficulty swallowing.  Respiratory: No SOB. No coughing  Cardiac: No CP, palpitations, edema  : No dysuria. No D/C. No frequency or urgency.   MSK: No pain in joints or muscles.   Neuro: No HA or paresthesias.     PAST MEDICAL HISTORY   has a past medical history of Diabetes (HCC), Obesity, PCOS (polycystic ovarian syndrome), and Pregnant.    SOCIAL HISTORY  Social History     Tobacco Use   • Smoking status: Never Smoker   • Smokeless tobacco: Never Used   Vaping Use   • Vaping Use: Never used   Substance and Sexual Activity   • Alcohol use: Yes   • Drug use: No   • Sexual activity: Yes       SURGICAL HISTORY   has a past surgical history that includes  "cholecystectomy; cervical cerclage (8/16/2020); and primary c section (N/A, 2/11/2021).    CURRENT MEDICATIONS  Home Medications    **Home medications have not yet been reviewed for this encounter**         ALLERGIES  No Known Allergies    PHYSICAL EXAM  VITAL SIGNS: /78   Pulse 78   Temp 36.6 °C (97.8 °F) (Temporal)   Resp 16   Ht 1.575 m (5' 2\")   Wt 82.5 kg (181 lb 14.1 oz)   SpO2 98%   BMI 33.27 kg/m²   Pulse ox interpretation: I interpret this pulse ox as normal.  Genl: F sitting in gurney uncomfortably, speaking clearly, appears in no acute distress   Head: NC/AT   ENT: Mucous membranes moist, posterior pharynx clear, uvula midline, nares patent bilaterally   Eyes: Normal sclera, pupils equal round reactive to light  Neck: Supple, FROM, no LAD appreciated   Pulmonary: Lungs are clear to auscultation bilaterally  Chest: No TTP  CV:  RRR, no murmur appreciated, pulses 2+ in both upper and lower extremities,  Abdomen: soft, gastric tenderness, nonspecific right upper quadrant tenderness with no true Menjivar sign.  Prior surgical incisions are clean dry and intact, there is no rebound or guarding, there is no true McBurney's point tenderness.  No true CVA tenderness. No masses palpated, no HSM  : no CVA or suprapubic tenderness   Musculoskeletal: Pain free ROM of the neck. Moving upper and lower extremities and spontaneous in coordinated fashion  Neuro: A&Ox4 (person, place, time, situation), speech fluent, gait steady, no focal deficits appreciated, No cerebellar signs. Sensation is grossly intact in the distal upper and lower extremities.  5/5 strength in  and dorsiflexion/plantar flexion of the ankles  Psych: Patient has an appropriate affect and behavior  Skin: No rash or lesions.  No pallor or jaundice.  No cyanosis.  Warm and dry.     DIAGNOSTIC STUDIES / PROCEDURES    LABS  Labs Reviewed   CBC WITH DIFFERENTIAL - Abnormal; Notable for the following components:       Result Value    [facility] " 33.3 (*)     All other components within normal limits   COMP METABOLIC PANEL - Abnormal; Notable for the following components:    Glucose 133 (*)     Alkaline Phosphatase 146 (*)     All other components within normal limits   LIPASE   URINALYSIS,CULTURE IF INDICATED    Narrative:     Indication for culture:->Patient WITHOUT an indwelling Ahmadi  catheter in place with new onset of Dysuria, Frequency,  Urgency, and/or Suprapubic pain   HCG QUAL SERUM   ESTIMATED GFR       RADIOLOGY  No orders to display       COURSE & MEDICAL DECISION MAKING  Pertinent Labs & Imaging studies reviewed. (See chart for details)    MDM    Initial evaluation at 1832:  Patient presents emergency room for symptoms as described above.  The patient is nontoxic in appearance, describes an upper abdominal discomfort with some food associations is most consistent with GI upset, esophagitis or possible peptic ulcer disease.  She has had prior cholecystectomy and does not have abdominal exam concerning for rigidity or progression to peritonitis.  She has been on GI prophylactic medications in the past however is not currently on them.  Initial dose of GI cocktail and Pepcid is given with some alleviation of her symptoms and lab work obtained to rule out any possible hepatobiliary obstruction.  Lab work is without any leukocytosis or anemia.  There is no gross LFT elevations and there is normal bilirubin and normal lipase.  She is not pregnant and has no other concomitant kidney injuries or other concerning changes.  Urinalysis is without any signs of acute stone pathology or infection.    Following administration of pain medications and GI medications she has stable vital signs at this time can be placed on Carafate and Pepcid.  I would recommend outpatient follow-up in 2 weeks time at completion of these medications with return to the emergency department should she develop fevers, chills, flank pain or inability to tolerate p.o. intake.   Questions are addressed and she is discharged home in stable condition.    FINAL IMPRESSION  Visit Diagnoses     ICD-10-CM   1. Epigastric pain  R10.13   2. Epigastric burning sensation  R10.13     Electronically signed by: Jaxson Hanna M.D., 11/30/2021 6:32 PM

## 2021-12-01 NOTE — ED NOTES
IV discontinued with cathlon intact    Discharge home with instructions, follow up care and rxn reviewed and given to patient with verbal understanding.      Ambulatory with a steady gait and stable condition

## 2021-12-01 NOTE — ED NOTES
IV attempted x 1 unsuccessful unable to draw blood.  Patient was stuck out in triage for blood draw twice unable to get any blood specimen

## 2021-12-01 NOTE — PROGRESS NOTES
Virtual Visit: Established Patient   This visit was conducted via Zoom using secure and encrypted videoconferencing technology. The patient was in a private location in the state of [place].    The patient's identity was confirmed and verbal consent was obtained for this virtual visit.    Subjective:   CC: RUQ pain    Stefania Renteria is a 36 y.o. female with history of insulin-dependent type 2 diabetes that is controlled. She was in her normal state of health until about 4 days ago when she develops intermittent RUQ pain. Pain is sharp, 3/10 in severity at rest, but can increase to 7/10 with bending over, taking deep breath, or with direct pressure applied to the affected area. She had cholecystectomy a few years ago. She denies any fever, chills, nausea, vomiting, epigastric pain, bloating, gas, constipation, diarrhea, hematochezia, melena, skin rash, hematuria, dysuria, urinary frequency, urgency.  Her last bowel movement was this morning.  She is 10 months postpartum.  She recently quit her job to be a full-time mother.  Her daughter is about 17 pounds.  She denies recent trauma to the affected area.  Patient went to ER yesterday for evaluation.  Laboratory studies including CBC, CMP, lipase, pregnancy test, urinalysis were negative for acute findings. She is still breast feeding.     She complained of slightly pendulous enlargement of her thyroid gland over the past few months.  Her mother has thyroid disease, but she does not remember the exact diagnosis.  She denies any symptoms of hypo or hyperthyroidism.      ROS   Denies any recent fevers or chills. No nausea or vomiting. No chest pains or shortness of breath.     No Known Allergies    Current medicines (including changes today)  Current Outpatient Medications   Medication Sig Dispense Refill   • famotidine (PEPCID) 20 MG Tab Take 1 Tablet by mouth 2 times a day for 14 days. 28 Tablet 0   • ondansetron (ZOFRAN ODT) 4 MG TABLET DISPERSIBLE Take 1 Tablet by mouth  "every 6 hours as needed. 10 Tablet 0   • sucralfate (CARAFATE) 1 GM Tab Take 1 Tablet by mouth 4 Times a Day,Before Meals and at Bedtime for 14 days. 56 Tablet 0   • metformin (GLUCOPHAGE) 1000 MG tablet TAKE 1 TABLET BY MOUTH TWICE A DAY 60 Tablet 0   • Prenatal Vit-Fe Fumarate-FA (PRENATAL VITAMINS PO) Take 1 Tab by mouth every morning.     • Continuous Blood Gluc  (FREESTYLE DIEGO READER) Device 1 Device by Does not apply route 6 Times a Day. Please give 14 day sensor and reader 1 Device 1   • Continuous Blood Gluc Sensor (FREESTYLE DIEGO SENSOR SYSTEM) Misc 1 Applicator by Does not apply route every 14 days. Please give 14 day sensor and reader 2 Each 11     No current facility-administered medications for this visit.       Patient Active Problem List    Diagnosis Date Noted   • Long-term insulin use (HCC) 04/01/2020   • Type 2 diabetes mellitus with hypoglycemia, with long-term current use of insulin (HCC) 05/01/2018   • Obesity (BMI 30.0-34.9) 05/01/2018   • Health care maintenance 05/01/2018   • Dyslipidemia 05/01/2018       Family History   Problem Relation Age of Onset   • Hypertension Mother    • Diabetes Father    • Hyperlipidemia Father    • No Known Problems Sister    • Diabetes Brother        She  has a past medical history of Diabetes (HCC), Obesity, PCOS (polycystic ovarian syndrome), and Pregnant.  She  has a past surgical history that includes cholecystectomy; cervical cerclage (8/16/2020); and primary c section (N/A, 2/11/2021).       Objective:   Ht 1.575 m (5' 2\") Comment: pt reported  Wt 82.1 kg (181 lb) Comment: pt reported  BMI 33.11 kg/m²     Physical Exam:  Constitutional: Alert, no distress, well-groomed.  Skin: No rashes in visible areas.  Eye: Round. Conjunctiva clear, lids normal. No icterus.   ENMT: Lips pink without lesions, good dentition, moist mucous membranes. Phonation normal.  Neck: No masses, no thyromegaly. Moves freely without pain.  Respiratory: Unlabored " respiratory effort, no cough or audible wheeze  Psych: Alert and oriented x3, normal affect and mood.       Assessment and Plan:   The following treatment plan was discussed:     1. Right upper quadrant abdominal pain  Acute RUQ pain is likely musculoskeletal per history.   Low clinical concerns for intraabdominal or urinary problems based on symptoms and labs done by ER yesterday.   Recommended heat and Tylenol PRN for pain.   Patient should continue to monitor for symptoms.   Ok to email me should new symptoms develop.   ER precautions discussed.     2. FHx: thyroid disease  3. Thyroid enlargement  - TSH WITH REFLEX TO FT4; Future  - US-SOFT TISSUES OF HEAD - NECK; Future     Total time spent reviewing pt's chart, labs, notes, imaging, and counseling/prescribing medications to patient before, during, and after the visit: 40 minutes.      Follow-up: Return for As needed.

## 2021-12-05 ENCOUNTER — HOSPITAL ENCOUNTER (OUTPATIENT)
Dept: RADIOLOGY | Facility: MEDICAL CENTER | Age: 36
End: 2021-12-05
Attending: FAMILY MEDICINE
Payer: COMMERCIAL

## 2021-12-05 DIAGNOSIS — E04.9 THYROID ENLARGEMENT: ICD-10-CM

## 2021-12-05 DIAGNOSIS — Z83.49 FHX: THYROID DISEASE: ICD-10-CM

## 2021-12-05 PROCEDURE — 76536 US EXAM OF HEAD AND NECK: CPT

## 2021-12-07 PROBLEM — E04.1 THYROID NODULE: Status: ACTIVE | Noted: 2021-12-07

## 2021-12-10 ENCOUNTER — PATIENT MESSAGE (OUTPATIENT)
Dept: MEDICAL GROUP | Age: 36
End: 2021-12-10

## 2021-12-14 DIAGNOSIS — E04.1 THYROID NODULE: ICD-10-CM

## 2021-12-18 DIAGNOSIS — E11.9 CONTROLLED TYPE 2 DIABETES MELLITUS WITHOUT COMPLICATION, WITHOUT LONG-TERM CURRENT USE OF INSULIN (HCC): ICD-10-CM

## 2021-12-20 NOTE — TELEPHONE ENCOUNTER
Received request via: Pharmacy    Was the patient seen in the last year in this department? No     Does the patient have an active prescription (recently filled or refills available) for medication(s) requested? No     REQUESTED MEDICATION:   Requested Prescriptions     Pending Prescriptions Disp Refills   • metformin (GLUCOPHAGE) 1000 MG tablet [Pharmacy Med Name: METFORMIN HCL 1,000 MG TABLET] 60 Tablet 0     Sig: TAKE 1 TABLET BY MOUTH TWICE A DAY

## 2022-01-07 ENCOUNTER — HOSPITAL ENCOUNTER (OUTPATIENT)
Dept: RADIOLOGY | Facility: MEDICAL CENTER | Age: 37
End: 2022-01-07
Attending: FAMILY MEDICINE
Payer: COMMERCIAL

## 2022-01-07 DIAGNOSIS — E04.1 THYROID NODULE: ICD-10-CM

## 2022-01-07 PROCEDURE — 88112 CYTOPATH CELL ENHANCE TECH: CPT

## 2022-01-07 PROCEDURE — 10005 FNA BX W/US GDN 1ST LES: CPT

## 2022-01-07 NOTE — PROGRESS NOTES
US guided Left thyroid nodule fine needle aspiration done by Dr. Tierney; NON-SEDATION (no H&P required as this is a NON SEDATION procedure) Left anterior aspect of neck access site; 1 jar of cytolyt obtained and sent to pathology lab; pt tolerated the procedure well; pt hemodynamically stable pre/intra/post procedure; all questions and concerns answered prior to being d/c; patient provided with appropriate education for procedure; pt d/c home.

## 2022-01-10 LAB — CYTOLOGY REG CYTOL: NORMAL

## 2022-02-09 ENCOUNTER — APPOINTMENT (OUTPATIENT)
Dept: ENDOCRINOLOGY | Facility: MEDICAL CENTER | Age: 37
End: 2022-02-09
Attending: INTERNAL MEDICINE
Payer: COMMERCIAL

## 2022-06-14 ENCOUNTER — TELEMEDICINE (OUTPATIENT)
Dept: MEDICAL GROUP | Age: 37
End: 2022-06-14
Payer: COMMERCIAL

## 2022-06-14 VITALS — WEIGHT: 180 LBS | BODY MASS INDEX: 33.13 KG/M2 | RESPIRATION RATE: 16 BRPM | HEIGHT: 62 IN

## 2022-06-14 DIAGNOSIS — E66.9 OBESITY (BMI 30.0-34.9): ICD-10-CM

## 2022-06-14 DIAGNOSIS — E78.5 DYSLIPIDEMIA: ICD-10-CM

## 2022-06-14 DIAGNOSIS — E11.9 NON-INSULIN TREATED TYPE 2 DIABETES MELLITUS (HCC): ICD-10-CM

## 2022-06-14 DIAGNOSIS — M79.674 PAIN IN RIGHT TOE(S): ICD-10-CM

## 2022-06-14 PROBLEM — E11.649 TYPE 2 DIABETES MELLITUS WITH HYPOGLYCEMIA, WITH LONG-TERM CURRENT USE OF INSULIN (HCC): Status: RESOLVED | Noted: 2018-05-01 | Resolved: 2022-06-14

## 2022-06-14 PROBLEM — Z79.4 LONG-TERM INSULIN USE (HCC): Status: RESOLVED | Noted: 2020-04-01 | Resolved: 2022-06-14

## 2022-06-14 PROBLEM — Z79.4 TYPE 2 DIABETES MELLITUS WITH HYPOGLYCEMIA, WITH LONG-TERM CURRENT USE OF INSULIN (HCC): Status: RESOLVED | Noted: 2018-05-01 | Resolved: 2022-06-14

## 2022-06-14 PROCEDURE — 99214 OFFICE O/P EST MOD 30 MIN: CPT | Mod: 95 | Performed by: INTERNAL MEDICINE

## 2022-06-14 ASSESSMENT — FIBROSIS 4 INDEX: FIB4 SCORE: 0.4

## 2022-06-14 ASSESSMENT — PATIENT HEALTH QUESTIONNAIRE - PHQ9: CLINICAL INTERPRETATION OF PHQ2 SCORE: 0

## 2022-06-14 NOTE — PROGRESS NOTES
Telemedicine Visit: Established Patient     This Remote Face to Face encounter was conducted via Zoom. Given the importance of social distancing and other strategies recommended to reduce the risk of COVID-19 transmission, I am providing medical care to this patient via audio/video visit in place of an in person visit at the request of the patient. Verbal consent to telehealth, risks, benefits, and consequences were discussed. Patient retains the right to withdraw at any time. All existing confidentiality protections apply. The patient has access to all transmitted medical information. No dissemination of any patient images or information to other entities without further written consent.    CHIEF COMPLAINT     Chief Complaint   Patient presents with   • Diabetes   • Other     Hurt right toe from tripping     HPI  Stefania Renteria is a 37 y.o. female who presents today for the following chronic medical conditions  · DM2  · Dyslipidemia  · BMI 32    Acute/new problem  · Right toe pain    Reviewed PMH, PSH, FH, SH, ALL, HCM/IMM, no changes  Reviewed MEDS, no changes    Patient Active Problem List    Diagnosis Date Noted   • Thyroid nodule 12/07/2021   • Obesity (BMI 30.0-34.9) 05/01/2018   • Health care maintenance 05/01/2018   • Dyslipidemia 05/01/2018     CURRENT MEDICATIONS  Current Outpatient Medications   Medication Sig Dispense Refill   • metformin (GLUCOPHAGE) 1000 MG tablet TAKE 1 TABLET BY MOUTH TWICE A  Tablet 2   • Prenatal Vit-Fe Fumarate-FA (PRENATAL VITAMINS PO) Take 1 Tab by mouth every morning.       No current facility-administered medications for this visit.     ALLERGIES  Allergies: Patient has no known allergies.  PAST MEDICAL HISTORY  Past Medical History:   Diagnosis Date   • Diabetes (HCC)     oral, insulin    • Obesity    • PCOS (polycystic ovarian syndrome)    • Pregnant      SURGICAL HISTORY  She  has a past surgical history that includes cholecystectomy; cervical cerclage (8/16/2020); and  "primary c section (N/A, 2/11/2021).  SOCIAL HISTORY  Social History     Tobacco Use   • Smoking status: Never Smoker   • Smokeless tobacco: Never Used   Vaping Use   • Vaping Use: Never used   Substance Use Topics   • Alcohol use: Yes     Comment: occ   • Drug use: No     Social History     Social History Narrative   • Not on file     FAMILY HISTORY  Family History   Problem Relation Age of Onset   • Hypertension Mother    • Diabetes Father    • Hyperlipidemia Father    • No Known Problems Sister    • Diabetes Brother      Family Status   Relation Name Status   • Mo  Alive   • Fa  Alive   • Sis  Alive   • Bro  Alive   • Sis  Alive     ROS   Constitutional: Negative for fever, chills, fatigue.  HENT: Negative for congestion, sore throat.  Eyes: Negative for vision problems.   Respiratory: Negative for cough, shortness of breath.  Cardiovascular: Negative for chest pain, palpitations.   Gastrointestinal: Negative for heartburn, nausea, abdominal pain.   Genitourinary: Negative for dysuria.  Musculoskeletal: Per HPI.   Skin: Negative for rash.   Neuro: Negative for dizziness, weakness and headaches.   Endo/Heme/Allergies: Does not bruise/bleed easily.   Psychiatric/Behavioral: Negative for depression.    Objective   Vitals obtained by patient:  Respiration 16   Height 1.575 m (5' 2\")   Weight 81.6 kg (180 lb)  Body mass index is 32.92 kg/m².   Physical Exam:  Constitutional: Alert, no distress, well-groomed.  Skin: No rash in visible areas.  Eye: Round. Conjunctiva clear, lids normal.  ENMT: Lips pink without lesions, good dentition. Phonation normal.  Neck: No visible masses or thyromegaly. Moves freely without pain.  CV: no peripheral cyanosis, tachycardia.  Respiratory: Unlabored respiratory effort, no cough or audible wheezing.  Psych: Alert and oriented x3, normal affect and mood.     Labs     Labs are reviewed and discussed with a patient  Lab Results   Component Value Date/Time    CHOLSTRLTOT 185 03/26/2020 " 07:33 AM     (H) 03/26/2020 07:33 AM    HDL 54 03/26/2020 07:33 AM    TRIGLYCERIDE 121 03/26/2020 07:33 AM       Lab Results   Component Value Date/Time    SODIUM 137 11/30/2021 06:55 PM    POTASSIUM 4.0 11/30/2021 06:55 PM    CHLORIDE 101 11/30/2021 06:55 PM    CO2 21 11/30/2021 06:55 PM    GLUCOSE 133 (H) 11/30/2021 06:55 PM    BUN 14 11/30/2021 06:55 PM    CREATININE 0.53 11/30/2021 06:55 PM    BUNCREATRAT 14 10/12/2019 05:39 AM     Lab Results   Component Value Date/Time    ALKPHOSPHAT 146 (H) 11/30/2021 06:55 PM    ASTSGOT 17 11/30/2021 06:55 PM    ALTSGPT 32 11/30/2021 06:55 PM    TBILIRUBIN 0.4 11/30/2021 06:55 PM      Lab Results   Component Value Date/Time    HBA1C 5.5 03/26/2020 07:33 AM    HBA1C 8.7 (H) 10/12/2019 05:39 AM    HBA1C 7.1 (H) 10/27/2018 07:54 AM    HBA1C 7.2 05/08/2018 11:35 AM     Lab Results   Component Value Date/Time    TSH 2.670 10/12/2019 05:39 AM     No results found for: FREET4    Lab Results   Component Value Date/Time    WBC 9.9 11/30/2021 06:55 PM    RBC 4.56 11/30/2021 06:55 PM    HEMOGLOBIN 13.3 11/30/2021 06:55 PM    HEMATOCRIT 39.9 11/30/2021 06:55 PM    MCV 87.5 11/30/2021 06:55 PM    MCH 29.2 11/30/2021 06:55 PM    MCHC 33.3 (L) 11/30/2021 06:55 PM    MPV 10.4 11/30/2021 06:55 PM    NEUTSPOLYS 55.00 11/30/2021 06:55 PM    LYMPHOCYTES 32.70 11/30/2021 06:55 PM    MONOCYTES 7.60 11/30/2021 06:55 PM    EOSINOPHILS 3.50 11/30/2021 06:55 PM    BASOPHILS 0.40 11/30/2021 06:55 PM        Imaging      Pending    Assessment and Plan     Stefania Renteria is a 37 y.o. female    1. Non-insulin treated type 2 diabetes mellitus (HCC)  On metformin 1000 mg twice daily, the last A1c was 5.5 on 3/26/2020  -She was followed up in endocrinology department  Pending labs, continue current treatment  - Diabetic Monofilament Lower Extremity Exam  - Comp Metabolic Panel; Future  - Hemoglobin A1c; Future  - Microalbumin Creat Ratio Urine - Lab Collect; Future  - Referral to Ophthalmology    2.  Dyslipidemia  The patient has have borderline high LDL cholesterol intermittently, along with high HDL, no medications  Advised low-calorie diet, daily exercise, weight control  - Comp Metabolic Panel; Future  - Lipid Profile; Future  - Referral to Ophthalmology    3. Obesity (BMI 30.0-34.9)  - Patient identified as having weight management issue.  Appropriate orders and counseling given.    4. Pain in right toe(s)  She tripped few days ago and developed persistent right great toe pain, without significant bruising;  -She requested x-ray  Advised to continue activity as tolerated  - DX-FOOT-2- RIGHT; Future    Pap smear: Pending records, up to date    Follow-up: Within 2 weeks, labs

## 2022-06-20 ENCOUNTER — HOSPITAL ENCOUNTER (OUTPATIENT)
Dept: LAB | Facility: MEDICAL CENTER | Age: 37
End: 2022-06-20
Attending: INTERNAL MEDICINE
Payer: COMMERCIAL

## 2022-06-20 DIAGNOSIS — E11.9 NON-INSULIN TREATED TYPE 2 DIABETES MELLITUS (HCC): ICD-10-CM

## 2022-06-20 DIAGNOSIS — E78.5 DYSLIPIDEMIA: ICD-10-CM

## 2022-06-20 LAB
ALBUMIN SERPL BCP-MCNC: 4.2 G/DL (ref 3.2–4.9)
ALBUMIN/GLOB SERPL: 1.2 G/DL
ALP SERPL-CCNC: 163 U/L (ref 30–99)
ALT SERPL-CCNC: 17 U/L (ref 2–50)
ANION GAP SERPL CALC-SCNC: 11 MMOL/L (ref 7–16)
AST SERPL-CCNC: 12 U/L (ref 12–45)
BILIRUB SERPL-MCNC: 0.4 MG/DL (ref 0.1–1.5)
BUN SERPL-MCNC: 12 MG/DL (ref 8–22)
CALCIUM SERPL-MCNC: 9 MG/DL (ref 8.5–10.5)
CHLORIDE SERPL-SCNC: 100 MMOL/L (ref 96–112)
CHOLEST SERPL-MCNC: 244 MG/DL (ref 100–199)
CO2 SERPL-SCNC: 22 MMOL/L (ref 20–33)
CREAT SERPL-MCNC: 0.49 MG/DL (ref 0.5–1.4)
CREAT UR-MCNC: 60.5 MG/DL
EST. AVERAGE GLUCOSE BLD GHB EST-MCNC: 229 MG/DL
FASTING STATUS PATIENT QL REPORTED: NORMAL
GFR SERPLBLD CREATININE-BSD FMLA CKD-EPI: 123 ML/MIN/1.73 M 2
GLOBULIN SER CALC-MCNC: 3.5 G/DL (ref 1.9–3.5)
GLUCOSE SERPL-MCNC: 247 MG/DL (ref 65–99)
HBA1C MFR BLD: 9.6 % (ref 4–5.6)
HDLC SERPL-MCNC: 32 MG/DL
LDLC SERPL CALC-MCNC: ABNORMAL MG/DL
MICROALBUMIN UR-MCNC: 15.5 MG/DL
MICROALBUMIN/CREAT UR: 256 MG/G (ref 0–30)
POTASSIUM SERPL-SCNC: 4.1 MMOL/L (ref 3.6–5.5)
PROT SERPL-MCNC: 7.7 G/DL (ref 6–8.2)
SODIUM SERPL-SCNC: 133 MMOL/L (ref 135–145)
TRIGL SERPL-MCNC: 816 MG/DL (ref 0–149)
TSH SERPL DL<=0.005 MIU/L-ACNC: 0.8 UIU/ML (ref 0.38–5.33)

## 2022-06-20 PROCEDURE — 84443 ASSAY THYROID STIM HORMONE: CPT

## 2022-06-20 PROCEDURE — 83036 HEMOGLOBIN GLYCOSYLATED A1C: CPT

## 2022-06-20 PROCEDURE — 82043 UR ALBUMIN QUANTITATIVE: CPT

## 2022-06-20 PROCEDURE — 36415 COLL VENOUS BLD VENIPUNCTURE: CPT

## 2022-06-20 PROCEDURE — 80061 LIPID PANEL: CPT

## 2022-06-20 PROCEDURE — 82570 ASSAY OF URINE CREATININE: CPT

## 2022-06-20 PROCEDURE — 80053 COMPREHEN METABOLIC PANEL: CPT

## 2022-08-23 ENCOUNTER — PATIENT MESSAGE (OUTPATIENT)
Dept: MEDICAL GROUP | Age: 37
End: 2022-08-23
Payer: COMMERCIAL

## 2022-08-23 DIAGNOSIS — E11.9 NON-INSULIN DEPENDENT TYPE 2 DIABETES MELLITUS (HCC): ICD-10-CM

## 2022-08-23 DIAGNOSIS — E11.9 CONTROLLED TYPE 2 DIABETES MELLITUS WITHOUT COMPLICATION, WITHOUT LONG-TERM CURRENT USE OF INSULIN (HCC): ICD-10-CM

## 2022-08-23 DIAGNOSIS — E04.1 THYROID NODULE: ICD-10-CM

## 2022-08-24 NOTE — PATIENT COMMUNICATION
How would the patient prefer to be contacted with a response: [employer] message    2. SPECIFIC Action To Be Taken: Referral pending, please sign.    3. Diagnosis/Clinical Reason for Request: Type 2 diabetes mellitus with hypoglycemia, with long-term current use of insulin (HCC) (Resolved)    4. Specialty & Provider Name/Lab/Imaging Location: Dr. Oseguera - Endocrinology    5. Is appointment scheduled for requested order/referral: no    Patient was informed they will receive a return phone call from the office ONLY if there are any questions before processing their request. Advised to call back if they haven't received a call from the referral department in 5 days.

## 2022-09-14 ENCOUNTER — HOSPITAL ENCOUNTER (OUTPATIENT)
Dept: LAB | Facility: MEDICAL CENTER | Age: 37
End: 2022-09-14
Attending: OBSTETRICS & GYNECOLOGY
Payer: COMMERCIAL

## 2022-09-14 LAB
25(OH)D3 SERPL-MCNC: 22 NG/ML (ref 30–100)
EST. AVERAGE GLUCOSE BLD GHB EST-MCNC: 180 MG/DL
HBA1C MFR BLD: 7.9 % (ref 4–5.6)
PROLACTIN SERPL-MCNC: 16.4 NG/ML (ref 2.8–26)
TSH SERPL DL<=0.005 MIU/L-ACNC: 1.5 UIU/ML (ref 0.38–5.33)

## 2022-09-14 PROCEDURE — 36415 COLL VENOUS BLD VENIPUNCTURE: CPT

## 2022-09-14 PROCEDURE — 82306 VITAMIN D 25 HYDROXY: CPT

## 2022-09-14 PROCEDURE — 84146 ASSAY OF PROLACTIN: CPT

## 2022-09-14 PROCEDURE — 83036 HEMOGLOBIN GLYCOSYLATED A1C: CPT

## 2022-09-14 PROCEDURE — 84443 ASSAY THYROID STIM HORMONE: CPT

## 2022-10-10 ENCOUNTER — PATIENT MESSAGE (OUTPATIENT)
Dept: MEDICAL GROUP | Age: 37
End: 2022-10-10
Payer: COMMERCIAL

## 2022-10-10 DIAGNOSIS — E11.9 TYPE 2 DIABETES MELLITUS WITHOUT COMPLICATION, WITHOUT LONG-TERM CURRENT USE OF INSULIN (HCC): ICD-10-CM

## 2022-10-11 ENCOUNTER — PATIENT MESSAGE (OUTPATIENT)
Dept: MEDICAL GROUP | Age: 37
End: 2022-10-11
Payer: COMMERCIAL

## 2022-10-11 DIAGNOSIS — E11.9 TYPE 2 DIABETES MELLITUS WITHOUT COMPLICATION, WITHOUT LONG-TERM CURRENT USE OF INSULIN (HCC): ICD-10-CM

## 2022-10-11 PROBLEM — Z00.00 HEALTH CARE MAINTENANCE: Status: RESOLVED | Noted: 2018-05-01 | Resolved: 2022-10-11

## 2022-10-13 NOTE — PATIENT COMMUNICATION
Received request via: Patient    Was the patient seen in the last year in this department? Yes - est care with Dr. Deshpande 11/2022    Does the patient have an active prescription (recently filled or refills available) for medication(s) requested? Yes, new rx sent on 10/11 to [facility], pt would like at [facility] on [address] instead, needs new rx sent.   
No

## 2022-10-28 ENCOUNTER — PATIENT MESSAGE (OUTPATIENT)
Dept: MEDICAL GROUP | Age: 37
End: 2022-10-28
Payer: COMMERCIAL

## 2022-10-28 DIAGNOSIS — E11.9 CONTROLLED TYPE 2 DIABETES MELLITUS WITHOUT COMPLICATION, WITHOUT LONG-TERM CURRENT USE OF INSULIN (HCC): ICD-10-CM

## 2022-10-28 DIAGNOSIS — R80.9 MICROALBUMINURIA: ICD-10-CM

## 2022-10-28 DIAGNOSIS — E11.9 TYPE 2 DIABETES MELLITUS WITHOUT COMPLICATION, WITHOUT LONG-TERM CURRENT USE OF INSULIN (HCC): ICD-10-CM

## 2022-10-28 DIAGNOSIS — E55.9 VITAMIN D INSUFFICIENCY: ICD-10-CM

## 2022-10-28 DIAGNOSIS — E78.5 DYSLIPIDEMIA: ICD-10-CM

## 2022-10-28 DIAGNOSIS — E04.1 THYROID NODULE: ICD-10-CM

## 2022-10-31 PROBLEM — R80.9 MICROALBUMINURIA: Status: ACTIVE | Noted: 2022-10-31

## 2022-10-31 PROBLEM — E55.9 VITAMIN D INSUFFICIENCY: Status: ACTIVE | Noted: 2022-10-31

## 2022-11-21 ENCOUNTER — TELEMEDICINE (OUTPATIENT)
Dept: MEDICAL GROUP | Age: 37
End: 2022-11-21
Payer: COMMERCIAL

## 2022-11-21 VITALS — BODY MASS INDEX: 32.39 KG/M2 | RESPIRATION RATE: 16 BRPM | HEIGHT: 62 IN | WEIGHT: 176 LBS

## 2022-11-21 DIAGNOSIS — R80.9 MICROALBUMINURIA: ICD-10-CM

## 2022-11-21 DIAGNOSIS — E55.9 VITAMIN D INSUFFICIENCY: ICD-10-CM

## 2022-11-21 DIAGNOSIS — Z11.59 NEED FOR HEPATITIS C SCREENING TEST: ICD-10-CM

## 2022-11-21 DIAGNOSIS — E78.2 MIXED HYPERLIPIDEMIA: ICD-10-CM

## 2022-11-21 DIAGNOSIS — E78.1 HYPERTRIGLYCERIDEMIA: ICD-10-CM

## 2022-11-21 DIAGNOSIS — E66.9 OBESITY (BMI 30.0-34.9): ICD-10-CM

## 2022-11-21 DIAGNOSIS — E11.9 TYPE 2 DIABETES MELLITUS WITHOUT COMPLICATION, WITHOUT LONG-TERM CURRENT USE OF INSULIN (HCC): ICD-10-CM

## 2022-11-21 PROCEDURE — 99215 OFFICE O/P EST HI 40 MIN: CPT | Mod: 95 | Performed by: FAMILY MEDICINE

## 2022-11-21 RX ORDER — INSULIN GLARGINE 100 [IU]/ML
10 INJECTION, SOLUTION SUBCUTANEOUS EVERY EVENING
Qty: 9 ML | Refills: 0 | Status: SHIPPED | OUTPATIENT
Start: 2022-11-21 | End: 2023-03-13

## 2022-11-21 ASSESSMENT — FIBROSIS 4 INDEX: FIB4 SCORE: 0.38

## 2022-11-21 NOTE — PROGRESS NOTES
Virtual Visit: Established Patient   This visit was conducted via Zoom using secure and encrypted videoconferencing technology.   The patient was in their home in the state [facility].    The patient's identity was confirmed and verbal consent was obtained for this virtual visit.     Subjective:   CC:   Chief Complaint   Patient presents with    Follow-Up     diabetes       Stefania Renteria is a 37 y.o. female with chronic poorly controlled type 2 diabetes, mixed hyperlipidemia, vitamin D insufficiency, and obesity.  Patient has not seen the endocrinologist in 2020.  A1c done in 2022 was 9.6.  Repeat A1c in 2022 was 7.9.  Patient has been working on dietary and lifestyle modification to control blood sugar.  Negative visual changes, concerning lesion on her feet, symptoms of polyneuropathy.    Patient is , currently working with fertility specialists for IVF.  Patient was instructed to bring her A1c to less than 7 before embryo transplant.  Patient is not able to see Dr. Oseguera, endocrinologist, until 2022.  Patient present today to discuss diabetic management.    Patient is taking 2000 units of vitamin D daily for vitamin D insufficiency.    Current medicines (including changes today)  Current Outpatient Medications   Medication Sig Dispense Refill    VITAMIN D PO       Continuous Blood Gluc Sensor (FREESTYLE DIEGO 2 SENSOR) Misc 1 Units every 14 days. 2 Each 11    insulin glargine 100 UNIT/ML SC SOPN injection Inject 10 Units under the skin every evening for 90 days. 9 mL 0    Insulin Pen Needle 31G X 4 MM Misc 1 Units every evening. 100 Each 2    metformin (GLUCOPHAGE) 1000 MG tablet Take 1 Tablet by mouth 2 times a day. 180 Tablet 0    Prenatal Vit-Fe Fumarate-FA (PRENATAL VITAMINS PO) Take 1 Tab by mouth every morning.       No current facility-administered medications for this visit.       Patient Active Problem List    Diagnosis Date Noted    Hypertriglyceridemia 2022     "Microalbuminuria 10/31/2022    Vitamin D insufficiency 10/31/2022    Thyroid nodule 12/07/2021    Type 2 diabetes mellitus without complication, without long-term current use of insulin ([facility]) 05/01/2018    Obesity (BMI 30.0-34.9) 05/01/2018    Dyslipidemia 05/01/2018        Objective:   Resp 16   Ht 1.575 m (5' 2\")   Wt 79.8 kg (176 lb)   BMI 32.19 kg/m²     Physical Exam:  Constitutional: Alert, no distress, well-groomed.  Skin: No rashes in visible areas.  Eye: Round. Conjunctiva clear, lids normal. No icterus.   ENMT: Lips pink without lesions, good dentition, moist mucous membranes. Phonation normal.  Neck: No masses, no thyromegaly. Moves freely without pain.  Respiratory: Unlabored respiratory effort, no cough or audible wheeze  Psych: Alert and oriented x3, normal affect and mood.     Assessment and Plan:   The following treatment plan was discussed:     1. Type 2 diabetes mellitus without complication, without long-term current use of insulin ([facility])  Chronic, poorly controlled, A1c improved from 9.6 to 7.9 from 6/2022 to 9/ 2022.  Patient is currently taking metformin 1000 mg twice daily.  Her last visit with endocrinology was in April 2020.  Patient is working with fertility specialist for IVF.  She was instructed to bring her A1c to less than 7 before embryo transplant.  - insulin glargine 100 UNIT/ML SC SOPN injection; Inject 10 Units under the skin every evening for 90 days.  Dispense: 9 mL; Refill: 0  - Continue metformin 1000 mg twice daily.   - Insulin Pen Needle 31G X 4 MM Misc; 1 Units every evening.  Dispense: 100 Each; Refill: 2  - Continuous Blood Gluc Sensor (FREESTYLE DIEGO 2 SENSOR) Misc; 1 Units every 14 days.  Dispense: 2 Each; Refill: 11  - HEMOGLOBIN A1C; Future  - CBC WITH DIFFERENTIAL; Future  - Comp Metabolic Panel; Future  - dietary modification, exercise, weight loss  - Annual eye exam: Due, patient was reminded to schedule appointment with her eye doctor.  - Regular foot exam  - " Discussed prevention and management of hypoglycemia  - Side effects of all medications discussed with patient  - Follow up in 3 months.     2. Mixed hyperlipidemia  3. Hypertriglyceridemia  Chronic, previous labs done in June 2022 was notable for markedly elevated serum triglyceride and cholesterol.  Patient is planning to get pregnant through IVF.  We will avoid starting new medications at this time.  Patient was advised to work on dietary and lifestyle modification to control poor condition.  We will repeat labs after delivery for reassessment.    4. Vitamin D insufficiency  Chronic, currently taking vitamin D 2000 units a day.  We will repeat lab for reassessment.  - VITAMIN D,25 HYDROXY (DEFICIENCY); Future    5. Obesity (BMI 30.0-34.9)  - Patient identified as having weight management issue.  Appropriate orders and counseling given.     6. Microalbuminuria  Labs done in June 2022 was notable for microalbuminuria, likely secondary to poorly controlled type 2 diabetes.  We will consider ACE-inhibitor or ARB after pregnancy.    7. Need for hepatitis C screening test  - HEP C VIRUS ANTIBODY; Future     Patient was advised to get pneumonia vaccine, flu vaccine, and COVID booster prior to embryo transplant.  She was reminded to continue prenatal vitamin.  She will have Pap records faxed to our office.    Total time spent reviewing pt's chart, labs, notes, imaging, and counseling/prescribing medications to patient before, during, and after the visit: 40 minutes.      Follow-up: Return in about 3 months (around 2/21/2023).

## 2022-11-23 ENCOUNTER — PATIENT MESSAGE (OUTPATIENT)
Dept: MEDICAL GROUP | Age: 37
End: 2022-11-23
Payer: COMMERCIAL

## 2022-11-23 DIAGNOSIS — E11.9 TYPE 2 DIABETES MELLITUS WITHOUT COMPLICATION, WITHOUT LONG-TERM CURRENT USE OF INSULIN (HCC): ICD-10-CM

## 2022-11-28 ENCOUNTER — PATIENT MESSAGE (OUTPATIENT)
Dept: MEDICAL GROUP | Age: 37
End: 2022-11-28
Payer: COMMERCIAL

## 2022-11-28 ENCOUNTER — HOSPITAL ENCOUNTER (OUTPATIENT)
Facility: MEDICAL CENTER | Age: 37
End: 2022-11-28
Attending: OBSTETRICS & GYNECOLOGY
Payer: COMMERCIAL

## 2022-11-28 DIAGNOSIS — E11.9 TYPE 2 DIABETES MELLITUS WITHOUT COMPLICATION, WITHOUT LONG-TERM CURRENT USE OF INSULIN (HCC): ICD-10-CM

## 2022-11-28 PROCEDURE — 88175 CYTOPATH C/V AUTO FLUID REDO: CPT

## 2022-11-28 PROCEDURE — 87624 HPV HI-RISK TYP POOLED RSLT: CPT

## 2022-11-28 RX ORDER — PROCHLORPERAZINE 25 MG/1
1 SUPPOSITORY RECTAL ONCE
Qty: 1 EACH | Refills: 0 | Status: SHIPPED | OUTPATIENT
Start: 2022-11-28 | End: 2022-11-28 | Stop reason: SDUPTHER

## 2022-11-28 RX ORDER — PROCHLORPERAZINE 25 MG/1
1 SUPPOSITORY RECTAL
Qty: 3 EACH | Refills: 6 | Status: SHIPPED | OUTPATIENT
Start: 2022-11-28 | End: 2022-11-28 | Stop reason: SDUPTHER

## 2022-11-28 RX ORDER — PROCHLORPERAZINE 25 MG/1
1 SUPPOSITORY RECTAL ONCE
Qty: 1 EACH | Refills: 3 | Status: SHIPPED | OUTPATIENT
Start: 2022-11-28 | End: 2022-11-28 | Stop reason: SDUPTHER

## 2022-11-30 DIAGNOSIS — E11.9 TYPE 2 DIABETES MELLITUS WITHOUT COMPLICATION, WITHOUT LONG-TERM CURRENT USE OF INSULIN (HCC): ICD-10-CM

## 2022-11-30 LAB
CYTOLOGY REG CYTOL: NORMAL
HPV HR 12 DNA CVX QL NAA+PROBE: NEGATIVE
HPV16 DNA SPEC QL NAA+PROBE: NEGATIVE
HPV18 DNA SPEC QL NAA+PROBE: NEGATIVE
SPECIMEN SOURCE: NORMAL

## 2022-11-30 RX ORDER — PROCHLORPERAZINE 25 MG/1
1 SUPPOSITORY RECTAL ONCE
Qty: 1 EACH | Refills: 0 | Status: SHIPPED | OUTPATIENT
Start: 2022-11-30 | End: 2022-11-30

## 2022-11-30 RX ORDER — PROCHLORPERAZINE 25 MG/1
1 SUPPOSITORY RECTAL ONCE
Qty: 1 EACH | Refills: 3 | Status: SHIPPED | OUTPATIENT
Start: 2022-11-30 | End: 2022-11-30

## 2022-11-30 RX ORDER — PROCHLORPERAZINE 25 MG/1
1 SUPPOSITORY RECTAL
Qty: 3 EACH | Refills: 6 | Status: SHIPPED | OUTPATIENT
Start: 2022-11-30 | End: 2022-12-30 | Stop reason: SDUPTHER

## 2022-12-01 RX ORDER — PEN NEEDLE, DIABETIC 32GX 5/32"
NEEDLE, DISPOSABLE MISCELLANEOUS
Qty: 100 EACH | Refills: 1 | Status: SHIPPED | OUTPATIENT
Start: 2022-12-01 | End: 2024-01-22 | Stop reason: SDUPTHER

## 2022-12-12 DIAGNOSIS — E11.9 CONTROLLED TYPE 2 DIABETES MELLITUS WITHOUT COMPLICATION, WITHOUT LONG-TERM CURRENT USE OF INSULIN (HCC): ICD-10-CM

## 2022-12-12 DIAGNOSIS — E11.9 TYPE 2 DIABETES MELLITUS WITHOUT COMPLICATION, WITHOUT LONG-TERM CURRENT USE OF INSULIN (HCC): ICD-10-CM

## 2022-12-22 ENCOUNTER — TELEMEDICINE (OUTPATIENT)
Dept: MEDICAL GROUP | Age: 37
End: 2022-12-22
Payer: COMMERCIAL

## 2022-12-22 VITALS — WEIGHT: 175 LBS | TEMPERATURE: 98.5 F | HEIGHT: 62 IN | BODY MASS INDEX: 32.2 KG/M2

## 2022-12-22 DIAGNOSIS — E78.1 HYPERTRIGLYCERIDEMIA: ICD-10-CM

## 2022-12-22 DIAGNOSIS — U07.1 ACUTE COVID-19: ICD-10-CM

## 2022-12-22 DIAGNOSIS — E11.9 TYPE 2 DIABETES MELLITUS WITHOUT COMPLICATION, WITHOUT LONG-TERM CURRENT USE OF INSULIN (HCC): ICD-10-CM

## 2022-12-22 DIAGNOSIS — E78.5 DYSLIPIDEMIA: ICD-10-CM

## 2022-12-22 PROCEDURE — 99214 OFFICE O/P EST MOD 30 MIN: CPT | Mod: 95 | Performed by: FAMILY MEDICINE

## 2022-12-22 RX ORDER — CODEINE PHOSPHATE AND GUAIFENESIN 10; 100 MG/5ML; MG/5ML
5 SOLUTION ORAL EVERY 4 HOURS PRN
Qty: 140 ML | Refills: 0 | Status: SHIPPED | OUTPATIENT
Start: 2022-12-22 | End: 2022-12-29

## 2022-12-22 ASSESSMENT — FIBROSIS 4 INDEX: FIB4 SCORE: 0.38

## 2022-12-22 NOTE — PROGRESS NOTES
Virtual Visit: Established Patient   This visit was conducted via Zoom using secure and encrypted videoconferencing technology.   The patient was in their home in the [facility].    The patient's identity was confirmed and verbal consent was obtained for this virtual visit.     Subjective:   CC:   Chief Complaint   Patient presents with    Other     Tested positive for COVID on Monday.       Tested positive for Covid on 12/19/2022.   Started to have symptoms on 12/18/2022.   Body ache, chills, cough, chest pressure.   Fever 104F on 12/19/2022, symptoms improved with Tylenol.   Today, patient stated she is doing much better.  Negative fever, chill, nausea, vomiting, diarrhea, shortness of breath, dyspnea on exertion.  Negative history of tobacco abuse, secondhand smoke exposure, or chronic lung diseases.    Patient has chronic poorly controlled type 2 diabetes.  Patient is on metformin 1000 mg twice daily.  She was recently started on low-dose long-acting insulin and continuous glucose monitoring as she is trying to get pregnant.  Patient has not started on insulin due to problems with obtaining insulin needles.  She started continuous glucose monitoring.  Average home blood sugar is in the 180.     Patient also have chronic hyperlipidemia and hypertriglyceridemia.  She is not on medication for these conditions as she is trying to get pregnant.  She is working on dietary and lifestyle modification to improve them.    Current medicines (including changes today)  Current Outpatient Medications   Medication Sig Dispense Refill    guaifenesin-codeine (ROBITUSSIN AC) Solution oral solution Take 5 mL by mouth every four hours as needed for Cough for up to 7 days. 140 mL 0    metformin (GLUCOPHAGE) 1000 MG tablet Take 1 Tablet by mouth 2 times a day. 180 Tablet 0    Insulin Pen Needle 32 G x 4 mm (BD PEN NEEDLE WILLIAM 2ND GEN) 1 UNITS EVERY EVENING. 100 Each 1    Continuous Blood Gluc Sensor (DEXCOM G6 SENSOR) Misc Use 1  "sensor to test continously changing every 10 days 3 Each 6    VITAMIN D PO       insulin glargine 100 UNIT/ML Solution Pen-injector injection Inject 10 Units under the skin every evening. 9 mL 0    Prenatal Vit-Fe Fumarate-FA (PRENATAL VITAMINS PO) Take 1 Tab by mouth every morning.       No current facility-administered medications for this visit.       Patient Active Problem List    Diagnosis Date Noted    Hypertriglyceridemia 11/21/2022    Microalbuminuria 10/31/2022    Vitamin D insufficiency 10/31/2022    Thyroid nodule 12/07/2021    Type 2 diabetes mellitus without complication, without long-term current use of insulin ([facility]) 05/01/2018    Obesity (BMI 30.0-34.9) 05/01/2018    Dyslipidemia 05/01/2018        Objective:   Temp 36.9 °C (98.5 °F)   Ht 1.575 m (5' 2\")   Wt 79.4 kg (175 lb)   BMI 32.01 kg/m²     Physical Exam:  Constitutional: Alert, no distress, well-groomed.  Skin: No rashes in visible areas.  Eye: Round. Conjunctiva clear, lids normal. No icterus.   ENMT: Lips pink without lesions, good dentition, moist mucous membranes. Phonation normal.  Neck: No masses, no thyromegaly. Moves freely without pain.  Respiratory: Unlabored respiratory effort, no cough or audible wheeze  Psych: Alert and oriented x3, normal affect and mood.     Assessment and Plan:   The following treatment plan was discussed:     1. Acute COVID-19  Acute COVID-19 infection, onset about 4 days ago.  Symptoms are improving with conservative treatment.    She reports feeling much better today.  Recommended against Paxlovid as she is clinically improved.  - guaifenesin-codeine (ROBITUSSIN AC) Solution oral solution; Take 5 mL by mouth every four hours as needed for Cough for up to 7 days.  Dispense: 140 mL; Refill: 0  - follow CDC guideline for isolation and de-isolation   - conservative treatment recommended and discussed.   - indications for emergent care discussed.     2. Type 2 diabetes mellitus without complication, without " long-term current use of insulin (HCC)  Chronic, poorly controlled, last A1c was 7.9.  Patient is trying to get pregnant with IVF.  She needs to get her A1c down to 7.0 before embryo transplantation.  She is on metformin 1000 mg twice daily.  She wears continuous glucose monitoring.  Average blood sugar is about 180.  Patient was prescribed insulin at previous office visit, but has not started insulin.  - continue CGM  - Continue metformin 1000 mg twice daily.  - Start Lantus 10 units nightly  - dietary modification, exercise, weight loss  - Annual eye exam  - Regular foot exam  - Discussed prevention and management of hypoglycemia  - Side effects of all medications discussed with patient  - Follow up on 12/30/2022      3. Dyslipidemia  4. Hypertriglyceridemia  Chronic, unable to start pharmacotherapy as she is trying to get pregnant.  Recommended dietary and lifestyle modification to improve these conditions.   Appropriate counseling provided     Follow-up: Return in about 4 weeks (around 1/19/2023) for Diabetes.

## 2022-12-29 ENCOUNTER — HOSPITAL ENCOUNTER (OUTPATIENT)
Dept: LAB | Facility: MEDICAL CENTER | Age: 37
End: 2022-12-29
Attending: FAMILY MEDICINE
Payer: COMMERCIAL

## 2022-12-29 DIAGNOSIS — E11.9 TYPE 2 DIABETES MELLITUS WITHOUT COMPLICATION, WITHOUT LONG-TERM CURRENT USE OF INSULIN (HCC): ICD-10-CM

## 2022-12-29 DIAGNOSIS — E55.9 VITAMIN D INSUFFICIENCY: ICD-10-CM

## 2022-12-29 DIAGNOSIS — Z11.59 NEED FOR HEPATITIS C SCREENING TEST: ICD-10-CM

## 2022-12-29 LAB
25(OH)D3 SERPL-MCNC: 25 NG/ML (ref 30–100)
ALBUMIN SERPL BCP-MCNC: 4 G/DL (ref 3.2–4.9)
ALBUMIN/GLOB SERPL: 1.2 G/DL
ALP SERPL-CCNC: 108 U/L (ref 30–99)
ALT SERPL-CCNC: 22 U/L (ref 2–50)
ANION GAP SERPL CALC-SCNC: 11 MMOL/L (ref 7–16)
AST SERPL-CCNC: 13 U/L (ref 12–45)
BASOPHILS # BLD AUTO: 0.7 % (ref 0–1.8)
BASOPHILS # BLD: 0.06 K/UL (ref 0–0.12)
BILIRUB SERPL-MCNC: 0.5 MG/DL (ref 0.1–1.5)
BUN SERPL-MCNC: 11 MG/DL (ref 8–22)
CALCIUM ALBUM COR SERPL-MCNC: 9 MG/DL (ref 8.5–10.5)
CALCIUM SERPL-MCNC: 9 MG/DL (ref 8.5–10.5)
CHLORIDE SERPL-SCNC: 102 MMOL/L (ref 96–112)
CO2 SERPL-SCNC: 25 MMOL/L (ref 20–33)
CREAT SERPL-MCNC: 0.54 MG/DL (ref 0.5–1.4)
EOSINOPHIL # BLD AUTO: 0.32 K/UL (ref 0–0.51)
EOSINOPHIL NFR BLD: 3.8 % (ref 0–6.9)
ERYTHROCYTE [DISTWIDTH] IN BLOOD BY AUTOMATED COUNT: 41.5 FL (ref 35.9–50)
EST. AVERAGE GLUCOSE BLD GHB EST-MCNC: 194 MG/DL
GFR SERPLBLD CREATININE-BSD FMLA CKD-EPI: 120 ML/MIN/1.73 M 2
GLOBULIN SER CALC-MCNC: 3.4 G/DL (ref 1.9–3.5)
GLUCOSE SERPL-MCNC: 172 MG/DL (ref 65–99)
HBA1C MFR BLD: 8.4 % (ref 4–5.6)
HCT VFR BLD AUTO: 41.2 % (ref 37–47)
HCV AB SER QL: NORMAL
HGB BLD-MCNC: 13.5 G/DL (ref 12–16)
IMM GRANULOCYTES # BLD AUTO: 0.31 K/UL (ref 0–0.11)
IMM GRANULOCYTES NFR BLD AUTO: 3.7 % (ref 0–0.9)
LYMPHOCYTES # BLD AUTO: 3.26 K/UL (ref 1–4.8)
LYMPHOCYTES NFR BLD: 38.6 % (ref 22–41)
MCH RBC QN AUTO: 28.5 PG (ref 27–33)
MCHC RBC AUTO-ENTMCNC: 32.8 G/DL (ref 33.6–35)
MCV RBC AUTO: 86.9 FL (ref 81.4–97.8)
MONOCYTES # BLD AUTO: 0.72 K/UL (ref 0–0.85)
MONOCYTES NFR BLD AUTO: 8.5 % (ref 0–13.4)
NEUTROPHILS # BLD AUTO: 3.77 K/UL (ref 2–7.15)
NEUTROPHILS NFR BLD: 44.7 % (ref 44–72)
NRBC # BLD AUTO: 0 K/UL
NRBC BLD-RTO: 0 /100 WBC
PLATELET # BLD AUTO: 370 K/UL (ref 164–446)
PMV BLD AUTO: 10.2 FL (ref 9–12.9)
POTASSIUM SERPL-SCNC: 4.1 MMOL/L (ref 3.6–5.5)
PROT SERPL-MCNC: 7.4 G/DL (ref 6–8.2)
RBC # BLD AUTO: 4.74 M/UL (ref 4.2–5.4)
SODIUM SERPL-SCNC: 138 MMOL/L (ref 135–145)
WBC # BLD AUTO: 8.4 K/UL (ref 4.8–10.8)

## 2022-12-29 PROCEDURE — 83036 HEMOGLOBIN GLYCOSYLATED A1C: CPT

## 2022-12-29 PROCEDURE — 86803 HEPATITIS C AB TEST: CPT

## 2022-12-29 PROCEDURE — 82306 VITAMIN D 25 HYDROXY: CPT

## 2022-12-29 PROCEDURE — 85025 COMPLETE CBC W/AUTO DIFF WBC: CPT

## 2022-12-29 PROCEDURE — 80053 COMPREHEN METABOLIC PANEL: CPT

## 2022-12-29 PROCEDURE — 36415 COLL VENOUS BLD VENIPUNCTURE: CPT

## 2022-12-30 ENCOUNTER — OFFICE VISIT (OUTPATIENT)
Dept: MEDICAL GROUP | Age: 37
End: 2022-12-30
Payer: COMMERCIAL

## 2022-12-30 VITALS
HEART RATE: 92 BPM | SYSTOLIC BLOOD PRESSURE: 110 MMHG | DIASTOLIC BLOOD PRESSURE: 66 MMHG | TEMPERATURE: 98.4 F | HEIGHT: 62 IN | WEIGHT: 170 LBS | OXYGEN SATURATION: 94 % | BODY MASS INDEX: 31.28 KG/M2

## 2022-12-30 DIAGNOSIS — E55.9 VITAMIN D INSUFFICIENCY: ICD-10-CM

## 2022-12-30 DIAGNOSIS — R80.9 MICROALBUMINURIA: ICD-10-CM

## 2022-12-30 DIAGNOSIS — E66.9 OBESITY (BMI 30.0-34.9): ICD-10-CM

## 2022-12-30 DIAGNOSIS — E11.9 TYPE 2 DIABETES MELLITUS WITHOUT COMPLICATION, WITHOUT LONG-TERM CURRENT USE OF INSULIN (HCC): ICD-10-CM

## 2022-12-30 DIAGNOSIS — E78.5 DYSLIPIDEMIA: ICD-10-CM

## 2022-12-30 DIAGNOSIS — E78.1 HYPERTRIGLYCERIDEMIA: ICD-10-CM

## 2022-12-30 DIAGNOSIS — Z23 NEED FOR VACCINATION: ICD-10-CM

## 2022-12-30 PROCEDURE — 99214 OFFICE O/P EST MOD 30 MIN: CPT | Performed by: FAMILY MEDICINE

## 2022-12-30 RX ORDER — PROCHLORPERAZINE 25 MG/1
1 SUPPOSITORY RECTAL
Qty: 3 EACH | Refills: 6 | Status: SHIPPED | OUTPATIENT
Start: 2022-12-30 | End: 2023-01-16 | Stop reason: SDUPTHER

## 2022-12-30 ASSESSMENT — FIBROSIS 4 INDEX: FIB4 SCORE: 0.29

## 2022-12-31 NOTE — PROGRESS NOTES
"Subjective:   CC: diabetes follow up     HPI:     Stefania Renteria is a 37 y.o. female, established patient of the clinic.     Patient has chronic severe diabetes, hyperlipidemia, hypertriglyceridemia, vitamin D insufficiency.  Patient has been on metformin 1000 mg twice daily.   She recently started on Lantus 10 units nightly for about 10 days.  Patient is wearing CGM for about 4 weeks. Time in range is approximately 23%. Blood sugar appears to improve after starting insulin 10 days ago.  Negative hypoglycemia.    Patient is planning to get pregnant through IVF.  She needs to bring her A1c down to less than 7 before embryo transfer.  Her A1c was 8.4 on December 29, 2022.  Patient has appointment to follow-up with endocrinology in February 2022 for continued management of diabetes.    Current medicines (including changes today)  Current Outpatient Medications   Medication Sig Dispense Refill    Continuous Blood Gluc Sensor (DEXCOM G6 SENSOR) Misc Use 1 sensor to test continously changing every 10 days 3 Each 6    metformin (GLUCOPHAGE) 1000 MG tablet Take 1 Tablet by mouth 2 times a day. 180 Tablet 0    Insulin Pen Needle 32 G x 4 mm (BD PEN NEEDLE WILLIAM 2ND GEN) 1 UNITS EVERY EVENING. 100 Each 1    VITAMIN D PO       insulin glargine 100 UNIT/ML Solution Pen-injector injection Inject 10 Units under the skin every evening. 9 mL 0    Prenatal Vit-Fe Fumarate-FA (PRENATAL VITAMINS PO) Take 1 Tab by mouth every morning.       No current facility-administered medications for this visit.     She  has a past medical history of Diabetes (HCC), Obesity, PCOS (polycystic ovarian syndrome), and Pregnant.    I reviewed patient's problem list, allergies, medications, family hx, social hx with patient and update EPIC.        Objective:     /66 (BP Location: Left arm, Patient Position: Sitting, BP Cuff Size: Adult)   Pulse 92   Temp 36.9 °C (98.4 °F) (Temporal)   Ht 1.575 m (5' 2\")   Wt 77.1 kg (170 lb)   SpO2 94%  Body " mass index is 31.09 kg/m².    Physical Exam:  Constitutional: awake, alert, in no distress.  Skin: Warm, dry, good turgor, no rashes, bruises, ulcers in visible areas.  Eye: conjunctiva clear, lids neg for edema or lesions.  Neck: Trachea midline, no masses, no thyromegaly. No cervical or supraclavicular lymphadenopathy  Respiratory: Unlabored respiratory effort, lungs clear to auscultation, no wheezes, no rales.  Cardiovascular: Normal S1, S2, no murmur, no pedal edema.  Psych: Oriented x3, affect and mood wnl, intact judgement and insight.       Assessment and Plan:   The following treatment plan was discussed    1. Type 2 diabetes mellitus without complication, without long-term current use of insulin (HCC)  Chronic, poorly controlled, most recent A1c was 8.4 on 12/29/2022.  Patient is on metformin 1000 mg twice daily.  She is started on Lantus 10 units nightly 10 days ago.  She wears CGM for 4 weeks, time in range is about 23%  - HEMOGLOBIN A1C; Future  - CBC WITH DIFFERENTIAL; Future  - Comp Metabolic Panel; Future  - POCT Retinal Eye Exam  - Referral to Diabetic Education  - Continuous Blood Gluc Sensor (DEXCOM G6 SENSOR) Misc; Use 1 sensor to test continously changing every 10 days  Dispense: 3 Each; Refill: 6  - continue Metformin 1000 mg BID  - continue Lantus 10 units nightly, ok to increase Lantus to 15 units nightly if fasting blood sugar remains persistently above 130.   - patient will  follow up with endocrinology in 2/2023 for management of this condition.  - dietary modification, exercise, weight loss  - Annual eye exam  - Regular foot exam  - Discussed prevention and management of hypoglycemia  - Side effects of all medications discussed with patient  - Follow up in 6 months.     2. Dyslipidemia  3. Hypertriglyceridemia  Chronic hyperlipidemia with severe hypertriglyceridemia.   Pharmacotherapy not started as patient is planning to get pregnant.   - dietary modification, exercise, and weight loss.    - avoid alcohol, drugs, tobacco products   - Lipid Profile; Future    4. Microalbuminuria  - MICROALBUMIN CREAT RATIO URINE; Future    5. Vitamin D insufficiency  - continue 2000 units of vitamin D daily   - VITAMIN D,25 HYDROXY (DEFICIENCY); Future    6. Obesity (BMI 30.0-34.9)  - Patient identified as having weight management issue.  Appropriate orders and counseling given.     7. Need for vaccination  Patient declined vaccines today.        Saima Deshpande M.D.      Followup: Return in about 6 months (around 6/30/2023) for annual PE.    Please note that this dictation was created using voice recognition software. I have made every reasonable attempt to correct obvious errors, but I expect that there are errors of grammar and possibly content that I did not discover before finalizing the note.

## 2023-01-13 ENCOUNTER — PATIENT MESSAGE (OUTPATIENT)
Dept: MEDICAL GROUP | Age: 38
End: 2023-01-13
Payer: COMMERCIAL

## 2023-01-13 DIAGNOSIS — E11.9 TYPE 2 DIABETES MELLITUS WITHOUT COMPLICATION, WITHOUT LONG-TERM CURRENT USE OF INSULIN (HCC): ICD-10-CM

## 2023-01-16 RX ORDER — PROCHLORPERAZINE 25 MG/1
1 SUPPOSITORY RECTAL
Qty: 3 EACH | Refills: 6 | Status: SHIPPED | OUTPATIENT
Start: 2023-01-16 | End: 2023-05-23 | Stop reason: SDUPTHER

## 2023-01-20 RX ORDER — PROCHLORPERAZINE 25 MG/1
1 SUPPOSITORY RECTAL ONCE
Qty: 1 EACH | Refills: 3 | Status: SHIPPED | OUTPATIENT
Start: 2023-01-20 | End: 2023-01-20

## 2023-02-21 ENCOUNTER — OFFICE VISIT (OUTPATIENT)
Dept: ENDOCRINOLOGY | Facility: MEDICAL CENTER | Age: 38
End: 2023-02-21
Attending: INTERNAL MEDICINE
Payer: COMMERCIAL

## 2023-02-21 VITALS
HEART RATE: 99 BPM | DIASTOLIC BLOOD PRESSURE: 66 MMHG | HEIGHT: 62 IN | OXYGEN SATURATION: 97 % | BODY MASS INDEX: 33.07 KG/M2 | WEIGHT: 179.7 LBS | SYSTOLIC BLOOD PRESSURE: 102 MMHG

## 2023-02-21 DIAGNOSIS — E78.2 MIXED HYPERLIPIDEMIA: ICD-10-CM

## 2023-02-21 DIAGNOSIS — E11.65 TYPE 2 DIABETES MELLITUS WITH HYPERGLYCEMIA, WITHOUT LONG-TERM CURRENT USE OF INSULIN (HCC): ICD-10-CM

## 2023-02-21 DIAGNOSIS — E55.9 VITAMIN D DEFICIENCY: ICD-10-CM

## 2023-02-21 DIAGNOSIS — Z79.84 LONG TERM (CURRENT) USE OF ORAL HYPOGLYCEMIC DRUGS: ICD-10-CM

## 2023-02-21 LAB
HBA1C MFR BLD: 8.7 % (ref ?–5.8)
POCT INT CON NEG: NEGATIVE
POCT INT CON POS: POSITIVE

## 2023-02-21 PROCEDURE — 83036 HEMOGLOBIN GLYCOSYLATED A1C: CPT | Performed by: INTERNAL MEDICINE

## 2023-02-21 PROCEDURE — 99214 OFFICE O/P EST MOD 30 MIN: CPT | Performed by: INTERNAL MEDICINE

## 2023-02-21 PROCEDURE — 99212 OFFICE O/P EST SF 10 MIN: CPT | Performed by: INTERNAL MEDICINE

## 2023-02-21 PROCEDURE — 92250 FUNDUS PHOTOGRAPHY W/I&R: CPT | Performed by: INTERNAL MEDICINE

## 2023-02-21 RX ORDER — PROCHLORPERAZINE 25 MG/1
SUPPOSITORY RECTAL
COMMUNITY
Start: 2023-01-21 | End: 2023-05-23 | Stop reason: SDUPTHER

## 2023-02-21 RX ORDER — SEMAGLUTIDE 1.34 MG/ML
0.5 INJECTION, SOLUTION SUBCUTANEOUS
Qty: 1.5 ML | Refills: 5 | Status: SHIPPED | OUTPATIENT
Start: 2023-02-21 | End: 2023-02-21 | Stop reason: SDUPTHER

## 2023-02-21 RX ORDER — DAPAGLIFLOZIN 10 MG/1
10 TABLET, FILM COATED ORAL DAILY
Qty: 30 TABLET | Refills: 5 | Status: SHIPPED | OUTPATIENT
Start: 2023-02-21 | End: 2023-02-21 | Stop reason: SDUPTHER

## 2023-02-21 RX ORDER — SEMAGLUTIDE 1.34 MG/ML
0.5 INJECTION, SOLUTION SUBCUTANEOUS
Qty: 1.5 ML | Refills: 5 | Status: SHIPPED | OUTPATIENT
Start: 2023-02-21 | End: 2023-04-28

## 2023-02-21 RX ORDER — DAPAGLIFLOZIN 10 MG/1
10 TABLET, FILM COATED ORAL DAILY
Qty: 30 TABLET | Refills: 5 | Status: SHIPPED | OUTPATIENT
Start: 2023-02-21 | End: 2023-05-23 | Stop reason: SDUPTHER

## 2023-02-21 ASSESSMENT — PATIENT HEALTH QUESTIONNAIRE - PHQ9: CLINICAL INTERPRETATION OF PHQ2 SCORE: 0

## 2023-02-21 ASSESSMENT — FIBROSIS 4 INDEX: FIB4 SCORE: 0.29

## 2023-02-21 NOTE — PROGRESS NOTES
CHIEF COMPLAINT: Patient is here for follow up of Type 2 Diabetes Mellitus    HPI:     Stefania Renteria is a 37 y.o. female with Type 2 Diabetes Mellitus here for follow up.    Labs from 2/21/2023 HbA1c is 8.7%    Patient was initially seen once on 4/2020  but she then did not follow up for unclear reasons and she cancelled follow up visits on 7/2021 and Feb 2022        She is on Metformin 1000mg bid  She was prescribed basal insulin by another provider but she has not started it yet  She is afraid of taking insulin without having her glucose sensor being active      She has a Dexcom G6 CGM but is currently not active      She has mixed hyperlipidemia with elevated triglycerides but is not on fenofibrate or statin  We do not have updated labs for her lipids    Triglycerides were over 800 on June 2022  Direct LDL was not measured      She does have diabetic kidney disease  U ACR was 256 on June 2022  We do not have an updated panel    Point-of-care eye exam was completed today      BG Diary:02/21/23  Glucose sensor was not downloaded but patient was given  A link so we can download her glucose sensor in the future    Weight has been stable    Diabetes Complications   Retinopathy: No known retinopathy.  Last eye exam: Point-of-care eye exam was completed today  Neuropathy: Denies paresthesias or numbness in hands or feet. Denies any foot wounds.  Exercise: Minimal.  Diet: Fair.  Patient's medications, allergies, and social histories were reviewed and updated as appropriate.    ROS:     CONS:     No fever, no chills   EYES:     No diplopia, no blurry vision   CV:           No chest pain, no palpitations   PULM:     No SOB, no cough, no hemoptysis.   GI:            No nausea, no vomiting, no diarrhea, no constipation   ENDO:     No polyuria, no polydipsia, no heat intolerance, no cold intolerance       Past Medical History:  Problem List:  2022-11: Hypertriglyceridemia  2022-10: Microalbuminuria  2022-10: Vitamin D  "insufficiency  2021: Thyroid nodule  2021: Pain associated with wound  2020: Long-term insulin use ([facility])  2018: Type 2 diabetes mellitus without complication, without long-  term current use of insulin ([facility])  2018: Obesity (BMI 30.0-34.9)  2018: Health care maintenance  2018: Dyslipidemia      Past Surgical History:  Past Surgical History:   Procedure Laterality Date    PRIMARY C SECTION N/A 2021    Procedure:  SECTION, PRIMARY;  Surgeon: Ngoc Coon M.D.;  Location: SURGERY LABOR AND DELIVERY;  Service: Labor and Delivery    CERVICAL CERCLAGE  2020    Procedure: CERCLAGE, CERVIX;  Surgeon: Muna Carballo M.D.;  Location: LABOR AND DELIVERY;  Service: Obstetrics    CHOLECYSTECTOMY          Allergies:  Patient has no known allergies.     Social History:  Social History     Tobacco Use    Smoking status: Never    Smokeless tobacco: Never   Vaping Use    Vaping Use: Never used   Substance Use Topics    Alcohol use: Yes     Comment: occ    Drug use: No        Family History:   family history includes Diabetes in her brother and father; Hyperlipidemia in her father; Hypertension in her mother; No Known Problems in her sister.      PHYSICAL EXAM:   OBJECTIVE:  Vital signs: /66 (BP Location: Right arm, Patient Position: Sitting)   Pulse 99   Ht 1.575 m (5' 2\")   Wt 81.5 kg (179 lb 11.2 oz)   SpO2 97%   BMI 32.87 kg/m²   GENERAL: Well-developed, well-nourished in no apparent distress.   EYE:  No ocular asymmetry, PERRLA  HENT: Pink, moist mucous membranes.    NECK: No thyromegaly.   CARDIOVASCULAR:  No murmurs  LUNGS: Clear breath sounds  ABDOMEN: Soft, nontender   EXTREMITIES: No clubbing, cyanosis, or edema.   NEUROLOGICAL: No gross focal motor abnormalities   LYMPH: No cervical adenopathy palpated.   SKIN: No rashes, lesions.     Labs:  Lab Results   Component Value Date/Time    HBA1C 8.7 (A) 2023 03:27 PM        Lab Results   Component Value Date/Time    WBC " 8.4 12/29/2022 06:45 AM    RBC 4.74 12/29/2022 06:45 AM    HEMOGLOBIN 13.5 12/29/2022 06:45 AM    MCV 86.9 12/29/2022 06:45 AM    MCH 28.5 12/29/2022 06:45 AM    MCHC 32.8 (L) 12/29/2022 06:45 AM    RDW 41.5 12/29/2022 06:45 AM    MPV 10.2 12/29/2022 06:45 AM       Lab Results   Component Value Date/Time    SODIUM 138 12/29/2022 06:45 AM    POTASSIUM 4.1 12/29/2022 06:45 AM    CHLORIDE 102 12/29/2022 06:45 AM    CO2 25 12/29/2022 06:45 AM    ANION 11.0 12/29/2022 06:45 AM    GLUCOSE 172 (H) 12/29/2022 06:45 AM    BUN 11 12/29/2022 06:45 AM    CREATININE 0.54 12/29/2022 06:45 AM    CALCIUM 9.0 12/29/2022 06:45 AM    ASTSGOT 13 12/29/2022 06:45 AM    ALTSGPT 22 12/29/2022 06:45 AM    TBILIRUBIN 0.5 12/29/2022 06:45 AM    ALBUMIN 4.0 12/29/2022 06:45 AM    TOTPROTEIN 7.4 12/29/2022 06:45 AM    GLOBULIN 3.4 12/29/2022 06:45 AM    AGRATIO 1.2 12/29/2022 06:45 AM       Lab Results   Component Value Date/Time    CHOLSTRLTOT 244 (H) 06/20/2022 0952    TRIGLYCERIDE 816 (H) 06/20/2022 0952    HDL 32 (A) 06/20/2022 0952    LDL see below 06/20/2022 0952       Lab Results   Component Value Date/Time    MALBCRT 256 (H) 06/20/2022 09:52 AM    MICROALBUR 15.5 06/20/2022 09:52 AM    MICRALB 106.9 10/12/2019 05:39 AM        Lab Results   Component Value Date/Time    TSHULTRASEN 1.500 09/14/2022 0707     No results found for: FREEDIR  No results found for: FREET3  No results found for: THYSTIMIG        ASSESSMENT/PLAN:     1. Type 2 diabetes mellitus with hyperglycemia, without long-term current use of insulin (HCC)  Uncontrolled secondary to hyperglycemia and lack of follow-up  Continue metformin at 1000 mg twice a day  Start Ozempic 0.25 mg once a week  Increase to 0.5 mg weekly after 1 month  Start Farxiga 10 mg daily  Reviewed side effects benefits and alternatives  Recommend adequate hydration  Recommend that she get updated labs for her serum creatinine lipids TSH and urine albumin  Eye exam was completed today  Follow-up in 3  months with repeat of A1c in the office    2. Mixed hyperlipidemia  Stable  We will check fasting lipids this week and I will update her    3. Vitamin D deficiency  Stable we will check calcium vitamin D with upcoming labs    4. Long term (current) use of oral hypoglycemic drugs  Patient is on oral agents for type 2 diabetes      Return in about 3 months (around 5/21/2023).      This patient during there office visit today was started on a new medication.  Side effects of the new medication were discussed with the patient today in the office.     Thank you kindly for allowing me to participate in the diabetes care plan for this patient.    Esvin Oseguera MD, FACE, [facility]  02/21/23    CC:   Saima Deshpande M.D.

## 2023-02-23 LAB — RETINAL SCREEN: NEGATIVE

## 2023-03-13 DIAGNOSIS — E11.9 TYPE 2 DIABETES MELLITUS WITHOUT COMPLICATION, WITHOUT LONG-TERM CURRENT USE OF INSULIN (HCC): ICD-10-CM

## 2023-03-13 RX ORDER — INSULIN GLARGINE 100 [IU]/ML
10 INJECTION, SOLUTION SUBCUTANEOUS EVERY EVENING
Qty: 9 ML | Refills: 0 | Status: SHIPPED | OUTPATIENT
Start: 2023-03-13 | End: 2023-06-11

## 2023-04-28 DIAGNOSIS — E11.65 TYPE 2 DIABETES MELLITUS WITH HYPERGLYCEMIA, WITHOUT LONG-TERM CURRENT USE OF INSULIN (HCC): ICD-10-CM

## 2023-04-28 RX ORDER — SEMAGLUTIDE 0.68 MG/ML
0.5 INJECTION, SOLUTION SUBCUTANEOUS
Qty: 3 ML | Refills: 5 | Status: SHIPPED | OUTPATIENT
Start: 2023-04-28 | End: 2023-05-23 | Stop reason: SDUPTHER

## 2023-05-20 ENCOUNTER — HOSPITAL ENCOUNTER (OUTPATIENT)
Dept: LAB | Facility: MEDICAL CENTER | Age: 38
End: 2023-05-20
Attending: INTERNAL MEDICINE
Payer: COMMERCIAL

## 2023-05-20 DIAGNOSIS — E55.9 VITAMIN D DEFICIENCY: ICD-10-CM

## 2023-05-20 DIAGNOSIS — E78.2 MIXED HYPERLIPIDEMIA: ICD-10-CM

## 2023-05-20 DIAGNOSIS — Z79.84 LONG TERM (CURRENT) USE OF ORAL HYPOGLYCEMIC DRUGS: ICD-10-CM

## 2023-05-20 DIAGNOSIS — E11.65 TYPE 2 DIABETES MELLITUS WITH HYPERGLYCEMIA, WITHOUT LONG-TERM CURRENT USE OF INSULIN (HCC): ICD-10-CM

## 2023-05-20 LAB
25(OH)D3 SERPL-MCNC: 40 NG/ML (ref 30–100)
ALBUMIN SERPL BCP-MCNC: 4 G/DL (ref 3.2–4.9)
ALBUMIN/GLOB SERPL: 1.1 G/DL
ALP SERPL-CCNC: 80 U/L (ref 30–99)
ALT SERPL-CCNC: 18 U/L (ref 2–50)
ANION GAP SERPL CALC-SCNC: 10 MMOL/L (ref 7–16)
AST SERPL-CCNC: 12 U/L (ref 12–45)
BILIRUB SERPL-MCNC: 0.4 MG/DL (ref 0.1–1.5)
BUN SERPL-MCNC: 10 MG/DL (ref 8–22)
CALCIUM ALBUM COR SERPL-MCNC: 8.7 MG/DL (ref 8.5–10.5)
CALCIUM SERPL-MCNC: 8.7 MG/DL (ref 8.5–10.5)
CHLORIDE SERPL-SCNC: 105 MMOL/L (ref 96–112)
CHOLEST SERPL-MCNC: 176 MG/DL (ref 100–199)
CO2 SERPL-SCNC: 22 MMOL/L (ref 20–33)
CREAT SERPL-MCNC: 0.47 MG/DL (ref 0.5–1.4)
CREAT UR-MCNC: 120.09 MG/DL
GFR SERPLBLD CREATININE-BSD FMLA CKD-EPI: 123 ML/MIN/1.73 M 2
GLOBULIN SER CALC-MCNC: 3.5 G/DL (ref 1.9–3.5)
GLUCOSE SERPL-MCNC: 108 MG/DL (ref 65–99)
HDLC SERPL-MCNC: 41 MG/DL
LDLC SERPL CALC-MCNC: 111 MG/DL
MICROALBUMIN UR-MCNC: 9.4 MG/DL
MICROALBUMIN/CREAT UR: 78 MG/G (ref 0–30)
POTASSIUM SERPL-SCNC: 3.8 MMOL/L (ref 3.6–5.5)
PROT SERPL-MCNC: 7.5 G/DL (ref 6–8.2)
SODIUM SERPL-SCNC: 137 MMOL/L (ref 135–145)
T4 FREE SERPL-MCNC: 1.39 NG/DL (ref 0.93–1.7)
TRIGL SERPL-MCNC: 122 MG/DL (ref 0–149)
TSH SERPL DL<=0.005 MIU/L-ACNC: 0.91 UIU/ML (ref 0.38–5.33)

## 2023-05-20 PROCEDURE — 36415 COLL VENOUS BLD VENIPUNCTURE: CPT

## 2023-05-20 PROCEDURE — 82043 UR ALBUMIN QUANTITATIVE: CPT

## 2023-05-20 PROCEDURE — 80053 COMPREHEN METABOLIC PANEL: CPT

## 2023-05-20 PROCEDURE — 84443 ASSAY THYROID STIM HORMONE: CPT

## 2023-05-20 PROCEDURE — 80061 LIPID PANEL: CPT

## 2023-05-20 PROCEDURE — 82570 ASSAY OF URINE CREATININE: CPT

## 2023-05-20 PROCEDURE — 84681 ASSAY OF C-PEPTIDE: CPT

## 2023-05-20 PROCEDURE — 84439 ASSAY OF FREE THYROXINE: CPT

## 2023-05-20 PROCEDURE — 82306 VITAMIN D 25 HYDROXY: CPT

## 2023-05-20 PROCEDURE — 86341 ISLET CELL ANTIBODY: CPT

## 2023-05-23 ENCOUNTER — NON-PROVIDER VISIT (OUTPATIENT)
Dept: ENDOCRINOLOGY | Facility: MEDICAL CENTER | Age: 38
End: 2023-05-23
Attending: INTERNAL MEDICINE
Payer: COMMERCIAL

## 2023-05-23 VITALS
BODY MASS INDEX: 31.1 KG/M2 | HEART RATE: 84 BPM | SYSTOLIC BLOOD PRESSURE: 108 MMHG | WEIGHT: 169 LBS | DIASTOLIC BLOOD PRESSURE: 70 MMHG | OXYGEN SATURATION: 100 % | HEIGHT: 62 IN

## 2023-05-23 DIAGNOSIS — E11.65 TYPE 2 DIABETES MELLITUS WITH HYPERGLYCEMIA, WITHOUT LONG-TERM CURRENT USE OF INSULIN (HCC): ICD-10-CM

## 2023-05-23 DIAGNOSIS — E11.9 TYPE 2 DIABETES MELLITUS WITHOUT COMPLICATION, WITHOUT LONG-TERM CURRENT USE OF INSULIN (HCC): ICD-10-CM

## 2023-05-23 LAB
C PEPTIDE SERPL-MCNC: 1.4 NG/ML (ref 0.5–3.3)
HBA1C MFR BLD: 6.1 % (ref ?–5.8)
POCT INT CON NEG: NEGATIVE
POCT INT CON POS: POSITIVE

## 2023-05-23 PROCEDURE — 83036 HEMOGLOBIN GLYCOSYLATED A1C: CPT

## 2023-05-23 PROCEDURE — 99212 OFFICE O/P EST SF 10 MIN: CPT | Performed by: INTERNAL MEDICINE

## 2023-05-23 RX ORDER — PROCHLORPERAZINE 25 MG/1
1 SUPPOSITORY RECTAL
Qty: 9 EACH | Refills: 3 | Status: SHIPPED | OUTPATIENT
Start: 2023-05-23 | End: 2023-11-30

## 2023-05-23 RX ORDER — PROCHLORPERAZINE 25 MG/1
1 SUPPOSITORY RECTAL
Qty: 1 EACH | Refills: 3 | Status: SHIPPED | OUTPATIENT
Start: 2023-05-23 | End: 2023-11-30

## 2023-05-23 RX ORDER — DAPAGLIFLOZIN 10 MG/1
10 TABLET, FILM COATED ORAL DAILY
Qty: 90 TABLET | Refills: 3 | Status: SHIPPED | OUTPATIENT
Start: 2023-05-23 | End: 2023-09-25 | Stop reason: SDUPTHER

## 2023-05-23 RX ORDER — SEMAGLUTIDE 0.68 MG/ML
0.5 INJECTION, SOLUTION SUBCUTANEOUS
Qty: 9 ML | Refills: 3 | Status: SHIPPED | OUTPATIENT
Start: 2023-05-23 | End: 2023-09-25 | Stop reason: SDUPTHER

## 2023-05-23 ASSESSMENT — FIBROSIS 4 INDEX: FIB4 SCORE: 0.31

## 2023-05-23 NOTE — PROGRESS NOTES
RN-CDE Note    Subjective:   Endocrinology Clinic Progress Note  PCP: Saima Deshpande M.D.    HPI:  Stefania Renteria is a 38 y.o. old patient who is seen today by the Diabetes Nurse Specialist for review of Type 2 Diabetes.  Recent changes in health: Having a embryo transfer in August.  DM:   Last A1c:   Lab Results   Component Value Date/Time    HBA1C 6.1 (A) 05/23/2023 09:51 AM      Previous A1c was 8.7 on 2/21/23  A1C GOAL: < 7    Diabetes Medications:   Basaglar 15 units daily  Metformin 1000 mg BID  Ozempic    mg weekly  Farxiga 10 mg daily      Exercise: Running after 2 year old daughter  Diet: Low carbohydrates in the morning and lunch.  Dinner she goes up to 45-60 grams.  Patient's body mass index is 30.91 kg/m². Exercise and nutrition counseling were performed at this visit.    Glucose monitoring frequency: See Dexcom download    Hypoglycemic episodes: no  Last Retinal Exam: on file and up-to-date  Daily Foot Exam: Yes   Foot Exam:  Monofilament: done  Monofilament testing with a 10 gram force: sensation intact: intact bilaterally  Visual Inspection: Feet without maceration, ulcers, fissures.  Pedal pulses: intact bilaterally   Lab Results   Component Value Date/Time    MALBCRT 78 (H) 05/20/2023 09:10 AM    MICROALBUR 9.4 05/20/2023 09:10 AM    MICRALB 106.9 10/12/2019 05:39 AM        ACR Albumin/Creatinine Ratio goal <30     HTN:   Blood pressure goal <130/<80 .   Currently Rx ACE/ARB: No     Dyslipidemia:    Lab Results   Component Value Date/Time    CHOLSTRLTOT 176 05/20/2023 09:13 AM     (H) 05/20/2023 09:13 AM    HDL 41 05/20/2023 09:13 AM    TRIGLYCERIDE 122 05/20/2023 09:13 AM         Currently Rx Statin: No     She  reports that she has never smoked. She has never used smokeless tobacco.      Plan:     Discussed and educated on:   - All medications, side effects and compliance (discussed carefully)  - Annual eye examinations at Ophthalmology  - Home glucose monitoring emphasized  - Weight control and  daily exercise    Recommended medication changes: She will stay on her current medications until August and then she will need to stop the Ozempic and Farxiga and we will adjust her insulin as needed.  She will follow up in 3 months before her embryo transfer.  Farxiga 10 mg sampled x4 ( [id number]   expir. 7/25).

## 2023-05-24 LAB — GAD65 AB SER IA-ACNC: <5 IU/ML (ref 0–5)

## 2023-07-21 ENCOUNTER — TELEPHONE (OUTPATIENT)
Dept: PHARMACY | Facility: MEDICAL CENTER | Age: 38
End: 2023-07-21
Payer: COMMERCIAL

## 2023-07-21 DIAGNOSIS — E11.65 TYPE 2 DIABETES MELLITUS WITH HYPERGLYCEMIA, WITHOUT LONG-TERM CURRENT USE OF INSULIN (HCC): ICD-10-CM

## 2023-07-21 RX ORDER — INSULIN GLARGINE 100 [IU]/ML
30 INJECTION, SOLUTION SUBCUTANEOUS EVERY EVENING
Qty: 30 ML | Refills: 3 | Status: SHIPPED | OUTPATIENT
Start: 2023-07-21 | End: 2024-01-22 | Stop reason: SDUPTHER

## 2023-07-21 NOTE — TELEPHONE ENCOUNTER
Patient called me back and declined our services    LVM to offer our Care Model Services     Kerry Slaughter, Pharmacy Liasion/RX Coordinator

## 2023-08-11 ENCOUNTER — HOSPITAL ENCOUNTER (OUTPATIENT)
Facility: MEDICAL CENTER | Age: 38
End: 2023-08-11
Attending: OBSTETRICS & GYNECOLOGY
Payer: COMMERCIAL

## 2023-08-11 LAB
AMBIGUOUS DTTM AMBI4: NORMAL
HBV SURFACE AB SERPL IA-ACNC: 9341 MIU/ML (ref 0–10)
HBV SURFACE AG SER QL: NORMAL
HCV AB SER QL: NORMAL
HIV 1+2 AB+HIV1 P24 AG SERPL QL IA: NORMAL
T PALLIDUM AB SER QL IA: NORMAL
TSH SERPL DL<=0.005 MIU/L-ACNC: 0.74 UIU/ML (ref 0.38–5.33)

## 2023-08-11 PROCEDURE — 87491 CHLMYD TRACH DNA AMP PROBE: CPT

## 2023-08-11 PROCEDURE — 84146 ASSAY OF PROLACTIN: CPT

## 2023-08-11 PROCEDURE — 86780 TREPONEMA PALLIDUM: CPT

## 2023-08-11 PROCEDURE — 86706 HEP B SURFACE ANTIBODY: CPT

## 2023-08-11 PROCEDURE — 86803 HEPATITIS C AB TEST: CPT

## 2023-08-11 PROCEDURE — 87389 HIV-1 AG W/HIV-1&-2 AB AG IA: CPT

## 2023-08-11 PROCEDURE — 87591 N.GONORRHOEAE DNA AMP PROB: CPT

## 2023-08-11 PROCEDURE — 84443 ASSAY THYROID STIM HORMONE: CPT

## 2023-08-11 PROCEDURE — 87340 HEPATITIS B SURFACE AG IA: CPT

## 2023-08-12 LAB
C TRACH DNA SPEC QL NAA+PROBE: NEGATIVE
N GONORRHOEA DNA SPEC QL NAA+PROBE: NEGATIVE
PROLACTIN SERPL-MCNC: 10.5 NG/ML (ref 2.8–26)
SPECIMEN SOURCE: NORMAL

## 2023-08-18 ENCOUNTER — NON-PROVIDER VISIT (OUTPATIENT)
Dept: ENDOCRINOLOGY | Facility: MEDICAL CENTER | Age: 38
End: 2023-08-18
Attending: INTERNAL MEDICINE
Payer: COMMERCIAL

## 2023-08-18 DIAGNOSIS — E11.65 TYPE 2 DIABETES MELLITUS WITH HYPERGLYCEMIA, WITHOUT LONG-TERM CURRENT USE OF INSULIN (HCC): ICD-10-CM

## 2023-08-18 LAB
HBA1C MFR BLD: 6.9 % (ref ?–5.8)
POCT INT CON NEG: NEGATIVE
POCT INT CON POS: POSITIVE

## 2023-08-18 PROCEDURE — 83036 HEMOGLOBIN GLYCOSYLATED A1C: CPT

## 2023-08-18 PROCEDURE — 99213 OFFICE O/P EST LOW 20 MIN: CPT | Performed by: INTERNAL MEDICINE

## 2023-08-18 RX ORDER — INSULIN ASPART 100 [IU]/ML
15 INJECTION, SOLUTION INTRAVENOUS; SUBCUTANEOUS
Qty: 45 ML | Refills: 3 | Status: SHIPPED | OUTPATIENT
Start: 2023-08-18 | End: 2023-08-29 | Stop reason: SDUPTHER

## 2023-08-18 NOTE — PROGRESS NOTES
RN-CDE Note    Subjective:   Endocrinology Clinic Progress Note  PCP: Saima Deshpande M.D.    HPI:  Stefania Renteria is a 38 y.o. old patient who is seen today by the Diabetes Nurse Specialist for review of Type 2 Diabetes.  Recent changes in health: She is getting ready for the Embro transfer on 9/1/23.  She states her blood sugars went up with all the fertility hormones.    DM:   Last A1c:   Lab Results   Component Value Date/Time    HBA1C 6.9 (A) 08/18/2023 10:07 AM      Previous A1c was 6.1 on 5/23/23  A1C GOAL: < 7    Diabetes Medications:   Farxiga 10 mg daily  Ozempic .5 mg weekly  Glargine 15 units daily  Metformin 1000 mg BID      Exercise: Walking and caring for 2 year old.  Diet: Breakfast is toast, sausage, and eggs.  Lunch  and dinner is salad/vegetable, protein, carbohydrate and dessert.  Patient's body mass index is unknown because there is no height or weight on file. Exercise and nutrition counseling were performed at this visit.    Glucose monitoring frequency: Dexcom download    Hypoglycemic episodes: no  Last Retinal Exam: on file and up-to-date  Daily Foot Exam: Yes   Foot Exam:  Monofilament: done  Monofilament testing with a 10 gram force: sensation intact: intact bilaterally  Visual Inspection: Feet without maceration, ulcers, fissures.  Pedal pulses: intact bilaterally   Lab Results   Component Value Date/Time    MALBCRT 78 (H) 05/20/2023 09:10 AM    MICROALBUR 9.4 05/20/2023 09:10 AM    MICRALB 106.9 10/12/2019 05:39 AM        ACR Albumin/Creatinine Ratio goal <30     HTN:   Blood pressure goal <130/<80 .   Currently Rx ACE/ARB: No     Dyslipidemia:    Lab Results   Component Value Date/Time    CHOLSTRLTOT 176 05/20/2023 09:13 AM     (H) 05/20/2023 09:13 AM    HDL 41 05/20/2023 09:13 AM    TRIGLYCERIDE 122 05/20/2023 09:13 AM         Currently Rx Statin: Yes     She  reports that she has never smoked. She has never used smokeless tobacco.      Plan:     Discussed and educated on:   - All  medications, side effects and compliance (discussed carefully)  - Annual eye examinations at Ophthalmology  - Home glucose monitoring emphasized  - Weight control and daily exercise    Recommended medication changes: She was given the Gestational Diabetes meal plan and it was reviewed with her.  She will increase her Basaglar to 17 units.  She will stop her Farxiga and Ozempic.  She will start Novolog 3 units before meals if needed and adjust as needed to keep her 1 hour after meal blood sugars under 130.  She will follow up with Dr. Oseguera in 3 months.

## 2023-08-22 DIAGNOSIS — E11.65 TYPE 2 DIABETES MELLITUS WITH HYPERGLYCEMIA, WITHOUT LONG-TERM CURRENT USE OF INSULIN (HCC): ICD-10-CM

## 2023-08-22 RX ORDER — INSULIN LISPRO 100 [IU]/ML
15 INJECTION, SOLUTION INTRAVENOUS; SUBCUTANEOUS
Qty: 45 ML | Refills: 3 | Status: SHIPPED | OUTPATIENT
Start: 2023-08-22

## 2023-08-28 DIAGNOSIS — E11.65 TYPE 2 DIABETES MELLITUS WITH HYPERGLYCEMIA, WITHOUT LONG-TERM CURRENT USE OF INSULIN (HCC): ICD-10-CM

## 2023-08-29 ENCOUNTER — DOCUMENTATION (OUTPATIENT)
Dept: PHARMACY | Facility: MEDICAL CENTER | Age: 38
End: 2023-08-29
Payer: COMMERCIAL

## 2023-08-29 DIAGNOSIS — E11.65 TYPE 2 DIABETES MELLITUS WITH HYPERGLYCEMIA, WITHOUT LONG-TERM CURRENT USE OF INSULIN (HCC): ICD-10-CM

## 2023-08-29 RX ORDER — INSULIN ASPART 100 [IU]/ML
15 INJECTION, SOLUTION INTRAVENOUS; SUBCUTANEOUS
Qty: 45 ML | Refills: 3 | Status: SHIPPED | OUTPATIENT
Start: 2023-08-29 | End: 2024-01-22 | Stop reason: SDUPTHER

## 2023-08-29 RX ORDER — INSULIN ASPART 100 [IU]/ML
15 INJECTION, SOLUTION INTRAVENOUS; SUBCUTANEOUS
Qty: 45 ML | Refills: 3 | Status: SHIPPED | OUTPATIENT
Start: 2023-08-29 | End: 2023-08-31 | Stop reason: SDUPTHER

## 2023-08-29 NOTE — PROGRESS NOTES
Received patient message from medical staff regarding [facility] needing to change medication from Novolog to Humalog due to plan protocols.    I submitted PA to [facility] key# [id number].    Once clinical questions loaded, insurance informed me that Humalog was not preferred and the NOVOLOG is the preferred medication on patient's plan.         Ran Test claim for Novolog and received paid claim for both 27 days and

## 2023-08-30 ENCOUNTER — TELEPHONE (OUTPATIENT)
Dept: PHARMACY | Facility: MEDICAL CENTER | Age: 38
End: 2023-08-30
Payer: COMMERCIAL

## 2023-08-30 NOTE — TELEPHONE ENCOUNTER
Patient called she was returning ElizabethRose call I told her Carlie was off today ask how I could help her she said she couldn't understand exactly  the voice mail Carlie left, I looked for some documentation from Carlie in TRX and Epic but found nothing, I asked her if there was a Medication she was wait for she yes yes her Humalog she has been our for 2 weeks it needed a PA, I looked into it and saw that [facility] had some documentation on the PA, I told her the doctor changed to Novolog Flexpen and the RX was sent to [facility] on [address] she said if I can send it to [facility]/pharmacy #[id number] - [place], [place] - [address] I told her I would asked Dr. Oseguera to re-send it.    Kerry Slaughter, Pharmacy Liaison/ RX Coordinator

## 2023-08-31 DIAGNOSIS — E11.65 TYPE 2 DIABETES MELLITUS WITH HYPERGLYCEMIA, WITHOUT LONG-TERM CURRENT USE OF INSULIN (HCC): ICD-10-CM

## 2023-08-31 RX ORDER — INSULIN ASPART 100 [IU]/ML
15 INJECTION, SOLUTION INTRAVENOUS; SUBCUTANEOUS
Qty: 45 ML | Refills: 3 | Status: SHIPPED | OUTPATIENT
Start: 2023-08-31 | End: 2023-08-31

## 2023-08-31 RX ORDER — INSULIN ASPART 100 [IU]/ML
15 INJECTION, SOLUTION INTRAVENOUS; SUBCUTANEOUS
Qty: 45 ML | Refills: 2 | Status: SHIPPED | OUTPATIENT
Start: 2023-08-31

## 2023-09-25 DIAGNOSIS — E11.65 TYPE 2 DIABETES MELLITUS WITH HYPERGLYCEMIA, WITHOUT LONG-TERM CURRENT USE OF INSULIN (HCC): ICD-10-CM

## 2023-09-25 RX ORDER — SEMAGLUTIDE 0.68 MG/ML
0.5 INJECTION, SOLUTION SUBCUTANEOUS
Qty: 9 ML | Refills: 3 | Status: SHIPPED | OUTPATIENT
Start: 2023-09-25

## 2023-09-25 RX ORDER — DAPAGLIFLOZIN 10 MG/1
10 TABLET, FILM COATED ORAL DAILY
Qty: 90 TABLET | Refills: 3 | Status: SHIPPED | OUTPATIENT
Start: 2023-09-25 | End: 2023-10-30 | Stop reason: SDUPTHER

## 2023-09-26 ENCOUNTER — TELEPHONE (OUTPATIENT)
Dept: ENDOCRINOLOGY | Facility: MEDICAL CENTER | Age: 38
End: 2023-09-26
Payer: COMMERCIAL

## 2023-09-26 NOTE — TELEPHONE ENCOUNTER
Received Refill PA request via [facility]  for Farxiga 10mg tablets    (Quantity:30, Day Supply:30)  (Quantity:90, Day Supply:90)    Insurance: [employer]  Member ID:[id number]  BIN: [id number]  PCN: [id number]  Group: [id number]     Ran Test claim via [place]    Pharmacy on File  [facility]/PHARMACY #[id number] - [place], [place] - [address]    Sent to outreach................

## 2023-09-26 NOTE — TELEPHONE ENCOUNTER
Received Refill PA request via MSOT  for Ozempic 0.25-0.5mg/dose 2mg/3ml Sol Pen-inj     (Quantity:3mls, Day Supply:28)-$0  (Quantity:9mls, Day Supply:84)-$0    Insurance: [employer] [facility]  Member ID:[id number]  BIN: [id number]  PCN: [id number]   Group: [id number]     Ran Test claim via [place]    Pharmacy on File  [facility]/pharmacy #[id number] [address] [place] [address] [address]  Phone: [phone]    Fax: [phone]     Sent to outreach................

## 2023-10-30 DIAGNOSIS — E11.65 TYPE 2 DIABETES MELLITUS WITH HYPERGLYCEMIA, WITHOUT LONG-TERM CURRENT USE OF INSULIN (HCC): ICD-10-CM

## 2023-10-31 ENCOUNTER — TELEPHONE (OUTPATIENT)
Dept: ENDOCRINOLOGY | Facility: MEDICAL CENTER | Age: 38
End: 2023-10-31
Payer: COMMERCIAL

## 2023-10-31 ENCOUNTER — PATIENT MESSAGE (OUTPATIENT)
Dept: ENDOCRINOLOGY | Facility: MEDICAL CENTER | Age: 38
End: 2023-10-31
Payer: COMMERCIAL

## 2023-10-31 DIAGNOSIS — E11.65 TYPE 2 DIABETES MELLITUS WITH HYPERGLYCEMIA, WITHOUT LONG-TERM CURRENT USE OF INSULIN (HCC): ICD-10-CM

## 2023-10-31 RX ORDER — DAPAGLIFLOZIN 10 MG/1
10 TABLET, FILM COATED ORAL DAILY
Qty: 90 TABLET | Refills: 3 | Status: SHIPPED | OUTPATIENT
Start: 2023-10-31 | End: 2024-03-07

## 2023-10-31 RX ORDER — DAPAGLIFLOZIN 10 MG/1
10 TABLET, FILM COATED ORAL DAILY
Qty: 90 TABLET | Refills: 3 | Status: SHIPPED | OUTPATIENT
Start: 2023-10-31 | End: 2023-10-31 | Stop reason: SDUPTHER

## 2023-10-31 NOTE — TELEPHONE ENCOUNTER
Called patient at [phone] to verify what pharmacy she would like her Farxiga prescription to be sent to. Patient stated that she is currently visiting her mother in [place] and would like for her prescription to be sent to [facility] inside the [facility] [place] [address] [place] [address].     Prescription was released to [facility] [id number] [facility] - [place], [place] - [address] Phone: [phone]    Angelique Slaughter  RX Coordinator/Liaison

## 2023-10-31 NOTE — TELEPHONE ENCOUNTER
Received Refill PA request via MSOT  for Farxiga 10mg tablets     (Quantity:30, Day Supply:30)-$0  (Quantity:90, Day Supply:90)-$0     Insurance: [facility] [employer]  Member ID:[id number]  BIN: [id number]  PCN: [id number]  Group: [id number]      Ran Test claim via [place]     Pharmacy on File  [facility]/PHARMACY #[id number] - [place], [place] - [address]     Sent to outreach................

## 2023-11-28 ENCOUNTER — HOSPITAL ENCOUNTER (EMERGENCY)
Facility: MEDICAL CENTER | Age: 38
End: 2023-11-28
Attending: EMERGENCY MEDICINE
Payer: COMMERCIAL

## 2023-11-28 VITALS
HEART RATE: 80 BPM | DIASTOLIC BLOOD PRESSURE: 80 MMHG | SYSTOLIC BLOOD PRESSURE: 124 MMHG | HEIGHT: 62 IN | OXYGEN SATURATION: 99 % | RESPIRATION RATE: 18 BRPM | BODY MASS INDEX: 32.21 KG/M2 | WEIGHT: 175.04 LBS | TEMPERATURE: 98.4 F

## 2023-11-28 DIAGNOSIS — S61.012A LACERATION OF LEFT THUMB WITHOUT FOREIGN BODY WITHOUT DAMAGE TO NAIL, INITIAL ENCOUNTER: ICD-10-CM

## 2023-11-28 PROCEDURE — 304999 HCHG REPAIR-SIMPLE/INTERMED LEVEL 1

## 2023-11-28 PROCEDURE — 99282 EMERGENCY DEPT VISIT SF MDM: CPT

## 2023-11-28 PROCEDURE — 700101 HCHG RX REV CODE 250: Performed by: EMERGENCY MEDICINE

## 2023-11-28 PROCEDURE — 304217 HCHG IRRIGATION SYSTEM

## 2023-11-28 PROCEDURE — 303747 HCHG EXTRA SUTURE

## 2023-11-28 RX ORDER — LIDOCAINE HYDROCHLORIDE 20 MG/ML
20 INJECTION, SOLUTION INFILTRATION; PERINEURAL ONCE
Status: COMPLETED | OUTPATIENT
Start: 2023-11-28 | End: 2023-11-28

## 2023-11-28 RX ADMIN — LIDOCAINE HYDROCHLORIDE 20 ML: 20 INJECTION, SOLUTION INFILTRATION; PERINEURAL at 20:00

## 2023-11-28 ASSESSMENT — FIBROSIS 4 INDEX: FIB4 SCORE: 0.31

## 2023-11-29 NOTE — ED PROVIDER NOTES
"ED Provider Note    CHIEF COMPLAINT  Chief Complaint   Patient presents with    T-5000 Lacerations     Cut LT thumb with a  at home approx. 30 min ago       EXTERNAL RECORDS REVIEWED  Outpatient Notes reviewed office visit progress note by Dr. Scott dated February 21, 2023.  Patient is a diabetic, hemoglobin A1c 8.7.  Plan to start Ozempic, continue metformin.    HPI/ROS  LIMITATION TO HISTORY   Select: : None  OUTSIDE HISTORIAN(S):  Significant other notes unknown last tetanus vaccination, release more than 10 years    Stefania Renteria is a 38 y.o. female who presents for evaluation of accidental laceration.  Patient is right-hand dominant.  Was using a , accidentally cut the back of the left thumb today just prior to arrival.  Bleeding controlled with dressing applied in triage.  No numbness or weakness, she is diabetic but is not anticoagulated.  Right-hand-dominant, no other distracting trauma.    PAST MEDICAL HISTORY   has a past medical history of Diabetes (HCC), Obesity, PCOS (polycystic ovarian syndrome), and Pregnant.    SURGICAL HISTORY   has a past surgical history that includes cholecystectomy; cervical cerclage (8/16/2020); and primary c section (N/A, 2/11/2021).    FAMILY HISTORY  Family History   Problem Relation Age of Onset    Hypertension Mother     Diabetes Father     Hyperlipidemia Father     No Known Problems Sister     Diabetes Brother        SOCIAL HISTORY  Social History     Tobacco Use    Smoking status: Never    Smokeless tobacco: Never   Vaping Use    Vaping Use: Never used   Substance and Sexual Activity    Alcohol use: Yes     Comment: occ    Drug use: No    Sexual activity: Yes       CURRENT MEDICATIONS  Home Medications    **Home medications have not yet been reviewed for this encounter**         ALLERGIES  No Known Allergies    PHYSICAL EXAM  VITAL SIGNS: /80   Pulse 80   Temp 36.9 °C (98.4 °F) (Temporal)   Resp 18   Ht 1.575 m (5' 2\")   Wt 79.4 kg (175 " lb 0.7 oz)   LMP 11/14/2023 (Approximate)   SpO2 99%   Breastfeeding No   BMI 32.02 kg/m²    General: Alert, no acute distress  Skin: Warm, dry, normal for ethnicity  Head: Normocephalic, atraumatic  Neck: Trachea midline  Eye: PERRL,  ENMT: Oral mucosa moist  Cardiovascular: Regular rate and rhythm,  Normal peripheral perfusion  Respiratory: respirations are non-labored  Musculoskeletal: No swelling, no deformity: Linear 5 cm laceration to the dorsum of the left thumb extending only to subcutaneous tissues.  Flexion and extension of the distal phalanx of the thumb was grossly intact, sensation to pain and light touch grossly intact.  Brisk capillary refill.  Neurological: Alert and oriented to person, place, time, and situation, neurovasc intact as noted above  Psychiatric: Cooperative, appropriate mood & affect       COURSE & MEDICAL DECISION MAKING    ED Observation Status? No; Patient does not meet criteria for ED Observation.  Differential diagnosis at this point includes but not restricted to laceration.  Have ordered tetanus vaccination and lidocaine 2% for suture repair    Laceration Repair Procedure Note    Indication: Laceration    Procedure: The patient was placed in the appropriate position and anesthesia around the laceration was obtained by infiltration using 2% Lidocaine without epinephrine. The area was then cleansed using chlorhexidine, irrigated with normal saline, explored with no foreign bodies discovered and no tendon injury noted, and draped in a sterile fashion. The laceration was closed with 5-0 Ethilon using interrupted sutures. There were no additional lacerations requiring repair. The wound area was then dressed with gauze.      Total repaired wound length: 5 cm.     Other Items: Suture count: 6    The patient tolerated the procedure well.    Complications: None       INITIAL ASSESSMENT, COURSE AND PLAN  Care Narrative: Neurovascular intact well in appearance 38-year-old diabetic  presents for evaluation of accidental laceration to the left thumb.  Thankfully wound extends only to subcutaneous tissues and she has normal flexion and extension and sensation on exam, this is not consistent with tendon or significant nerve injury.  Does well with suture repair and application of gauze with full hemostasis achieved.  Tetanus updated.        ADDITIONAL PROBLEM LIST  Thumb laceration  DISPOSITION AND DISCUSSIONS  I have discussed management of the patient with the following physicians and ROGER's:  NA    Discussion of management with other QHP or appropriate source(s): None     Escalation of care considered, and ultimately not performed:NA    Barriers to care at this time, including but not limited to:  NA .     Decision tools and prescription drugs considered including, but not limited to:  NA .    The patient will return for new or worsening symptoms and is stable at the time of discharge.    DISPOSITION:  Patient will be discharged home in stable condition.    FOLLOW UP:  Saima Deshpande M.D.  [address]  [address]  [place] [address]  [phone]    Schedule an appointment as soon as possible for a visit         OUTPATIENT MEDICATIONS:  Discharge Medication List as of 11/28/2023  7:59 PM             FINAL DIAGNOSIS  1. Laceration of left thumb without foreign body without damage to nail, initial encounter           Electronically signed by: Mikayla Oseguera M.D., 11/28/2023 7:24 PM

## 2023-11-30 ENCOUNTER — APPOINTMENT (OUTPATIENT)
Dept: URGENT CARE | Facility: CLINIC | Age: 38
End: 2023-11-30
Payer: COMMERCIAL

## 2023-11-30 ENCOUNTER — OFFICE VISIT (OUTPATIENT)
Dept: ENDOCRINOLOGY | Facility: MEDICAL CENTER | Age: 38
End: 2023-11-30
Attending: INTERNAL MEDICINE
Payer: COMMERCIAL

## 2023-11-30 VITALS
WEIGHT: 170 LBS | BODY MASS INDEX: 31.28 KG/M2 | HEIGHT: 62 IN | DIASTOLIC BLOOD PRESSURE: 72 MMHG | HEART RATE: 116 BPM | OXYGEN SATURATION: 98 % | SYSTOLIC BLOOD PRESSURE: 118 MMHG

## 2023-11-30 DIAGNOSIS — E78.2 MIXED HYPERLIPIDEMIA: ICD-10-CM

## 2023-11-30 DIAGNOSIS — E55.9 VITAMIN D DEFICIENCY: ICD-10-CM

## 2023-11-30 DIAGNOSIS — Z79.84 LONG TERM (CURRENT) USE OF ORAL HYPOGLYCEMIC DRUGS: ICD-10-CM

## 2023-11-30 DIAGNOSIS — E11.65 TYPE 2 DIABETES MELLITUS WITH HYPERGLYCEMIA, WITHOUT LONG-TERM CURRENT USE OF INSULIN (HCC): ICD-10-CM

## 2023-11-30 LAB
HBA1C MFR BLD: 6.8 % (ref ?–5.8)
POCT INT CON NEG: NEGATIVE
POCT INT CON POS: POSITIVE

## 2023-11-30 PROCEDURE — 3078F DIAST BP <80 MM HG: CPT | Performed by: INTERNAL MEDICINE

## 2023-11-30 PROCEDURE — 99214 OFFICE O/P EST MOD 30 MIN: CPT | Performed by: INTERNAL MEDICINE

## 2023-11-30 PROCEDURE — 99213 OFFICE O/P EST LOW 20 MIN: CPT | Performed by: INTERNAL MEDICINE

## 2023-11-30 PROCEDURE — 3074F SYST BP LT 130 MM HG: CPT | Performed by: INTERNAL MEDICINE

## 2023-11-30 PROCEDURE — 83036 HEMOGLOBIN GLYCOSYLATED A1C: CPT | Performed by: INTERNAL MEDICINE

## 2023-11-30 PROCEDURE — 95251 CONT GLUC MNTR ANALYSIS I&R: CPT | Performed by: INTERNAL MEDICINE

## 2023-11-30 RX ORDER — ACYCLOVIR 400 MG/1
1 TABLET ORAL CONTINUOUS
Qty: 1 EACH | Refills: 0 | Status: SHIPPED | OUTPATIENT
Start: 2023-11-30

## 2023-11-30 RX ORDER — ACYCLOVIR 400 MG/1
1 TABLET ORAL
Qty: 9 EACH | Refills: 3 | Status: SHIPPED | OUTPATIENT
Start: 2023-11-30

## 2023-11-30 ASSESSMENT — FIBROSIS 4 INDEX: FIB4 SCORE: 0.31

## 2023-11-30 NOTE — PROGRESS NOTES
CHIEF COMPLAINT: Patient is here for follow up of Type 2 Diabetes Mellitus    HPI:     Stefania Renteria is a 38 y.o. female with Type 2 Diabetes Mellitus here for follow up.      Labs from 11/30/2023 show A1c is 6.8%  Labs from 8/18/2023 show A1c was 6.9%   Labs from 5/23/2023 show A1c was 6.1%  Labs from 2/21/2023 show A1c was 8.7%      She has been wearing a Dexcom G6 CGM for over 6 months        She is on   Farxiga 10 mg daily  Ozempic .5 mg weekly  Metformin 1000 mg BID  Lantus 15u daily  Humalog SSI 3 times a day     She is preparing for embryo transfer on Jan 2024      Download of her CGM shows an average BG of 150 with time in range of 80      She has mixed hyperlipidemia   She is currently not on a statin due to impending embryo transfer   Latest Reference Range & Units 05/20/23 09:13   Cholesterol,Tot 100 - 199 mg/dL 176   Triglycerides 0 - 149 mg/dL 122   HDL >=40 mg/dL 41   LDL <100 mg/dL 111 (H)         She does have diabetic kidney disease  She is not an ACEI due to impending embryo transfer  UACR was 78 on 5/2023  U ACR was 256 on June 2022    Point-of-care eye exam was 2/23/2023      BG Diary:  CGM was downloaded  and was reviewed       Weight has been stable    Diabetes Complications   Retinopathy: No known retinopathy.  Last eye exam: 2/2023  Neuropathy: Denies paresthesias or numbness in hands or feet. Denies any foot wounds.  Exercise: Minimal.  Diet: Fair.  Patient's medications, allergies, and social histories were reviewed and updated as appropriate.    ROS:     CONS:     No fever, no chills   EYES:     No diplopia, no blurry vision   CV:           No chest pain, no palpitations   PULM:     No SOB, no cough, no hemoptysis.   GI:            No nausea, no vomiting, no diarrhea, no constipation   ENDO:     No polyuria, no polydipsia, no heat intolerance, no cold intolerance       Past Medical History:  Problem List:  2023-02: Long term (current) use of oral hypoglycemic drugs  2022-11:  "Hypertriglyceridemia  2022-10: Microalbuminuria  2022-10: Vitamin D insufficiency  2021: Thyroid nodule  2021: Pain associated with wound  2020: Long-term insulin use ([facility])  2018: Type 2 diabetes mellitus with hyperglycemia, without long-  term current use of insulin ([facility])  2018: Obesity (BMI 30.0-34.9)  2018: Health care maintenance  2018: Dyslipidemia      Past Surgical History:  Past Surgical History:   Procedure Laterality Date    PRIMARY C SECTION N/A 2021    Procedure:  SECTION, PRIMARY;  Surgeon: Ngoc Coon M.D.;  Location: [facility] LAB[facility] DELIVERY;  Service: Labor and Delivery    CERVICAL CERCLAGE  2020    Procedure: CERCLAGE, CERVIX;  Surgeon: Muna Carballo M.D.;  Location: LABOR AND DELIVERY;  Service: Obstetrics    CHOLECYSTECTOMY          Allergies:  Patient has no known allergies.     Social History:  Social History     Tobacco Use    Smoking status: Never    Smokeless tobacco: Never   Vaping Use    Vaping Use: Never used   Substance Use Topics    Alcohol use: Yes     Comment: occ    Drug use: No        Family History:   family history includes Diabetes in her brother and father; Hyperlipidemia in her father; Hypertension in her mother; No Known Problems in her sister.      PHYSICAL EXAM:   OBJECTIVE:  Vital signs: /72   Pulse (!) 116   Ht 1.575 m (5' 2\")   Wt 77.1 kg (170 lb)   LMP 2023 (Approximate)   SpO2 98%   BMI 31.09 kg/m²   GENERAL: Well-developed, well-nourished in no apparent distress.   EYE:  No ocular asymmetry, PERRLA  HENT: Pink, moist mucous membranes.    NECK: No thyromegaly.   CARDIOVASCULAR:  No murmurs  LUNGS: Clear breath sounds  ABDOMEN: Soft, nontender   EXTREMITIES: No clubbing, cyanosis, or edema.   NEUROLOGICAL: No gross focal motor abnormalities   LYMPH: No cervical adenopathy palpated.   SKIN: No rashes, lesions.     Labs:  Lab Results   Component Value Date/Time    HBA1C 6.8 (A) 2023 08:41 AM        Lab " Results   Component Value Date/Time    WBC 8.4 12/29/2022 06:45 AM    RBC 4.74 12/29/2022 06:45 AM    HEMOGLOBIN 13.5 12/29/2022 06:45 AM    MCV 86.9 12/29/2022 06:45 AM    MCH 28.5 12/29/2022 06:45 AM    MCHC 32.8 (L) 12/29/2022 06:45 AM    RDW 41.5 12/29/2022 06:45 AM    MPV 10.2 12/29/2022 06:45 AM       Lab Results   Component Value Date/Time    SODIUM 137 05/20/2023 09:13 AM    POTASSIUM 3.8 05/20/2023 09:13 AM    CHLORIDE 105 05/20/2023 09:13 AM    CO2 22 05/20/2023 09:13 AM    ANION 10.0 05/20/2023 09:13 AM    GLUCOSE 108 (H) 05/20/2023 09:13 AM    BUN 10 05/20/2023 09:13 AM    CREATININE 0.47 (L) 05/20/2023 09:13 AM    CALCIUM 8.7 05/20/2023 09:13 AM    ASTSGOT 12 05/20/2023 09:13 AM    ALTSGPT 18 05/20/2023 09:13 AM    TBILIRUBIN 0.4 05/20/2023 09:13 AM    ALBUMIN 4.0 05/20/2023 09:13 AM    TOTPROTEIN 7.5 05/20/2023 09:13 AM    GLOBULIN 3.5 05/20/2023 09:13 AM    AGRATIO 1.1 05/20/2023 09:13 AM       Lab Results   Component Value Date/Time    CHOLSTRLTOT 244 (H) 06/20/2022 0952    TRIGLYCERIDE 816 (H) 06/20/2022 0952    HDL 32 (A) 06/20/2022 0952    LDL see below 06/20/2022 0952       Lab Results   Component Value Date/Time    MALBCRT 78 (H) 05/20/2023 09:10 AM    MICROALBUR 9.4 05/20/2023 09:10 AM    MICRALB 106.9 10/12/2019 05:39 AM        Lab Results   Component Value Date/Time    TSHULTRASEN 1.500 09/14/2022 0707     No results found for: FREEDIR  No results found for: FREET3  No results found for: THYSTIMIG        ASSESSMENT/PLAN:     1. Type 2 diabetes mellitus with hyperglycemia, without long-term current use of insulin (HCC)  Improved control  CGM was downloaded and reviewed  Will order Dexcom G7 to replace G6  Farxiga 10 mg daily  Ozempic .5 mg weekly  Glargine 15 units daily  Metformin 1000 mg BID  She will notify us once she has the embryo transfer so we can start her on insulin  She needs to stop ozempic and farxiga after successful embryo transfer    Follow-up in 3 months with repeat of A1c in  the office    2. Mixed hyperlipidemia  Stable  Continue monitoring lipids    3. Vitamin D deficiency  Stable       4. Long term (current)Continue monitoring vitamin D levels use of oral hypoglycemic drugs  Patient is on oral agents for type 2 diabetes      Return in about 3 months (around 2/29/2024).      Thank you kindly for allowing me to participate in the diabetes care plan for this patient.    Esvin Oseguera MD, FACE, [facility] [place]      CC:   Saima Deshpande M.D.

## 2023-12-08 ENCOUNTER — OFFICE VISIT (OUTPATIENT)
Dept: URGENT CARE | Facility: CLINIC | Age: 38
End: 2023-12-08
Payer: COMMERCIAL

## 2023-12-08 VITALS
BODY MASS INDEX: 31.65 KG/M2 | RESPIRATION RATE: 16 BRPM | SYSTOLIC BLOOD PRESSURE: 114 MMHG | HEART RATE: 104 BPM | OXYGEN SATURATION: 96 % | WEIGHT: 172 LBS | HEIGHT: 62 IN | TEMPERATURE: 97.8 F | DIASTOLIC BLOOD PRESSURE: 74 MMHG

## 2023-12-08 DIAGNOSIS — Z48.02 VISIT FOR SUTURE REMOVAL: ICD-10-CM

## 2023-12-08 PROCEDURE — 3078F DIAST BP <80 MM HG: CPT | Performed by: FAMILY MEDICINE

## 2023-12-08 PROCEDURE — 99213 OFFICE O/P EST LOW 20 MIN: CPT | Performed by: FAMILY MEDICINE

## 2023-12-08 PROCEDURE — 3074F SYST BP LT 130 MM HG: CPT | Performed by: FAMILY MEDICINE

## 2023-12-08 ASSESSMENT — ENCOUNTER SYMPTOMS: ROS SKIN COMMENTS: LEFT THUMB LACERATION

## 2023-12-08 ASSESSMENT — FIBROSIS 4 INDEX: FIB4 SCORE: 0.31

## 2023-12-09 NOTE — PROGRESS NOTES
"Subjective:   Stefania Renteria is a 38 y.o. female who presents for Suture / Staple Removal (X 10 days since stitches were placed, Stitches removal on L thumb)      Suture / Staple Removal        Review of Systems   Skin:         Left thumb laceration       Medications, Allergies, and current problem list reviewed today in Epic.     Objective:     /74 (BP Location: Left arm, Patient Position: Sitting, BP Cuff Size: Large adult)   Pulse (!) 104   Temp 36.6 °C (97.8 °F)   Resp 16   Ht 1.575 m (5' 2\")   Wt 78 kg (172 lb)   SpO2 96%     Physical Exam  Vitals and nursing note reviewed.   Skin:     Comments: Left thumb laceration, with suture removal and steri strip placement         Assessment/Plan:     Diagnosis and associated orders:     1. Visit for suture removal           Comments/MDM:     Steri strips applied         Differential diagnosis, natural history, supportive care, and indications for immediate follow-up discussed.    Advised the patient to follow-up with the primary care physician for recheck, reevaluation, and consideration of further management.    Please note that this dictation was created using voice recognition software. I have made a reasonable attempt to correct obvious errors, but I expect that there are errors of grammar and possibly content that I did not discover before finalizing the note.    This note was electronically signed by Raulito Avalos M.D.  "

## 2024-01-22 DIAGNOSIS — E11.65 TYPE 2 DIABETES MELLITUS WITH HYPERGLYCEMIA, WITHOUT LONG-TERM CURRENT USE OF INSULIN (HCC): ICD-10-CM

## 2024-01-22 DIAGNOSIS — E11.9 TYPE 2 DIABETES MELLITUS WITHOUT COMPLICATION, WITHOUT LONG-TERM CURRENT USE OF INSULIN (HCC): ICD-10-CM

## 2024-01-22 RX ORDER — INSULIN ASPART 100 [IU]/ML
10 INJECTION, SOLUTION INTRAVENOUS; SUBCUTANEOUS
Qty: 15 ML | Refills: 3 | Status: SHIPPED | OUTPATIENT
Start: 2024-01-22

## 2024-01-22 RX ORDER — INSULIN GLARGINE 100 [IU]/ML
30 INJECTION, SOLUTION SUBCUTANEOUS EVERY EVENING
Qty: 15 ML | Refills: 3 | Status: SHIPPED | OUTPATIENT
Start: 2024-01-22 | End: 2024-02-15

## 2024-01-22 RX ORDER — PEN NEEDLE, DIABETIC 32GX 5/32"
1 NEEDLE, DISPOSABLE MISCELLANEOUS 4 TIMES DAILY
Qty: 200 EACH | Refills: 3 | Status: SHIPPED | OUTPATIENT
Start: 2024-01-22

## 2024-01-23 DIAGNOSIS — E11.65 TYPE 2 DIABETES MELLITUS WITH HYPERGLYCEMIA, WITHOUT LONG-TERM CURRENT USE OF INSULIN (HCC): ICD-10-CM

## 2024-01-23 RX ORDER — INSULIN GLARGINE 300 U/ML
30 INJECTION, SOLUTION SUBCUTANEOUS DAILY
Qty: 3 ML | Refills: 6 | Status: SHIPPED | OUTPATIENT
Start: 2024-01-23

## 2024-01-25 ENCOUNTER — TELEPHONE (OUTPATIENT)
Dept: ENDOCRINOLOGY | Facility: MEDICAL CENTER | Age: 39
End: 2024-01-25
Payer: COMMERCIAL

## 2024-01-25 NOTE — TELEPHONE ENCOUNTER
Received [employer] request via [employer]  for  Insulin Glargine, 1 Unit Dial, (TOUJEO SOLOSTAR) 300 UNIT/ML Solution Pen-injector  . (Quantity:4.5ml, Day Supply:30)     Insurance: [employer]  Member ID:  [id number]  BIN: [id number]  PCN: [id number]  Group: [id number]     Ran Test claim via [employer] for a 30.00 copay. Will outreach to patient to offer specialty pharmacy services and or release to preferred pharmacy

## 2024-02-15 DIAGNOSIS — E11.65 TYPE 2 DIABETES MELLITUS WITH HYPERGLYCEMIA, WITHOUT LONG-TERM CURRENT USE OF INSULIN (HCC): ICD-10-CM

## 2024-02-15 RX ORDER — INSULIN GLARGINE 100 [IU]/ML
30 INJECTION, SOLUTION SUBCUTANEOUS EVERY EVENING
Qty: 15 ML | Refills: 3 | Status: SHIPPED | OUTPATIENT
Start: 2024-02-15 | End: 2024-02-20

## 2024-02-18 DIAGNOSIS — E11.65 TYPE 2 DIABETES MELLITUS WITH HYPERGLYCEMIA, WITHOUT LONG-TERM CURRENT USE OF INSULIN (HCC): ICD-10-CM

## 2024-02-20 RX ORDER — INSULIN GLARGINE 100 [IU]/ML
30 INJECTION, SOLUTION SUBCUTANEOUS EVERY EVENING
Qty: 30 ML | Refills: 3 | Status: SHIPPED | OUTPATIENT
Start: 2024-02-20

## 2024-03-07 ENCOUNTER — PATIENT MESSAGE (OUTPATIENT)
Dept: ENDOCRINOLOGY | Facility: MEDICAL CENTER | Age: 39
End: 2024-03-07
Payer: COMMERCIAL

## 2024-03-07 DIAGNOSIS — E11.65 TYPE 2 DIABETES MELLITUS WITH HYPERGLYCEMIA, WITHOUT LONG-TERM CURRENT USE OF INSULIN (HCC): ICD-10-CM

## 2024-03-07 RX ORDER — DAPAGLIFLOZIN 10 MG/1
10 TABLET, FILM COATED ORAL DAILY
Qty: 30 TABLET | Refills: 0 | Status: SHIPPED | OUTPATIENT
Start: 2024-03-07

## 2024-04-04 RX ORDER — DAPAGLIFLOZIN 10 MG/1
10 TABLET, FILM COATED ORAL DAILY
Qty: 30 TABLET | Refills: 0 | Status: SHIPPED | OUTPATIENT
Start: 2024-04-04 | End: 2024-04-08 | Stop reason: CLARIF

## 2024-04-08 DIAGNOSIS — E11.65 TYPE 2 DIABETES MELLITUS WITH HYPERGLYCEMIA, WITHOUT LONG-TERM CURRENT USE OF INSULIN (HCC): ICD-10-CM

## 2024-04-08 RX ORDER — DAPAGLIFLOZIN 10 MG/1
10 TABLET, FILM COATED ORAL DAILY
Qty: 90 TABLET | Refills: 1 | Status: SHIPPED | OUTPATIENT
Start: 2024-04-08

## 2024-07-04 DIAGNOSIS — E11.65 TYPE 2 DIABETES MELLITUS WITH HYPERGLYCEMIA, WITHOUT LONG-TERM CURRENT USE OF INSULIN (HCC): ICD-10-CM

## 2024-07-15 ENCOUNTER — NON-PROVIDER VISIT (OUTPATIENT)
Dept: ENDOCRINOLOGY | Facility: MEDICAL CENTER | Age: 39
End: 2024-07-15
Attending: INTERNAL MEDICINE
Payer: COMMERCIAL

## 2024-07-15 VITALS
DIASTOLIC BLOOD PRESSURE: 70 MMHG | HEART RATE: 88 BPM | HEIGHT: 62 IN | OXYGEN SATURATION: 95 % | SYSTOLIC BLOOD PRESSURE: 112 MMHG | BODY MASS INDEX: 31.83 KG/M2 | WEIGHT: 173 LBS

## 2024-07-15 DIAGNOSIS — E11.65 TYPE 2 DIABETES MELLITUS WITH HYPERGLYCEMIA, WITHOUT LONG-TERM CURRENT USE OF INSULIN (HCC): ICD-10-CM

## 2024-07-15 LAB
HBA1C MFR BLD: 7.5 % (ref ?–5.8)
POCT INT CON NEG: NEGATIVE
POCT INT CON POS: POSITIVE
RETINAL SCREEN: NORMAL

## 2024-07-15 PROCEDURE — 99212 OFFICE O/P EST SF 10 MIN: CPT | Performed by: INTERNAL MEDICINE

## 2024-07-15 PROCEDURE — 83036 HEMOGLOBIN GLYCOSYLATED A1C: CPT

## 2024-07-15 RX ORDER — DAPAGLIFLOZIN 10 MG/1
10 TABLET, FILM COATED ORAL DAILY
Qty: 90 TABLET | Refills: 1 | Status: SHIPPED | OUTPATIENT
Start: 2024-07-15

## 2024-07-15 RX ORDER — SEMAGLUTIDE 0.68 MG/ML
0.5 INJECTION, SOLUTION SUBCUTANEOUS
Qty: 9 ML | Refills: 1 | Status: SHIPPED | OUTPATIENT
Start: 2024-07-15

## 2024-07-15 ASSESSMENT — FIBROSIS 4 INDEX: FIB4 SCORE: 0.31

## 2024-09-13 DIAGNOSIS — E11.65 TYPE 2 DIABETES MELLITUS WITH HYPERGLYCEMIA, WITHOUT LONG-TERM CURRENT USE OF INSULIN (HCC): ICD-10-CM

## 2024-09-15 RX ORDER — DAPAGLIFLOZIN 10 MG/1
10 TABLET, FILM COATED ORAL DAILY
Qty: 90 TABLET | Refills: 1 | Status: SHIPPED | OUTPATIENT
Start: 2024-09-15

## (undated) DEVICE — CATHETER IV NON-SAFETY 18 GA X 1 1/4 (50/BX 4BX/CA)

## (undated) DEVICE — DETERGENT RENUZYME PLUS 10 OZ PACKET (50/BX)

## (undated) DEVICE — RETRACTOR O C SECTION LRY - (5/BX)

## (undated) DEVICE — STAPLER SKIN DISP - (6/BX 10BX/CA) VISISTAT

## (undated) DEVICE — SODIUM CHL IRRIGATION 0.9% 1000ML (12EA/CA)

## (undated) DEVICE — TRAY SRGPRP PVP IOD WT PRP - (20/CA)

## (undated) DEVICE — WATER IRRIGATION STERILE 1000ML (12EA/CA)

## (undated) DEVICE — TRAY SPINAL ANESTHESIA NON-SAFETY (10/CA)

## (undated) DEVICE — SUTURE 0 CHROMIC CT-1 - (36/BX)

## (undated) DEVICE — SUTURE3-0 36IN VCRLY PLS ANTI (36PK/BX)

## (undated) DEVICE — SUTURE 0 36 CHROMIC GUT CTX (36PK/BX)

## (undated) DEVICE — GLOVE BIOGEL INDICATOR SZ 7SURGICAL PF LTX - (50/BX 4BX/CA)

## (undated) DEVICE — PACK VAGINAL FOR L&D (4EA/CA)

## (undated) DEVICE — SUTURE 3-0 VICRYL PLUS CT-1 - 36 INCH (36/BX)

## (undated) DEVICE — PACK ROOM TURNOVER L&D (12/CA)

## (undated) DEVICE — HEAD HOLDER JUNIOR/ADULT

## (undated) DEVICE — SUTURE 1 VICRYL PLUS CTX - 36 INCH (36/BX)

## (undated) DEVICE — SLEEVE, SEQUENTIAL CALF REG

## (undated) DEVICE — TUBE SUCTION YANKAUER  1/4 X 6FT (20EA/CA)"

## (undated) DEVICE — KIT  I.V. START (100EA/CA)

## (undated) DEVICE — TAPE CLOTH MEDIPORE 6 INCH - (12RL/CA)

## (undated) DEVICE — GLOVE BIOGEL SZ 6.5 SURGICAL PF LTX (50PR/BX 4BX/CA)

## (undated) DEVICE — SUTURE 0 GUT-PLAIN (36PK/BX)

## (undated) DEVICE — SUTURE 2-0 VICRYL PLUS CT-1 36 (36PK/BX)"

## (undated) DEVICE — SUTURE 0 VICRYL PLUS CT-1 - 36 INCH (36/BX)

## (undated) DEVICE — SET EXTENSION WITH 2 PORTS (48EA/CA) ***PART #2C8610 IS A SUBSTITUTE*****

## (undated) DEVICE — CANISTER SUCTION 3000ML MECHANICAL FILTER AUTO SHUTOFF MEDI-VAC NONSTERILE LF DISP  (40EA/CA)

## (undated) DEVICE — CHLORAPREP 26 ML APPLICATOR - ORANGE TINT(25/CA)

## (undated) DEVICE — PACK C-SECTION (2EA/CA)

## (undated) DEVICE — SUTURE 1 VICRYL PLUSCT-1 27IN - (36/BX)

## (undated) DEVICE — ELECTRODE DUAL RETURN W/ CORD - (50/PK)

## (undated) DEVICE — TUBING CLEARLINK DUO-VENT - C-FLO (48EA/CA)

## (undated) DEVICE — GLOVE BIOGEL SZ 8 SURGICAL PF LTX - (50PR/BX 4BX/CA)

## (undated) DEVICE — SUTURE 5 TI-CRON HOS-14 - (36/BX)

## (undated) DEVICE — GLOVE BIOGEL SZ 7.5 SURGICAL PF LTX - (50PR/BX 4BX/CA)